# Patient Record
Sex: MALE | Race: WHITE | NOT HISPANIC OR LATINO | Employment: OTHER | URBAN - METROPOLITAN AREA
[De-identification: names, ages, dates, MRNs, and addresses within clinical notes are randomized per-mention and may not be internally consistent; named-entity substitution may affect disease eponyms.]

---

## 2017-02-27 ENCOUNTER — GENERIC CONVERSION - ENCOUNTER (OUTPATIENT)
Dept: OTHER | Facility: OTHER | Age: 67
End: 2017-02-27

## 2017-03-15 ENCOUNTER — ALLSCRIPTS OFFICE VISIT (OUTPATIENT)
Dept: OTHER | Facility: OTHER | Age: 67
End: 2017-03-15

## 2017-03-17 ENCOUNTER — ALLSCRIPTS OFFICE VISIT (OUTPATIENT)
Dept: OTHER | Facility: OTHER | Age: 67
End: 2017-03-17

## 2017-03-22 ENCOUNTER — GENERIC CONVERSION - ENCOUNTER (OUTPATIENT)
Dept: OTHER | Facility: OTHER | Age: 67
End: 2017-03-22

## 2017-04-10 ENCOUNTER — GENERIC CONVERSION - ENCOUNTER (OUTPATIENT)
Dept: OTHER | Facility: OTHER | Age: 67
End: 2017-04-10

## 2017-04-26 ENCOUNTER — GENERIC CONVERSION - ENCOUNTER (OUTPATIENT)
Dept: OTHER | Facility: OTHER | Age: 67
End: 2017-04-26

## 2017-05-12 ENCOUNTER — GENERIC CONVERSION - ENCOUNTER (OUTPATIENT)
Dept: OTHER | Facility: OTHER | Age: 67
End: 2017-05-12

## 2017-07-25 ENCOUNTER — GENERIC CONVERSION - ENCOUNTER (OUTPATIENT)
Dept: OTHER | Facility: OTHER | Age: 67
End: 2017-07-25

## 2017-09-04 ENCOUNTER — GENERIC CONVERSION - ENCOUNTER (OUTPATIENT)
Dept: OTHER | Facility: OTHER | Age: 67
End: 2017-09-04

## 2017-09-21 ENCOUNTER — ALLSCRIPTS OFFICE VISIT (OUTPATIENT)
Dept: OTHER | Facility: OTHER | Age: 67
End: 2017-09-21

## 2017-09-21 DIAGNOSIS — I10 ESSENTIAL (PRIMARY) HYPERTENSION: ICD-10-CM

## 2017-10-31 ENCOUNTER — ALLSCRIPTS OFFICE VISIT (OUTPATIENT)
Dept: OTHER | Facility: OTHER | Age: 67
End: 2017-10-31

## 2017-10-31 DIAGNOSIS — M25.551 PAIN IN RIGHT HIP: ICD-10-CM

## 2017-10-31 DIAGNOSIS — M25.552 PAIN IN LEFT HIP: ICD-10-CM

## 2017-10-31 DIAGNOSIS — Z11.59 ENCOUNTER FOR SCREENING FOR OTHER VIRAL DISEASES (CODE): ICD-10-CM

## 2017-11-07 ENCOUNTER — APPOINTMENT (OUTPATIENT)
Dept: LAB | Facility: HOSPITAL | Age: 67
End: 2017-11-07
Attending: FAMILY MEDICINE
Payer: COMMERCIAL

## 2017-11-07 ENCOUNTER — HOSPITAL ENCOUNTER (OUTPATIENT)
Dept: RADIOLOGY | Facility: HOSPITAL | Age: 67
Discharge: HOME | End: 2017-11-07
Attending: FAMILY MEDICINE
Payer: COMMERCIAL

## 2017-11-07 ENCOUNTER — GENERIC CONVERSION - ENCOUNTER (OUTPATIENT)
Dept: OTHER | Facility: OTHER | Age: 67
End: 2017-11-07

## 2017-11-07 ENCOUNTER — TRANSCRIBE ORDERS (OUTPATIENT)
Dept: ADMINISTRATIVE | Facility: HOSPITAL | Age: 67
End: 2017-11-07

## 2017-11-07 DIAGNOSIS — M25.551 PAIN IN RIGHT HIP: ICD-10-CM

## 2017-11-07 DIAGNOSIS — M25.552 PAIN IN LEFT HIP: ICD-10-CM

## 2017-11-07 DIAGNOSIS — Z11.59 ENCOUNTER FOR SCREENING FOR OTHER VIRAL DISEASES (CODE): ICD-10-CM

## 2017-11-07 LAB — HCV AB SER QL: NORMAL

## 2017-11-07 PROCEDURE — 36415 COLL VENOUS BLD VENIPUNCTURE: CPT

## 2017-11-07 PROCEDURE — 86803 HEPATITIS C AB TEST: CPT

## 2018-01-10 NOTE — PROGRESS NOTES
Assessment    1  Encounter for preventive health examination (V70 0) (Z00 00)   2  Chronic bilateral low back pain without sciatica (724 2,338 29) (M54 5,G89 29)   3  Hip pain, bilateral (719 45) (M25 551,M25 552)    Plan  Chronic bilateral low back pain without sciatica    · Meloxicam 15 MG Oral Tablet; TAKE 1 TABLET DAILY AS NEEDED   · Follow-up visit in 6 months Evaluation and Treatment  Follow-up  Status: Hold For -  Scheduling  Requested for: 31Oct2017  Hip pain, bilateral    · * XR HIP/PELV 2-3 VWS LEFT W PELVIS IF PERFORMED; Status:Active; Requested  for:31Oct2017;    · * XR HIP/PELV 2-3 VWS RIGHT W PELVIS IF PERFORMED; Status:Active; Requested  for:31Oct2017;   Hyperlipidemia    · Simvastatin 20 MG Oral Tablet; Take 1 tablet by mouth at bedtime  Need for hepatitis C screening test    · (1) HEP C ANTIBODY; Status:Active; Requested for:31Oct2017;     Discussion/Summary  health maintenance visit Currently, he eats a healthy diet  Prostate cancer screening: prostate cancer screening is current  Colorectal cancer screening: colorectal cancer screening is current  The risks and benefits of immunizations were discussed  Advice and education were given regarding aerobic exercise and cardiovascular risk reduction  Patient discussion: discussed with the patient  The patient was counseled on Hepatitis C screening  The patient agrees to Hepatitis C screening  Chief Complaint  Patient is here for a PE , discuss arthritis, urologist visits and general surgeon follow up      History of Present Illness  HM, Adult Male: The patient is being seen for a health maintenance evaluation  General Health: The patient's health since the last visit is described as good  He has regular dental visits  He denies vision problems  He denies hearing loss  Immunizations status: up to date  Lifestyle:  He consumes a diverse and healthy diet  He does not have any weight concerns  He exercises regularly   He does not use tobacco  He denies alcohol use  He denies drug use  Screening: cancer screening reviewed and current  metabolic screening reviewed and current  risk screening reviewed and current  Review of Systems    Constitutional: No fever or chills, feels well, no tiredness, no recent weight gain or weight loss  Eyes: No complaints of eye pain, no red eyes, no discharge from eyes, no itchy eyes  ENT: no complaints of earache, no hearing loss, no nosebleeds, no nasal discharge, no sore throat, no hoarseness  Cardiovascular: No complaints of slow heart rate, no fast heart rate, no chest pain, no palpitations, no leg claudication, no lower extremity  Respiratory: No complaints of shortness of breath, no wheezing, no cough, no SOB on exertion, no orthopnea or PND  Gastrointestinal: No complaints of abdominal pain, no constipation, no nausea or vomiting, no diarrhea or bloody stools  Genitourinary: No complaints of dysuria, no incontinence, no hesitancy, no nocturia, no genital lesion, no testicular pain  Musculoskeletal: hip pain  Integumentary: No complaints of skin rash or skin lesions, no itching, no skin wound, no dry skin  Neurological: No compliants of headache, no confusion, no convulsions, no numbness or tingling, no dizziness or fainting, no limb weakness, no difficulty walking  Psychiatric: Is not suicidal, no sleep disturbances, no anxiety or depression, no change in personality, no emotional problems  Endocrine: No complaints of proptosis, no hot flashes, no muscle weakness, no erectile dysfunction, no deepening of the voice, no feelings of weakness  Hematologic/Lymphatic: No complaints of swollen glands, no swollen glands in the neck, does not bleed easily, no easy bruising  Active Problems    1  Cardiomyopathy (425 4) (I42 9)   2  Colon cancer screening (V76 51) (Z12 11)   3  History of colon polyps (V12 72) (Z86 010)   4  Hyperlipidemia (272 4) (E78 5)   5  Hypertension (401 9) (I10)   6  Lipoma of torso (214 1) (D17 1)   7  Mitral valve disease (394 9) (I05 9)   8  Neck pain (723 1) (M54 2)    Past Medical History    · History of Benign hypertension (401 1) (I10)   · H/O echocardiogram (V15 89) (Z92 89)   · History of atrial fibrillation (V12 59) (Z86 79)   · History of blood coagulation disorder (V12 3) (Z86 2)   · History of Hypertriglyceridemia (272 1) (E78 1)   · 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)   · History of Neurogenic bladder (596 54) (N31 9)   · History of Other postprocedural cardiac functional disturbance following cardiac surgery  (429 4) (I97 190)    Family History  Mother    · Family history of Diabetes (250 00) (E11 9)   · Family history of colon cancer (V16 0) (Z80 0)   · Denied: Family history of mental disorder   · Denied: Family history of substance abuse  Father    · Denied: Family history of mental disorder   · Denied: Family history of substance abuse    Social History    · Alcohol use (V49 89) (Z78 9)   · Always uses seat belt   · Daily caffeine consumption, 2-3 servings a day   · Does not use illicit drugs (D88 52) (Z78 9)   · Employed   · Former smoker (V15 82) (R73 881)   · Seeing a dentist   · Denied: History of Smoking    Current Meds   1  Aspirin 81 MG Oral Tablet Delayed Release; take 1 tablet every day; Therapy: 98XPD7819 to (Last Rx:15Mar2017)  Requested for: 19STK4195 Ordered   2  Metoprolol Succinate ER 50 MG Oral Tablet Extended Release 24 Hour; TAKE 1/2   TABLETS DAILY; Therapy: 21SLJ4594 to (Evaluate:10Mar2018)  Requested for: 76RKQ2951; Last   Rx:15Mar2017 Ordered   3  Ramipril 2 5 MG Oral Capsule; take 1 capsule daily; Therapy: 39OTW5961 to (NWFEZDOY:76ZZL4092)  Requested for: 68XAH8644; Last   Rx:65Qpq4818 Ordered   4  Tamsulosin HCl - 0 4 MG Oral Capsule; Therapy: 83HRU1728 to (Evaluate:46Igg6819) Recorded    Allergies    1   No Known Drug Allergies    Vitals   Recorded: 68AGW9022 04:48PM   Heart Rate 66   Systolic 644   Diastolic 60 Height 6 ft 3 in   Weight 190 lb    BMI Calculated 23 75   BSA Calculated 2 15   O2 Saturation 98     Physical Exam    Constitutional   General appearance: No acute distress, well appearing and well nourished  Eyes   Conjunctiva and lids: No erythema, swelling or discharge  Pupils and irises: Equal, round, reactive to light  Ophthalmoscopic examination: Normal fundi and optic discs  Ears, Nose, Mouth, and Throat   External inspection of ears and nose: Normal     Otoscopic examination: Tympanic membranes translucent with normal light reflex  Canals patent without erythema  Hearing: Normal     Nasal mucosa, septum, and turbinates: Normal without edema or erythema  Lips, teeth, and gums: Normal, good dentition  Oropharynx: Normal with no erythema, edema, exudate or lesions  Neck   Neck: Supple, symmetric, trachea midline, no masses  Thyroid: Normal, no thyromegaly  Pulmonary   Respiratory effort: No increased work of breathing or signs of respiratory distress  Percussion of chest: Normal     Palpation of chest: Normal     Auscultation of lungs: Clear to auscultation  Cardiovascular   Palpation of heart: Normal PMI, no thrills  Auscultation of heart: Normal rate and rhythm, normal S1 and S2, no murmurs  Carotid pulses: 2+ bilaterally  Abdominal aorta: Normal     Femoral pulses: 2+ bilaterally  Pedal pulses: 2+ bilaterally  Examination of extremities for edema and/or varicosities: Normal     Chest   Breasts: Normal, no dimpling or skin changes appreciated  Palpation of breasts and axillae: Normal, no masses palpated  Chest: Normal     Abdomen   Abdomen: Non-tender, no masses  Liver and spleen: No hepatomegaly or splenomegaly  Lymphatic   Palpation of lymph nodes in neck: No lymphadenopathy  Palpation of lymph nodes in axillae: No lymphadenopathy  Palpation of lymph nodes in groin: No lymphadenopathy      Palpation of lymph nodes in other areas: No lymphadenopathy  Musculoskeletal   Gait and station: Normal     Inspection/palpation of digits and nails: Normal without clubbing or cyanosis  Inspection/palpation of joints, bones, and muscles: Normal     Range of motion: Normal     Stability: Normal     Muscle strength/tone: Normal     Skin   Skin and subcutaneous tissue: Abnormal   multiple lipomas on arms  Palpation of skin and subcutaneous tissue: Normal turgor  Neurologic   Cranial nerves: Cranial nerves 2-12 intact  Reflexes: 2+ and symmetric  Sensation: No sensory loss  Psychiatric   Judgment and insight: Normal     Orientation to person, place and time: Normal     Recent and remote memory: Intact  Mood and affect: Normal        Health Management  Colon cancer screening   COLONOSCOPY; every 5 years; Last 94Pbs7417; Next Due: 64Ace6945;  Active    Signatures   Electronically signed by : Kenney Boxer, DO; Oct 31 2017  5:14PM EST                       (Author)

## 2018-01-13 VITALS
HEIGHT: 75 IN | BODY MASS INDEX: 23.62 KG/M2 | OXYGEN SATURATION: 96 % | SYSTOLIC BLOOD PRESSURE: 108 MMHG | DIASTOLIC BLOOD PRESSURE: 78 MMHG | HEART RATE: 66 BPM | WEIGHT: 190 LBS

## 2018-01-14 VITALS
BODY MASS INDEX: 23.42 KG/M2 | DIASTOLIC BLOOD PRESSURE: 62 MMHG | SYSTOLIC BLOOD PRESSURE: 108 MMHG | WEIGHT: 188.38 LBS | HEIGHT: 75 IN

## 2018-01-14 VITALS
HEIGHT: 75 IN | WEIGHT: 190 LBS | BODY MASS INDEX: 23.62 KG/M2 | HEART RATE: 66 BPM | OXYGEN SATURATION: 98 % | SYSTOLIC BLOOD PRESSURE: 114 MMHG | DIASTOLIC BLOOD PRESSURE: 60 MMHG

## 2018-01-14 VITALS
DIASTOLIC BLOOD PRESSURE: 68 MMHG | WEIGHT: 190 LBS | HEIGHT: 75 IN | SYSTOLIC BLOOD PRESSURE: 112 MMHG | OXYGEN SATURATION: 97 % | HEART RATE: 75 BPM | BODY MASS INDEX: 23.62 KG/M2

## 2018-01-14 NOTE — RESULT NOTES
Verified Results  * XR HIPS BILATERAL 2 VW W PELVIS IF PERFORMED [id number] 08:03AM Siri Kraus Order Number: [id number]     Test Name Result Flag Reference   XR HIPS BILATERAL WITH AP PELVIS (Report)     BILATERAL HIPS AND PELVIS     INDICATION: M25 551: Pain in right hip   M25 552: Pain in left hip  History taken directly from the electronic ordering system  COMPARISON: None     VIEWS: AP pelvis and coned down views of each hip     IMAGES: 5      FINDINGS:     No acute pelvic fracture or pathologic bone lesions  Visualized bony pelvis appears intact  There are moderate degenerative changes of the lower lumbar spine  LEFT HIP:   There are mild arthritic changes including joint space narrowing and small acetabular marginal osteophytes  There are tiny osteophytes at the femoral head  Bony alignment is maintained  Soft tissues are unremarkable  RIGHT HIP:   There are mild arthritic changes, including joint space narrowing as well as small acetabular marginal osteophytes  There is a small osteophyte at the superior margin of the femoral head  Bony alignment is maintained  Soft tissues are unremarkable  IMPRESSION:     Mild osteoarthritic degenerative changes of both hip joints         Workstation performed: [id number]     Signed by:   Quicny Rodriguez MD   11/7/17

## 2018-01-15 NOTE — MISCELLANEOUS
This is to state that this patient has no specific contraindication from cardiac standpoint to have back injection  Patient can stop aspirin 5 days prior to the procedure  If you have any specific questions, please  do not hesitate to contact me  Electronically signed by:Larissa GARRIDO    Sep 30 2016 11:29AM EST

## 2018-01-15 NOTE — MISCELLANEOUS
This is to state that this patient has no specific contraindication from cardiac standpoint to have lipoma removal  If you have any specific questions, please do not hesitate to contact me in person  Patient can  stop aspirin for 5 days prior to surgery and restart when okay from surgical standpoint  Electronically signed by:Larissa GARRIDO    Apr 26 2017 10:50PM EST

## 2018-01-15 NOTE — PROGRESS NOTES
Assessment    1  Encounter for preventive health examination (V70 0) (Z00 00)   2  Cardiomyopathy (425 4) (I42 9)   3  Hyperlipidemia (272 4) (E78 5)   4  Hypertension (401 9) (I10)    Plan  Colon cancer screening    · COLONOSCOPY; Status:Active; Requested KOP:[id number]; Health Maintenance    · Follow-up visit in 1 year Evaluation and Treatment  Follow-up  Status: Hold For -  Scheduling  Requested for: 27Oct2016  Hyperlipidemia, Hypertension    · (1) CBC/ PLT (NO DIFF); Status:Active; Requested ROGER:[id number];    · (1) COMPREHENSIVE METABOLIC PANEL; Status:Active; Requested SUP:[id number];     Discussion/Summary  Impression: health maintenance visit  Currently, he eats a healthy diet  Prostate cancer screening: prostate cancer screening is current  Testicular cancer screening: the risks and benefits of testicular cancer screening were discussed  Colorectal cancer screening: colonoscopy has been ordered  Advice and education were given regarding nutrition and aerobic exercise  Follow-up with spine surgeon as scheduled  call if neurofibromas start to bother him and will refer to surgeon for removal      Self Referrals: Yes [place] back specialist      Chief Complaint  Wellness visit  Patient states he is finishing a steroid pack prescribed for his back  History of Present Illness  HM, Adult Male: The patient is being seen for a health maintenance evaluation  General Health: The patient's health since the last visit is described as good  He has regular dental visits  He denies vision problems  He denies hearing loss  Immunizations status: up to date  Lifestyle:  He consumes a diverse and healthy diet  He does not have any weight concerns  He exercises regularly  He does not use tobacco  He denies alcohol use  He denies drug use  Screening: cancer screening reviewed and current  metabolic screening reviewed and current  risk screening reviewed and current        Review of Systems    Constitutional: No fever or chills, feels well, no tiredness, no recent weight gain or weight loss  Eyes: No complaints of eye pain, no red eyes, no discharge from eyes, no itchy eyes  ENT: no complaints of earache, no hearing loss, no nosebleeds, no nasal discharge, no sore throat, no hoarseness  Cardiovascular: No complaints of slow heart rate, no fast heart rate, no chest pain, no palpitations, no leg claudication, no lower extremity  Respiratory: No complaints of shortness of breath, no wheezing, no cough, no SOB on exertion, no orthopnea or PND  Gastrointestinal: No complaints of abdominal pain, no constipation, no nausea or vomiting, no diarrhea or bloody stools  Genitourinary: No complaints of dysuria, no incontinence, no hesitancy, no nocturia, no genital lesion, no testicular pain  Musculoskeletal: back pain  Integumentary: No complaints of skin rash or skin lesions, no itching, no skin wound, no dry skin  Neurological: No compliants of headache, no confusion, no convulsions, no numbness or tingling, no dizziness or fainting, no limb weakness, no difficulty walking  Psychiatric: Is not suicidal, no sleep disturbances, no anxiety or depression, no change in personality, no emotional problems  Endocrine: No complaints of proptosis, no hot flashes, no muscle weakness, no erectile dysfunction, no deepening of the voice, no feelings of weakness  Hematologic/Lymphatic: No complaints of swollen glands, no swollen glands in the neck, does not bleed easily, no easy bruising  Active Problems    1  Cardiomyopathy (425 4) (I42 9)   2  Hyperlipidemia (272 4) (E78 5)   3  Hypertension (401 9) (I10)   4  Mitral valve disorder (424 0) (I05 9)   5   Neck pain (723 1) (M54 2)    Past Medical History    · History of Benign hypertension (401 1) (I10)   · H/O echocardiogram (V15 89) (Z92 89)   · History of atrial fibrillation (V12 59) (Z86 79)   · History of blood coagulation disorder (V12 3) (Z86 2)   · History of Hypertriglyceridemia (272 1) (E78 1)   · 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)   · History of Neurogenic bladder (596 54) (N31 9)   · History of Other postprocedural cardiac functional disturbance following cardiac surgery  (429 4) (I97 190)    Family History  Mother    · Family history of Diabetes (250 00) (E11 9)    Social History    · Alcohol use (V49 89) (Z78 9)   · Always uses seat belt   · Daily caffeine consumption, 2-3 servings a day   · Does not use illicit drugs (I75 26) (Z78 9)   · Employed   · Former smoker (V15 82) (D19 271)   · Seeing a dentist   · Denied: History of Smoking    Current Meds   1  Aspirin 325 MG Oral Tablet; Therapy: (Recorded:29Jan2014) to Recorded   2  Metoprolol Succinate ER 50 MG Oral Tablet Extended Release 24 Hour; take 1 and 1/2   tablets by mouth once daily; Therapy: 08BOD5198 to (Evaluate:73Lkr5180)  Requested for: 27Wmm2987; Last   Rx:64Djd9254 Ordered   3  Ramipril 2 5 MG Oral Capsule; take 1 capsule by mouth once daily; Therapy: 64SEO4015 to (Evaluate:25Jan2017)  Requested for: 29XXK6840; Last   Rx:31Jan2016 Ordered   4  Simvastatin 20 MG Oral Tablet; Take 1 tablet by mouth at bedtime; Therapy: 68WID6554 to (Yuan Nelson)  Requested for: 08XOX9519; Last   Rx:13Mar2016 Ordered   5  Tamsulosin HCl - 0 4 MG Oral Capsule; Therapy: 63GYJ6146 to (Evaluate:53Nzw9119) Recorded    Allergies    1  No Known Drug Allergies    Vitals   Recorded: 67NVX8221 21:45XX   Systolic 384   Diastolic 84   Heart Rate 64   O2 Saturation 98   Height 6 ft 3 in   Weight 185 lb    BMI Calculated 23 12   BSA Calculated 2 13     Physical Exam    Constitutional   General appearance: No acute distress, well appearing and well nourished  Eyes   Conjunctiva and lids: No erythema, swelling or discharge  Pupils and irises: Equal, round, reactive to light  Ophthalmoscopic examination: Normal fundi and optic discs      Ears, Nose, Mouth, and Throat   External inspection of ears and nose: Normal     Otoscopic examination: Tympanic membranes translucent with normal light reflex  Canals patent without erythema  Hearing: Normal     Nasal mucosa, septum, and turbinates: Normal without edema or erythema  Lips, teeth, and gums: Normal, good dentition  Oropharynx: Normal with no erythema, edema, exudate or lesions  Neck   Neck: Supple, symmetric, trachea midline, no masses  Thyroid: Normal, no thyromegaly  Pulmonary   Respiratory effort: No increased work of breathing or signs of respiratory distress  Percussion of chest: Normal     Palpation of chest: Normal     Auscultation of lungs: Clear to auscultation  Cardiovascular   Palpation of heart: Normal PMI, no thrills  Auscultation of heart: Normal rate and rhythm, normal S1 and S2, no murmurs  Carotid pulses: 2+ bilaterally  Abdominal aorta: Normal     Femoral pulses: 2+ bilaterally  Pedal pulses: 2+ bilaterally  Examination of extremities for edema and/or varicosities: Normal     Abdomen   Abdomen: Non-tender, no masses  Liver and spleen: No hepatomegaly or splenomegaly  Lymphatic   Palpation of lymph nodes in neck: No lymphadenopathy  Palpation of lymph nodes in axillae: No lymphadenopathy  Musculoskeletal   Gait and station: Normal     Inspection/palpation of digits and nails: Normal without clubbing or cyanosis  Inspection/palpation of joints, bones, and muscles: Normal     Range of motion: Normal     Stability: Normal     Muscle strength/tone: Normal     Skin   Skin and subcutaneous tissue: Normal without rashes or lesions  Palpation of skin and subcutaneous tissue: Normal turgor  Neurologic   Cranial nerves: Cranial nerves 2-12 intact  Reflexes: 2+ and symmetric  Sensation: No sensory loss  Psychiatric   Judgment and insight: Normal     Orientation to person, place and time: Normal     Recent and remote memory: Intact      Mood and affect: Normal        Future Appointments    Date/Time Provider Specialty Site   03/15/2017 09:45 AM JENNIFER Luna   Cardiology   [address]     Signatures   Electronically signed by : Carlita Golden DO; Oct 27 2016  2:15PM EST                       (Author)

## 2018-01-15 NOTE — PROGRESS NOTES
Assessment  Assessed    1  Hypertension (401 9) (I10)   2  Hyperlipidemia (272 4) (E78 5)   3  Mitral valve disorder (424 0) (I05 9)    Discussion/Summary  Cardiology Discussion Summary Free Text Note Form [facility]:   Patient overall doing well from cardiac standpoint  Continue present medications  Prescriptions reviewed and refilled  Results of blood work  Reviewed  Previous cardiac workup  Also discussed with patient  Followup in 6 months or earlier as needed  Patient will followup with primary care physician  Counseling Documentation With Imm: The patient was counseled regarding impressions  Chief Complaint  Chief Complaint Chronic Condition [facility]: Patient is here today for follow up of chronic conditions described in HPI  History of Present Illness  Cardiology HPI Free Text Note Form [facility]: Patient denies any chest pain shortness of breath  No history of leg edema orthopnea PND  No history presyncope syncope  He states he has been compliant with all his present medications  He recently had blood work  Review of Systems  Cardiology Male ROS:     Cardiac: as noted in HPI  Skin: No complaints of nonhealing sores or skin rash  Genitourinary: No complaints of recurrent urinary tract infections, frequent urination at night, difficult urination, blood in urine, kidney stones, loss of bladder control, no kidney or prostate problems, no erectile dysfunction  Psychological: No complaints of feeling depressed, anxiety, panic attacks, or difficulty concentrating  General: No complaints of trouble sleeping, lack of energy, fatigue, appetite changes, weight changes, fever, frequent infections, or night sweats  Respiratory: No complaints of shortness of breath, cough with sputum, or wheezing  HEENT: No complaints of serious problems, hearing problems, nose problems, throat problems, or snoring     Gastrointestinal: No complaints of liver problems, nausea, vomiting, heartburn, constipation, bloody stools, diarrhea, problems swallowing, adbominal pain, or rectal bleeding  Hematologic: No complaints of bleeding disorders, anemia, blood clots, or excessive brusing  Neurological: No complaints of numbness, tingling, dizziness, weakness, seizures, headaches, syncope or fainting, AM fatigue, daytime sleepiness, no witnessed apnea episodes  Musculoskeletal: No complaints of arthritis, back pain, or painfull swelling  ROS Reviewed:   ROS reviewed  Active Problems  Problems    1  Cardiomyopathy (425 4) (I42 9)   2  Hyperlipidemia (272 4) (E78 5)   3  Hypertension (401 9) (I10)   4  Mitral valve disorder (424 0) (I05 9)   5  Neck pain (723 1) (M54 2)    Past Medical History  Problems    1  History of Benign hypertension (401 1) (I10)   2  H/O echocardiogram (V15 89) (Z92 89)   3  History of atrial fibrillation (V12 59) (Z86 79)   4  History of blood coagulation disorder (V12 3) (Z86 2)   5  History of Hypertriglyceridemia (272 1) (E78 1)   6  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)   7  History of Neurogenic bladder (596 54) (N31 9)   8  History of Other postprocedural cardiac functional disturbance following cardiac surgery   (429 4) (I97 190)  Active Problems And Past Medical History Reviewed: The active problems and past medical history were reviewed and updated today  Family History  Mother    1  Family history of Diabetes (250 00) (E11 9)   2  No pertinent family history  Father    3  No pertinent family history  Family History Reviewed: The family history was reviewed and updated today  Social History  Problems    · Former smoker (J61 26) (Y40 319)   · Denied: History of Smoking  Social History Reviewed: The social history was reviewed and updated today  Current Meds   1  Aspirin 325 MG Oral Tablet; Therapy: (Recorded:91Pdu9064) to Recorded   2   Metoprolol Succinate ER 50 MG Oral Tablet Extended Release 24 Hour; take 1 and 1/2   tablets by mouth once daily; Therapy: 70FAP0497 to (Evaluate:72Vup5099)  Requested for: 18Bdl5497; Last   Rx:33Koz7527 Ordered   3  Ramipril 2 5 MG Oral Capsule; TAKE 1 CAPSULE DAILY; Therapy: 03GVW1404 to (Evaluate:22Jan2016)  Requested for: 42MQS2241; Last   Rx:27Jan2015 Ordered   4  Simvastatin 20 MG Oral Tablet; TAKE 1 TABLET DAILY AT BEDTIME  Requested for:   14LZN1443; Last Rx:10Mar2015 Ordered   5  Tamsulosin HCl - 0 4 MG Oral Capsule; Therapy: 47HLJ6083 to (Evaluate:98Bpb2878) Recorded  Medication List Reviewed: The medication list was reviewed and updated today  Allergies  Medication    1  No Known Drug Allergies    Vitals  Vital Signs [Data Includes: Current Encounter]    Recorded: 25TUL7643 10:56AM   Heart Rate 62   Systolic 429   Diastolic 70   Height 6 ft 3 in   Weight 195 lb 8 0 oz   BMI Calculated 24 44   BSA Calculated 2 18     Physical Exam    Constitutional   General appearance: No acute distress, well appearing and well nourished  No acute distress  Eyes   Conjunctiva and Sclera examination: Conjunctiva pink, sclera anicteric  Ears, Nose, Mouth, and Throat - Oropharynx: Clear, nares are clear, mucous membranes are moist    Neck   Neck and thyroid: Normal, supple, trachea midline, no thyromegaly  Pulmonary   Respiratory effort: No increased work of breathing or signs of respiratory distress  Auscultation of lungs: Clear to auscultation, no rales, no rhonchi, no wheezing, good air movement  Cardiovascular   Palpation of heart: Normal PMI, no thrills  Auscultation of heart: Normal rate and rhythm, normal S1 and S2, no murmurs  Carotid pulses: Normal, 2+ bilaterally  Peripheral vascular exam: Normal pulses throughout, no tenderness, erythema or swelling  Pedal pulses: Normal, 2+ bilaterally  Examination of extremities for edema and/or varicosities: Normal     Chest - Chest: Normal    Musculoskeletal Gait and station: Normal gait  Digits and nails: Normal without clubbing or cyanosis  Inspection/palpation of joints, bones, and muscles: Normal, ROM normal     Skin - Skin and subcutaneous tissue: Normal without rashes or lesions  Skin is warm and well perfused, normal turgor  Multiple lipomas noted  Neurologic - Speech: Normal     Psychiatric - Orientation to person, place, and time: Normal  Mood and affect: Normal       Future Appointments    Date/Time Provider Specialty Site   08/02/2016 04:00 PM JENNIFER Alves   Cardiology [facility]     Signatures   Electronically signed by : JENNIFER Jaramillo ; Jan 18 2016  7:32PM EST                       (Author)

## 2018-02-05 DIAGNOSIS — I10 HYPERTENSION, ESSENTIAL: Primary | ICD-10-CM

## 2018-02-05 RX ORDER — RAMIPRIL 2.5 MG/1
2.5 CAPSULE ORAL DAILY
Qty: 90 CAPSULE | Refills: 3 | Status: SHIPPED | OUTPATIENT
Start: 2018-02-05 | End: 2019-01-22 | Stop reason: ALTCHOICE

## 2018-02-23 RX ORDER — SIMVASTATIN 20 MG
1 TABLET ORAL
COMMUNITY
Start: 2016-03-13 | End: 2018-05-05 | Stop reason: SDUPTHER

## 2018-02-23 RX ORDER — CELECOXIB 200 MG/1
CAPSULE ORAL
Refills: 0 | COMMUNITY
Start: 2018-02-15 | End: 2018-02-24

## 2018-02-23 RX ORDER — METOPROLOL SUCCINATE 50 MG/1
50 TABLET, EXTENDED RELEASE ORAL DAILY
COMMUNITY
Start: 2015-07-21 | End: 2018-04-21 | Stop reason: SDUPTHER

## 2018-02-23 RX ORDER — MELOXICAM 15 MG/1
1 TABLET ORAL DAILY PRN
COMMUNITY
Start: 2017-10-31 | End: 2018-04-24

## 2018-02-23 RX ORDER — LIDOCAINE 50 MG/G
PATCH TOPICAL
Refills: 0 | COMMUNITY
Start: 2017-11-18 | End: 2021-10-15

## 2018-02-23 RX ORDER — PHENAZOPYRIDINE HYDROCHLORIDE 200 MG/1
200 TABLET, FILM COATED ORAL 3 TIMES DAILY PRN
Refills: 0 | Status: ON HOLD | COMMUNITY
Start: 2017-12-18 | End: 2018-11-29

## 2018-02-23 RX ORDER — TAMSULOSIN HYDROCHLORIDE 0.4 MG/1
0.4 CAPSULE ORAL
COMMUNITY
Start: 2014-10-12

## 2018-02-24 ENCOUNTER — OFFICE VISIT (OUTPATIENT)
Dept: FAMILY MEDICINE CLINIC | Facility: CLINIC | Age: 68
End: 2018-02-24
Payer: COMMERCIAL

## 2018-02-24 VITALS
TEMPERATURE: 98 F | BODY MASS INDEX: 23.13 KG/M2 | SYSTOLIC BLOOD PRESSURE: 120 MMHG | HEIGHT: 75 IN | HEART RATE: 103 BPM | WEIGHT: 186 LBS | DIASTOLIC BLOOD PRESSURE: 82 MMHG

## 2018-02-24 DIAGNOSIS — J40 BRONCHITIS: Primary | ICD-10-CM

## 2018-02-24 PROCEDURE — 1101F PT FALLS ASSESS-DOCD LE1/YR: CPT | Performed by: FAMILY MEDICINE

## 2018-02-24 PROCEDURE — 99213 OFFICE O/P EST LOW 20 MIN: CPT | Performed by: FAMILY MEDICINE

## 2018-02-24 RX ORDER — PROMETHAZINE HYDROCHLORIDE AND CODEINE PHOSPHATE 6.25; 1 MG/5ML; MG/5ML
5 SYRUP ORAL EVERY 4 HOURS PRN
Qty: 120 ML | Refills: 0 | Status: SHIPPED | OUTPATIENT
Start: 2018-02-24 | End: 2018-04-24 | Stop reason: ALTCHOICE

## 2018-02-24 RX ORDER — CIPROFLOXACIN 500 MG/1
500 TABLET, FILM COATED ORAL EVERY 12 HOURS SCHEDULED
Qty: 20 TABLET | Refills: 0 | Status: SHIPPED | OUTPATIENT
Start: 2018-02-24 | End: 2018-03-06

## 2018-02-24 NOTE — PATIENT INSTRUCTIONS

## 2018-02-24 NOTE — PROGRESS NOTES
Assessment/Plan:       Diagnoses and all orders for this visit:    Bronchitis  -     ciprofloxacin (CIPRO) 500 mg tablet; Take 1 tablet (500 mg total) by mouth every 12 (twelve) hours for 10 days  -     promethazine-codeine (PHENERGAN WITH CODEINE) 6 25-10 mg/5 mL syrup; Take 5 mL by mouth every 4 (four) hours as needed for cough              Subjective:      Patient ID: Josh Funes is a 74 y o  male  Cough   This is a new problem  The current episode started 1 to 4 weeks ago  The problem has been gradually worsening  The problem occurs constantly  The cough is non-productive  Associated symptoms include ear pain, rhinorrhea, a sore throat and shortness of breath  Pertinent negatives include no chest pain, chills or fever  He has tried OTC cough suppressant for the symptoms  The treatment provided no relief  The following portions of the patient's history were reviewed and updated as appropriate:   He has a past medical history of Atrial fibrillation ([facility] [address] ); Benign hypertension; Blood coagulation disorder ([facility] [address] ); Hypertriglyceridemia; Lipid metabolism disorder; Neurogenic bladder; and Other postprocedural cardiac functional disturbances following cardiac surgery  ,   does not have any pertinent problems on file  ,   has a past surgical history that includes Knee surgery (Bilateral) and Lipoma resection  ,  family history includes Colon cancer in his mother; Diabetes in his mother  ,   reports that he has quit smoking  He has never used smokeless tobacco  He reports that he drinks alcohol  He reports that he does not use drugs  ,  has No Known Allergies     Current Outpatient Prescriptions   Medication Sig Dispense Refill    aspirin 81 MG tablet Take 1 tablet by mouth daily      lidocaine (LIDODERM) 5 %   0    meloxicam (MOBIC) 15 mg tablet Take 1 tablet by mouth daily as needed      metoprolol succinate (TOPROL-XL) 50 mg 24 hr tablet Take by mouth      ramipril (ALTACE) 2 5 mg capsule Take 1 capsule (2 5 mg total) by mouth daily 90 capsule 3    simvastatin (ZOCOR) 20 mg tablet Take 1 tablet by mouth      tamsulosin (FLOMAX) 0 4 mg Take by mouth      ciprofloxacin (CIPRO) 500 mg tablet Take 1 tablet (500 mg total) by mouth every 12 (twelve) hours for 10 days 20 tablet 0    phenazopyridine (PYRIDIUM) 200 mg tablet Take 200 mg by mouth 3 (three) times a day as needed  0    promethazine-codeine (PHENERGAN WITH CODEINE) 6 25-10 mg/5 mL syrup Take 5 mL by mouth every 4 (four) hours as needed for cough 120 mL 0     No current facility-administered medications for this visit  Review of Systems   Constitutional: Negative for chills and fever  HENT: Positive for ear pain, rhinorrhea and sore throat  Negative for sinus pain  Eyes: Negative for pain  Respiratory: Positive for cough and shortness of breath  Cardiovascular: Negative for chest pain  Gastrointestinal: Negative for abdominal pain  Genitourinary: Negative for difficulty urinating  Objective:  Vitals:    02/24/18 0855   BP: 120/82   Pulse: 103   Temp: 98 °F (36 7 °C)   Weight: 84 4 kg (186 lb)   Height: 6' 3" (1 905 m)     Body mass index is 23 25 kg/m²  Physical Exam   Constitutional: He appears well-developed  HENT:   Head: Normocephalic  Nose: Mucosal edema, rhinorrhea and sinus tenderness present  Right sinus exhibits maxillary sinus tenderness and frontal sinus tenderness  Left sinus exhibits maxillary sinus tenderness and frontal sinus tenderness  Eyes: Conjunctivae are normal    Neck: Neck supple  Cardiovascular: Normal rate and regular rhythm  Pulmonary/Chest: No respiratory distress  He has wheezes  Abdominal: There is no tenderness  Lymphadenopathy:     He has no cervical adenopathy  Nursing note and vitals reviewed

## 2018-03-23 ENCOUNTER — OFFICE VISIT (OUTPATIENT)
Dept: CARDIOLOGY CLINIC | Facility: CLINIC | Age: 68
End: 2018-03-23
Payer: COMMERCIAL

## 2018-03-23 VITALS
SYSTOLIC BLOOD PRESSURE: 122 MMHG | BODY MASS INDEX: 23.87 KG/M2 | OXYGEN SATURATION: 97 % | HEIGHT: 75 IN | HEART RATE: 72 BPM | DIASTOLIC BLOOD PRESSURE: 72 MMHG | WEIGHT: 192 LBS

## 2018-03-23 DIAGNOSIS — E78.2 MIXED HYPERLIPIDEMIA: ICD-10-CM

## 2018-03-23 DIAGNOSIS — I10 ESSENTIAL HYPERTENSION: Primary | ICD-10-CM

## 2018-03-23 PROCEDURE — 99213 OFFICE O/P EST LOW 20 MIN: CPT | Performed by: INTERNAL MEDICINE

## 2018-03-23 NOTE — PROGRESS NOTES
[facility] [place]  [address]  [facility]  [address]  Cardiology Follow Up    Jairo Sarabia  1950  [id number]      1  Essential hypertension     2  Mixed hyperlipidemia         Chief Complaint   Patient presents with    Follow-up       Interval History:   Patient presents for follow-up visit  Patient denies any history of chest pain shortness of breath  Patient denies any history of leg edema or orthopnea PND  No history of presyncope syncope  Patient states compliance with the present list of medications      Patient Active Problem List   Diagnosis    Cardiomyopathy ([facility] )    Hyperlipidemia    Hypertension     Past Medical History:   Diagnosis Date    Atrial fibrillation ([facility] )     Benign hypertension     Blood coagulation disorder ([facility] )     Hypertriglyceridemia     Lipid metabolism disorder     Neurogenic bladder     Other postprocedural cardiac functional disturbances following cardiac surgery      Social History     Social History    Marital status: /Civil Union     Spouse name: N/A    Number of children: N/A    Years of education: N/A     Occupational History          employed     Social History Main Topics    Smoking status: Former Smoker    Smokeless tobacco: Never Used    Alcohol use Yes    Drug use: No    Sexual activity: Not on file     Other Topics Concern    Not on file     Social History Narrative    Always uses a seatbelt    Daily caffeine consumption, 2-3 servings per day    Seeing a dentist          Family History   Problem Relation Age of Onset    Diabetes Mother     Colon cancer Mother      Past Surgical History:   Procedure Laterality Date    KNEE SURGERY Bilateral     LIPOMA RESECTION         Current Outpatient Prescriptions:     aspirin 81 MG tablet, Take 1 tablet by mouth daily, Disp: , Rfl:     lidocaine (LIDODERM) 5 %, , Disp: , Rfl: 0    meloxicam (MOBIC) 15 mg tablet, Take 1 tablet by mouth daily as needed, Disp: , Rfl:   metoprolol succinate (TOPROL-XL) 50 mg 24 hr tablet, Take 50 mg by mouth daily  , Disp: , Rfl:     ramipril (ALTACE) 2 5 mg capsule, Take 1 capsule (2 5 mg total) by mouth daily, Disp: 90 capsule, Rfl: 3    simvastatin (ZOCOR) 20 mg tablet, Take 1 tablet by mouth, Disp: , Rfl:     tamsulosin (FLOMAX) 0 4 mg, Take by mouth, Disp: , Rfl:     phenazopyridine (PYRIDIUM) 200 mg tablet, Take 200 mg by mouth 3 (three) times a day as needed, Disp: , Rfl: 0    promethazine-codeine (PHENERGAN WITH CODEINE) 6 25-10 mg/5 mL syrup, Take 5 mL by mouth every 4 (four) hours as needed for cough, Disp: 120 mL, Rfl: 0  No Known Allergies    Labs:  No visits with results within 2 Month(s) from this visit  Latest known visit with results is:   Appointment on 11/07/2017   Component Date Value    Hepatitis C Ab 11/07/2017 Non-reactive      Imaging: No results found  Review of Systems:  Review of Systems   REVIEW OF SYSTEMS:  Constitutional:  Denies fever or chills   Eyes:  Denies change in visual acuity   HENT:  Denies nasal congestion or sore throat   Respiratory:  Denies cough or shortness of breath   Cardiovascular:  Denies chest pain or edema   GI:  Denies abdominal pain, nausea, vomiting, bloody stools or diarrhea   :  Denies dysuria, frequency, difficulty in micturition and nocturia  Musculoskeletal:  Denies back pain or joint pain   Neurologic:  Denies headache, focal weakness or sensory changes   Endocrine:  Denies polyuria or polydipsia   Lymphatic:  Denies swollen glands   Psychiatric:  Denies depression or anxiety     Physical Exam:    /72   Pulse 72   Ht 6' 3" (1 905 m)   Wt 87 1 kg (192 lb)   SpO2 97%   BMI 24 00 kg/m²     Physical Exam   PHYSICAL EXAM:  General:  Patient is not in acute distress   Head: Normocephalic, Atraumatic  HEENT:  Both pupils normal-size atraumatic, normocephalic, nonicteric  Neck:  JVP not raised   Trachea central  No carotid bruit  Respiratory:  normal breath sounds no crackles  no rhonchi  Cardiovascular:  Regular rate and rhythm no S3 no murmurs  GI:  Abdomen soft nontender  No organomegaly  Lymphatic:  No cervical or inguinal lymphadenopathy  Neurologic:  Patient is awake alert, oriented   Grossly nonfocal    Discussion/Summary:  Patient overall doing well from a cardiovascular standpoint  Symptoms to watch out from cardiac standpoint which would indicate the need for further cardiac evaluation also discussed with the patient  Medications reviewed  Recent blood work data were reviewed with the patient  Follow-up in 6 months  Follow-up with primary care physician

## 2018-04-20 ENCOUNTER — TELEPHONE (OUTPATIENT)
Dept: FAMILY MEDICINE CLINIC | Facility: CLINIC | Age: 68
End: 2018-04-20

## 2018-04-21 DIAGNOSIS — I10 HYPERTENSION, ESSENTIAL: Primary | ICD-10-CM

## 2018-04-21 RX ORDER — METOPROLOL SUCCINATE 50 MG/1
TABLET, EXTENDED RELEASE ORAL
Qty: 135 TABLET | Refills: 3 | Status: SHIPPED | OUTPATIENT
Start: 2018-04-21 | End: 2018-12-12 | Stop reason: SDUPTHER

## 2018-04-24 ENCOUNTER — OFFICE VISIT (OUTPATIENT)
Dept: FAMILY MEDICINE CLINIC | Facility: CLINIC | Age: 68
End: 2018-04-24
Payer: COMMERCIAL

## 2018-04-24 VITALS
BODY MASS INDEX: 23.62 KG/M2 | HEIGHT: 75 IN | TEMPERATURE: 96.8 F | RESPIRATION RATE: 18 BRPM | SYSTOLIC BLOOD PRESSURE: 124 MMHG | WEIGHT: 190 LBS | HEART RATE: 69 BPM | DIASTOLIC BLOOD PRESSURE: 80 MMHG | OXYGEN SATURATION: 97 %

## 2018-04-24 DIAGNOSIS — M54.50 CHRONIC BILATERAL LOW BACK PAIN WITHOUT SCIATICA: Primary | ICD-10-CM

## 2018-04-24 DIAGNOSIS — E78.2 MIXED HYPERLIPIDEMIA: ICD-10-CM

## 2018-04-24 DIAGNOSIS — G89.29 CHRONIC BILATERAL LOW BACK PAIN WITHOUT SCIATICA: Primary | ICD-10-CM

## 2018-04-24 DIAGNOSIS — I10 ESSENTIAL HYPERTENSION: ICD-10-CM

## 2018-04-24 PROCEDURE — 99214 OFFICE O/P EST MOD 30 MIN: CPT | Performed by: FAMILY MEDICINE

## 2018-04-24 NOTE — PROGRESS NOTES
Assessment/Plan:       Diagnoses and all orders for this visit:    Chronic bilateral low back pain without sciatica  -     diclofenac sodium (VOLTAREN) 1 %; Apply 2 g topically 4 (four) times a day To affected area    Mixed hyperlipidemia    Essential hypertension    Other orders  -     oxaprozin (DAYPRO) 600 MG tablet; He is to stop taking the meloxicam and only use Daypro  Continue   Remainder of his medications  Follow up with cardiologist as scheduled, follow up with his pain management as scheduled  Will see him back in 1 year's time sooner if he has any problems  Subjective:      Patient ID: Arthur Mcwilliams is a 74 y o  male  Patient comes in today for checkup  He is now taking Daypro as prescribed by his pain management, he states that he still takes meloxicam periodically  He states that the day prior does help with his back  He states that his right hip has been bothering him more he has been seeing an orthopedist, who recommends he get cortisone injections  He has not pursued this yet  He is up-to-date with his cardiologist for his blood pressure and his cardiomyopathy, he is taking his medications without any problems, no side effects  The following portions of the patient's history were reviewed and updated as appropriate:   He has a past medical history of Atrial fibrillation ([address] ); Benign hypertension; Blood coagulation disorder ([address] ); Hypertriglyceridemia; Lipid metabolism disorder; Neurogenic bladder; and Other postprocedural cardiac functional disturbances following cardiac surgery  ,   does not have any pertinent problems on file  ,   has a past surgical history that includes Knee surgery (Bilateral) and Lipoma resection  ,  family history includes Colon cancer in his mother; Diabetes in his mother  ,   reports that he has quit smoking  He has never used smokeless tobacco  He reports that he drinks alcohol  He reports that he does not use drugs  ,  has No Known Allergies     Current Outpatient Prescriptions   Medication Sig Dispense Refill    aspirin 81 MG tablet Take 1 tablet by mouth daily      lidocaine (LIDODERM) 5 %   0    metoprolol succinate (TOPROL-XL) 50 mg 24 hr tablet TAKE 1 AND 1/2 TABLETS DAILY  135 tablet 3    oxaprozin (DAYPRO) 600 MG tablet       phenazopyridine (PYRIDIUM) 200 mg tablet Take 200 mg by mouth 3 (three) times a day as needed  0    ramipril (ALTACE) 2 5 mg capsule Take 1 capsule (2 5 mg total) by mouth daily 90 capsule 3    simvastatin (ZOCOR) 20 mg tablet Take 1 tablet by mouth      tamsulosin (FLOMAX) 0 4 mg Take by mouth      diclofenac sodium (VOLTAREN) 1 % Apply 2 g topically 4 (four) times a day To affected area 100 g 3     No current facility-administered medications for this visit  Review of Systems   Constitutional: Negative for chills, fatigue and fever  HENT: Negative for congestion, ear pain, hearing loss, postnasal drip, rhinorrhea and sore throat  Eyes: Negative for pain and visual disturbance  Respiratory: Negative for chest tightness, shortness of breath and wheezing  Cardiovascular: Negative for chest pain and leg swelling  Gastrointestinal: Negative for abdominal distention, abdominal pain, constipation, diarrhea and vomiting  Endocrine: Negative for cold intolerance and heat intolerance  Genitourinary: Negative for difficulty urinating, frequency and urgency  Musculoskeletal: Positive for arthralgias (  Back, and right hip)  Negative for gait problem  Skin: Negative for color change  Neurological: Negative for dizziness, tremors, syncope, numbness and headaches  Hematological: Negative for adenopathy  Psychiatric/Behavioral: Negative for agitation, confusion and sleep disturbance  The patient is not nervous/anxious            Objective:  Vitals:    04/24/18 1710 04/24/18 1729   BP: 156/90 124/80   Pulse: 69    Resp: 18    Temp: (!) 96 8 °F (36 °C)    SpO2: 97%    Weight: 86 2 kg (190 lb)    Height: 6' 3" (1 905 m)      Body mass index is 23 75 kg/m²  Physical Exam   Constitutional: He is oriented to person, place, and time  He appears well-developed and well-nourished  HENT:   Head: Normocephalic  Mouth/Throat: Oropharynx is clear and moist    Eyes: Conjunctivae are normal  No scleral icterus  Neck: Normal range of motion  No thyromegaly present  Cardiovascular: Normal rate, regular rhythm and normal heart sounds  No murmur heard  Pulmonary/Chest: Effort normal and breath sounds normal  No respiratory distress  He has no wheezes  Abdominal: Soft  Bowel sounds are normal  He exhibits no distension  There is no tenderness  Musculoskeletal: Normal range of motion  He exhibits tenderness ( over the right lateral hip) and deformity ( scoliosis)  He exhibits no edema  Lymphadenopathy:     He has no cervical adenopathy  Neurological: He is alert and oriented to person, place, and time  No cranial nerve deficit  Skin: Skin is warm and dry  No rash noted  No pallor  Psychiatric: He has a normal mood and affect  His behavior is normal    Nursing note and vitals reviewed

## 2018-05-05 DIAGNOSIS — E78.2 COMBINED HYPERLIPIDEMIA: Primary | ICD-10-CM

## 2018-05-05 RX ORDER — SIMVASTATIN 20 MG
TABLET ORAL
Qty: 90 TABLET | Refills: 3 | Status: SHIPPED | OUTPATIENT
Start: 2018-05-05 | End: 2019-01-22 | Stop reason: ALTCHOICE

## 2018-09-07 ENCOUNTER — TELEPHONE (OUTPATIENT)
Dept: CARDIOLOGY CLINIC | Facility: CLINIC | Age: 68
End: 2018-09-07

## 2018-09-07 DIAGNOSIS — I10 HYPERTENSION, UNSPECIFIED TYPE: Primary | ICD-10-CM

## 2018-09-07 DIAGNOSIS — E78.2 MIXED HYPERLIPIDEMIA: ICD-10-CM

## 2018-09-07 NOTE — TELEPHONE ENCOUNTER
PT HAS AN APPT COMING UP W/ DR ISLAS & WOULD LIKE AN ORDER FOR BW MAILED TO HIM SO HE CAN HAVE IT DONE PRIOR TO HIS APPT

## 2018-09-19 ENCOUNTER — OFFICE VISIT (OUTPATIENT)
Dept: CARDIOLOGY CLINIC | Facility: CLINIC | Age: 68
End: 2018-09-19
Payer: COMMERCIAL

## 2018-09-19 VITALS
BODY MASS INDEX: 23.05 KG/M2 | SYSTOLIC BLOOD PRESSURE: 104 MMHG | HEART RATE: 87 BPM | OXYGEN SATURATION: 97 % | HEIGHT: 75 IN | WEIGHT: 185.4 LBS | DIASTOLIC BLOOD PRESSURE: 74 MMHG

## 2018-09-19 DIAGNOSIS — E78.2 MIXED HYPERLIPIDEMIA: ICD-10-CM

## 2018-09-19 DIAGNOSIS — I71.2 THORACIC AORTIC ANEURYSM WITHOUT RUPTURE (HCC): ICD-10-CM

## 2018-09-19 DIAGNOSIS — I10 ESSENTIAL HYPERTENSION: Primary | ICD-10-CM

## 2018-09-19 PROCEDURE — 99213 OFFICE O/P EST LOW 20 MIN: CPT | Performed by: INTERNAL MEDICINE

## 2018-09-19 NOTE — PROGRESS NOTES
[facility] [place]  [address]  [facility]  [address]  Cardiology Follow Up    Gretchen Rodriguez  1950  [id number]      1  Essential hypertension     2  Mixed hyperlipidemia         Chief Complaint   Patient presents with    Follow-up       Interval History:  Patient presents for follow-up visit  Patient denies any history of chest pain   Has shortness of breath with exertion  Patient denies any history of leg edema or orthopnea PND  No history of presyncope syncope  Patient states compliance with the present list of medications      Patient Active Problem List   Diagnosis    Cardiomyopathy ([address] )    Mixed hyperlipidemia    Essential hypertension    Chronic bilateral low back pain without sciatica     Past Medical History:   Diagnosis Date         Benign hypertension     Blood coagulation disorder (HCC)     Hypertriglyceridemia     Lipid metabolism disorder     Neurogenic bladder     Other postprocedural cardiac functional disturbances following cardiac surgery      Social History     Social History    Marital status: /Civil Union     Spouse name: N/A    Number of children: N/A    Years of education: N/A     Occupational History          employed     Social History Main Topics    Smoking status: Former Smoker    Smokeless tobacco: Never Used    Alcohol use Yes    Drug use: No    Sexual activity: Not on file     Other Topics Concern    Not on file     Social History Narrative    Always uses a seatbelt    Daily caffeine consumption, 2-3 servings per day    Seeing a dentist          Family History   Problem Relation Age of Onset    Diabetes Mother     Colon cancer Mother      Past Surgical History:   Procedure Laterality Date    KNEE SURGERY Bilateral     LIPOMA RESECTION         Current Outpatient Prescriptions:     aspirin 81 MG tablet, Take 1 tablet by mouth daily, Disp: , Rfl:     diclofenac sodium (VOLTAREN) 1 %, Apply 2 g topically 4 (four) times a day To affected area, Disp: 100 g, Rfl: 3    lidocaine (LIDODERM) 5 %, , Disp: , Rfl: 0    metoprolol succinate (TOPROL-XL) 50 mg 24 hr tablet, TAKE 1 AND 1/2 TABLETS DAILY  , Disp: 135 tablet, Rfl: 3    oxaprozin (DAYPRO) 600 MG tablet, , Disp: , Rfl:     phenazopyridine (PYRIDIUM) 200 mg tablet, Take 200 mg by mouth 3 (three) times a day as needed, Disp: , Rfl: 0    ramipril (ALTACE) 2 5 mg capsule, Take 1 capsule (2 5 mg total) by mouth daily, Disp: 90 capsule, Rfl: 3    simvastatin (ZOCOR) 20 mg tablet, take 1 tablet by mouth at bedtime, Disp: 90 tablet, Rfl: 3    tamsulosin (FLOMAX) 0 4 mg, Take by mouth, Disp: , Rfl:   No Known Allergies    Labs:  No visits with results within 2 Month(s) from this visit  Latest known visit with results is:   Appointment on 11/07/2017   Component Date Value    Hepatitis C Ab 11/07/2017 Non-reactive      Imaging: No results found  Review of Systems:  Review of Systems   REVIEW OF SYSTEMS:  Constitutional:  Denies fever or chills   Eyes:  Denies change in visual acuity   HENT:  Denies nasal congestion or sore throat   Respiratory:  Denies cough or shortness of breath   Cardiovascular:  Denies chest pain or edema   GI:  Denies abdominal pain, nausea, vomiting, bloody stools or diarrhea   :  Denies dysuria, frequency, difficulty in micturition and nocturia  Musculoskeletal:  Denies back pain or joint pain   Neurologic:  Denies headache, focal weakness or sensory changes   Endocrine:  Denies polyuria or polydipsia   Lymphatic:  Denies swollen glands   Psychiatric:  Denies depression or anxiety     Physical Exam:    /74   Pulse 87   Ht 6' 3" (1 905 m)   Wt 84 1 kg (185 lb 6 4 oz)   SpO2 97%   BMI 23 17 kg/m²     Physical Exam   PHYSICAL EXAM:  General:  Patient is not in acute distress   Head: Normocephalic, Atraumatic  HEENT:  Both pupils normal-size atraumatic, normocephalic, nonicteric  Neck:  JVP not raised   Trachea central  No carotid bruit  Respiratory:  normal breath sounds no crackles  no rhonchi  Cardiovascular:  Regular rate and rhythm no S3 no murmurs  GI:  Abdomen soft nontender  No organomegaly  Lymphatic:  No cervical or inguinal lymphadenopathy  Neurologic:  Patient is awake alert, oriented   Grossly nonfocal    Discussion/Summary:  Patient overall doing well from a cardiovascular standpoint  Results of recent blood work reviewed with the patient  Medications reviewed and refilled  Patient does have history of a dilated thoracic aorta  Last echocardiogram was 4 years ago  Patient will be scheduled for an echocardiogram to reassess ejection fraction as well as look for evidence of aortic valve abnormalities as well as to assess the size of the ascending aorta and aortic root  Symptoms to watch out from cardiac standpoint which would indicate the need for further cardiac evaluation discussed  Follow-up in 6 months  Follow-up with primary care physician    Patient is agreeable with the plan of care

## 2018-11-21 ENCOUNTER — HOSPITAL ENCOUNTER (OUTPATIENT)
Dept: NON INVASIVE DIAGNOSTICS | Facility: CLINIC | Age: 68
Discharge: HOME | End: 2018-11-21
Payer: COMMERCIAL

## 2018-11-21 ENCOUNTER — TELEPHONE (OUTPATIENT)
Dept: CARDIOLOGY CLINIC | Facility: CLINIC | Age: 68
End: 2018-11-21

## 2018-11-21 ENCOUNTER — CLINICAL SUPPORT (OUTPATIENT)
Dept: CARDIOLOGY CLINIC | Facility: CLINIC | Age: 68
End: 2018-11-21
Payer: COMMERCIAL

## 2018-11-21 ENCOUNTER — APPOINTMENT (OUTPATIENT)
Dept: LAB | Facility: CLINIC | Age: 68
End: 2018-11-21
Payer: COMMERCIAL

## 2018-11-21 ENCOUNTER — OFFICE VISIT (OUTPATIENT)
Dept: CARDIOLOGY CLINIC | Facility: CLINIC | Age: 68
End: 2018-11-21
Payer: COMMERCIAL

## 2018-11-21 VITALS
HEART RATE: 90 BPM | WEIGHT: 186.4 LBS | HEIGHT: 75 IN | BODY MASS INDEX: 23.18 KG/M2 | SYSTOLIC BLOOD PRESSURE: 99 MMHG | DIASTOLIC BLOOD PRESSURE: 68 MMHG | OXYGEN SATURATION: 97 %

## 2018-11-21 DIAGNOSIS — I48.91 NEW ONSET A-FIB (HCC): ICD-10-CM

## 2018-11-21 DIAGNOSIS — I71.2 THORACIC AORTIC ANEURYSM WITHOUT RUPTURE (HCC): ICD-10-CM

## 2018-11-21 DIAGNOSIS — E78.2 MIXED HYPERLIPIDEMIA: ICD-10-CM

## 2018-11-21 DIAGNOSIS — I48.91 ATRIAL FIBRILLATION, UNSPECIFIED TYPE (HCC): Primary | ICD-10-CM

## 2018-11-21 DIAGNOSIS — I42.9 CARDIOMYOPATHY, UNSPECIFIED TYPE (HCC): Primary | ICD-10-CM

## 2018-11-21 DIAGNOSIS — I10 ESSENTIAL HYPERTENSION: ICD-10-CM

## 2018-11-21 LAB
ALBUMIN SERPL BCP-MCNC: 4 G/DL (ref 3.5–5)
ALP SERPL-CCNC: 82 U/L (ref 46–116)
ALT SERPL W P-5'-P-CCNC: 32 U/L (ref 12–78)
ANION GAP SERPL CALCULATED.3IONS-SCNC: 2 MMOL/L (ref 4–13)
AST SERPL W P-5'-P-CCNC: 20 U/L (ref 5–45)
BASOPHILS # BLD AUTO: 0.02 THOUSANDS/ΜL (ref 0–0.1)
BASOPHILS NFR BLD AUTO: 0 % (ref 0–1)
BILIRUB SERPL-MCNC: 0.49 MG/DL (ref 0.2–1)
BUN SERPL-MCNC: 17 MG/DL (ref 5–25)
CALCIUM SERPL-MCNC: 9.1 MG/DL (ref 8.3–10.1)
CHLORIDE SERPL-SCNC: 107 MMOL/L (ref 100–108)
CO2 SERPL-SCNC: 30 MMOL/L (ref 21–32)
CREAT SERPL-MCNC: 1.11 MG/DL (ref 0.6–1.3)
EOSINOPHIL # BLD AUTO: 0.06 THOUSAND/ΜL (ref 0–0.61)
EOSINOPHIL NFR BLD AUTO: 1 % (ref 0–6)
ERYTHROCYTE [DISTWIDTH] IN BLOOD BY AUTOMATED COUNT: 11.8 % (ref 11.6–15.1)
GFR SERPL CREATININE-BSD FRML MDRD: 69 ML/MIN/1.73SQ M
GLUCOSE P FAST SERPL-MCNC: 63 MG/DL (ref 65–99)
HCT VFR BLD AUTO: 46.6 % (ref 36.5–49.3)
HGB BLD-MCNC: 15.8 G/DL (ref 12–17)
IMM GRANULOCYTES # BLD AUTO: 0.02 THOUSAND/UL (ref 0–0.2)
IMM GRANULOCYTES NFR BLD AUTO: 0 % (ref 0–2)
LYMPHOCYTES # BLD AUTO: 1.26 THOUSANDS/ΜL (ref 0.6–4.47)
LYMPHOCYTES NFR BLD AUTO: 25 % (ref 14–44)
MCH RBC QN AUTO: 33.8 PG (ref 26.8–34.3)
MCHC RBC AUTO-ENTMCNC: 33.9 G/DL (ref 31.4–37.4)
MCV RBC AUTO: 100 FL (ref 82–98)
MONOCYTES # BLD AUTO: 0.41 THOUSAND/ΜL (ref 0.17–1.22)
MONOCYTES NFR BLD AUTO: 8 % (ref 4–12)
NEUTROPHILS # BLD AUTO: 3.31 THOUSANDS/ΜL (ref 1.85–7.62)
NEUTS SEG NFR BLD AUTO: 66 % (ref 43–75)
NRBC BLD AUTO-RTO: 0 /100 WBCS
PLATELET # BLD AUTO: 188 THOUSANDS/UL (ref 149–390)
PMV BLD AUTO: 11.4 FL (ref 8.9–12.7)
POTASSIUM SERPL-SCNC: 4.5 MMOL/L (ref 3.5–5.3)
PROT SERPL-MCNC: 6.9 G/DL (ref 6.4–8.2)
RBC # BLD AUTO: 4.67 MILLION/UL (ref 3.88–5.62)
SODIUM SERPL-SCNC: 139 MMOL/L (ref 136–145)
TSH SERPL DL<=0.05 MIU/L-ACNC: 2.38 UIU/ML (ref 0.36–3.74)
WBC # BLD AUTO: 5.08 THOUSAND/UL (ref 4.31–10.16)

## 2018-11-21 PROCEDURE — 85025 COMPLETE CBC W/AUTO DIFF WBC: CPT | Performed by: INTERNAL MEDICINE

## 2018-11-21 PROCEDURE — 93226 XTRNL ECG REC<48 HR SCAN A/R: CPT

## 2018-11-21 PROCEDURE — 36415 COLL VENOUS BLD VENIPUNCTURE: CPT | Performed by: INTERNAL MEDICINE

## 2018-11-21 PROCEDURE — 99214 OFFICE O/P EST MOD 30 MIN: CPT | Performed by: INTERNAL MEDICINE

## 2018-11-21 PROCEDURE — 84443 ASSAY THYROID STIM HORMONE: CPT | Performed by: INTERNAL MEDICINE

## 2018-11-21 PROCEDURE — 93306 TTE W/DOPPLER COMPLETE: CPT

## 2018-11-21 PROCEDURE — 93225 XTRNL ECG REC<48 HRS REC: CPT

## 2018-11-21 PROCEDURE — 93306 TTE W/DOPPLER COMPLETE: CPT | Performed by: INTERNAL MEDICINE

## 2018-11-21 PROCEDURE — 93000 ELECTROCARDIOGRAM COMPLETE: CPT | Performed by: INTERNAL MEDICINE

## 2018-11-21 PROCEDURE — 80053 COMPREHEN METABOLIC PANEL: CPT | Performed by: INTERNAL MEDICINE

## 2018-11-21 RX ORDER — ASPIRIN 325 MG
325 TABLET ORAL DAILY
COMMUNITY
End: 2018-11-21 | Stop reason: ALTCHOICE

## 2018-11-21 NOTE — TELEPHONE ENCOUNTER
----- Message from Travis Inman MD sent at 11/21/2018  4:05 PM EST -----  Please call  Lab work overall good  Echocardiogram shows ejection fraction of 50% with mild mitral regurgitation  Will discuss further next visit

## 2018-11-21 NOTE — PROGRESS NOTES
[facility]  [address]  [facility]  [address]  Cardiology Follow Up    Emaline Goodness  1950  [id number]      1  Cardiomyopathy, unspecified type ([facility] )     2  Essential hypertension     3  Mixed hyperlipidemia     4  New onset a-fib [facility])         Chief Complaint   Patient presents with    Follow-up       Interval History:  Patient presents today after he was found to be in a regular heart rhythm during his echocardiogram   Patient had an EKG which showed atrial fibrillation which is new for him  Patient did have an episode of atrial fibrillation approximately 10 years ago which resolved spontaneously  Patient does have history of mild cardiomyopathy in the past   Patient also has history of hypertension and he 79years old  Patient denies any bleeding issues  Patient denies any chest pain shortness of breath  Patient did have a bad cold and took some decongestants last week  Not sure whether this triggered his atrial fibrillation  He states that he has been compliant with all his present medications      Patient Active Problem List   Diagnosis    Cardiomyopathy ([facility] )    Mixed hyperlipidemia    Essential hypertension    Chronic bilateral low back pain without sciatica    New onset a-fib ([facility] )     Past Medical History:   Diagnosis Date    [facility]     Benign hypertension     Blood coagulation disorder ([facility] )     Hypertriglyceridemia     Lipid metabolism disorder     Neurogenic bladder     Other postprocedural cardiac functional disturbances following cardiac surgery      Social History     Social History    Marital status: /Civil Union     Spouse name: N/A    Number of children: N/A    Years of education: N/A     Occupational History          employed     Social History Main Topics    Smoking status: Former Smoker    Smokeless tobacco: Never Used    Alcohol use Yes    Drug use: No    Sexual activity: Not on file     Other Topics Concern    Not on file     Social History Narrative    Always uses a seatbelt    Daily caffeine consumption, 2-3 servings per day    Seeing a dentist          Family History   Problem Relation Age of Onset    Diabetes Mother     Colon cancer Mother      Past Surgical History:   Procedure Laterality Date    KNEE SURGERY Bilateral     LIPOMA RESECTION         Current Outpatient Prescriptions:     aspirin 325 mg tablet, Take 325 mg by mouth daily, Disp: , Rfl:     lidocaine (LIDODERM) 5 %, , Disp: , Rfl: 0    metoprolol succinate (TOPROL-XL) 50 mg 24 hr tablet, TAKE 1 AND 1/2 TABLETS DAILY  , Disp: 135 tablet, Rfl: 3    oxaprozin (DAYPRO) 600 MG tablet, , Disp: , Rfl:     ramipril (ALTACE) 2 5 mg capsule, Take 1 capsule (2 5 mg total) by mouth daily, Disp: 90 capsule, Rfl: 3    simvastatin (ZOCOR) 20 mg tablet, take 1 tablet by mouth at bedtime, Disp: 90 tablet, Rfl: 3    tamsulosin (FLOMAX) 0 4 mg, Take 0 4 mg by mouth daily with dinner  , Disp: , Rfl:     aspirin 81 MG tablet, Take 1 tablet by mouth daily, Disp: , Rfl:     diclofenac sodium (VOLTAREN) 1 %, Apply 2 g topically 4 (four) times a day To affected area (Patient not taking: Reported on 11/21/2018 ), Disp: 100 g, Rfl: 3    phenazopyridine (PYRIDIUM) 200 mg tablet, Take 200 mg by mouth 3 (three) times a day as needed, Disp: , Rfl: 0  No Known Allergies    Labs:  No visits with results within 2 Month(s) from this visit  Latest known visit with results is:   Appointment on 11/07/2017   Component Date Value    Hepatitis C Ab 11/07/2017 Non-reactive      Imaging: No results found      Review of Systems:  Review of Systems   REVIEW OF SYSTEMS:  Constitutional:  Denies fever or chills   Eyes:  Denies change in visual acuity   HENT:  Denies nasal congestion or sore throat   Respiratory:  Recent bad cold  Cardiovascular:  Denies chest pain or edema   GI:  Denies abdominal pain, nausea, vomiting, bloody stools or diarrhea   :  Denies dysuria, frequency, difficulty in micturition and nocturia  Musculoskeletal:  Denies back pain or joint pain   Neurologic:  Denies headache, focal weakness or sensory changes   Endocrine:  Denies polyuria or polydipsia   Lymphatic:  Denies swollen glands   Psychiatric:  Denies depression or anxiety     Physical Exam:    BP 99/68   Pulse 90   Ht 6' 3" (1 905 m)   Wt 84 6 kg (186 lb 6 4 oz)   SpO2 97%   BMI 23 30 kg/m²     Physical Exam   PHYSICAL EXAM:  General:  Patient is not in acute distress   Head: Normocephalic, Atraumatic  HEENT:  Both pupils normal-size atraumatic, normocephalic, nonicteric  Neck:  JVP not raised  Trachea central  No carotid bruit  Respiratory:  normal breath sounds no crackles  no rhonchi  Cardiovascular:  Irregularly irregular  GI:  Abdomen soft nontender  No organomegaly  Lymphatic:  No cervical or inguinal lymphadenopathy  Neurologic:  Patient is awake alert, oriented   Grossly nonfocal    Discussion/Summary:  Patient with new onset atrial fibrillation of unclear etiology  Patient did have a recent bad cold and to be congested aunts  Patient will have blood work including thyroid function test   Lengthy discussion with patient regarding etiology and pathogenesis of atrial fibrillation  Patient's CHADS2 score is 2  Risks and benefits of anticoagulation to prevent thromboembolic risk discussed with the patient  Patient will be started on Eliquis 5 mg twice a day, samples followed by prescription  Patient report any bleeding issues  Patient has been instructed not to take any NSAIDs  Discontinue aspirin  Follow-up on the results of echocardiogram   Follow-up in a few weeks or earlier as needed  If patient continues to have persistent atrial fibrillation, considerations for cardioversion will be discussed  Information booklet regarding atrial fibrillation provided to the patient  24 hr Holter monitor to assess heart rate response with atrial fibrillation    Patient is on beta-blockers  Patient is agreeable with the plan of care  Time is 25 min  50% of the time was spent in counseling coordination of care

## 2018-11-29 ENCOUNTER — APPOINTMENT (EMERGENCY)
Dept: RADIOLOGY | Facility: HOSPITAL | Age: 68
End: 2018-11-29
Payer: COMMERCIAL

## 2018-11-29 ENCOUNTER — TELEPHONE (OUTPATIENT)
Dept: CARDIOLOGY CLINIC | Facility: CLINIC | Age: 68
End: 2018-11-29

## 2018-11-29 ENCOUNTER — HOSPITAL ENCOUNTER (OUTPATIENT)
Facility: HOSPITAL | Age: 68
Setting detail: OBSERVATION
Discharge: HOME | End: 2018-11-30
Attending: EMERGENCY MEDICINE | Admitting: STUDENT IN AN ORGANIZED HEALTH CARE EDUCATION/TRAINING PROGRAM
Payer: COMMERCIAL

## 2018-11-29 DIAGNOSIS — R00.2 PALPITATIONS: ICD-10-CM

## 2018-11-29 DIAGNOSIS — R07.9 CHEST PAIN: Primary | ICD-10-CM

## 2018-11-29 PROBLEM — I48.92 ATRIAL FLUTTER (HCC): Status: ACTIVE | Noted: 2018-11-29

## 2018-11-29 LAB
ANION GAP SERPL CALCULATED.3IONS-SCNC: 9 MMOL/L (ref 4–13)
BASOPHILS # BLD AUTO: 0.01 THOUSANDS/ΜL (ref 0–0.1)
BASOPHILS NFR BLD AUTO: 0 % (ref 0–1)
BUN SERPL-MCNC: 18 MG/DL (ref 5–25)
CALCIUM SERPL-MCNC: 9.5 MG/DL (ref 8.3–10.1)
CHLORIDE SERPL-SCNC: 103 MMOL/L (ref 100–108)
CO2 SERPL-SCNC: 28 MMOL/L (ref 21–32)
CREAT SERPL-MCNC: 1.03 MG/DL (ref 0.6–1.3)
EOSINOPHIL # BLD AUTO: 0.07 THOUSAND/ΜL (ref 0–0.61)
EOSINOPHIL NFR BLD AUTO: 1 % (ref 0–6)
ERYTHROCYTE [DISTWIDTH] IN BLOOD BY AUTOMATED COUNT: 11.8 % (ref 11.6–15.1)
GFR SERPL CREATININE-BSD FRML MDRD: 75 ML/MIN/1.73SQ M
GLUCOSE SERPL-MCNC: 99 MG/DL (ref 65–140)
HCT VFR BLD AUTO: 46.6 % (ref 36.5–49.3)
HGB BLD-MCNC: 16 G/DL (ref 12–17)
IMM GRANULOCYTES # BLD AUTO: 0.02 THOUSAND/UL (ref 0–0.2)
IMM GRANULOCYTES NFR BLD AUTO: 0 % (ref 0–2)
LYMPHOCYTES # BLD AUTO: 1.1 THOUSANDS/ΜL (ref 0.6–4.47)
LYMPHOCYTES NFR BLD AUTO: 22 % (ref 14–44)
MAGNESIUM SERPL-MCNC: 2.2 MG/DL (ref 1.6–2.6)
MCH RBC QN AUTO: 33.5 PG (ref 26.8–34.3)
MCHC RBC AUTO-ENTMCNC: 34.3 G/DL (ref 31.4–37.4)
MCV RBC AUTO: 98 FL (ref 82–98)
MONOCYTES # BLD AUTO: 0.41 THOUSAND/ΜL (ref 0.17–1.22)
MONOCYTES NFR BLD AUTO: 8 % (ref 4–12)
NEUTROPHILS # BLD AUTO: 3.4 THOUSANDS/ΜL (ref 1.85–7.62)
NEUTS SEG NFR BLD AUTO: 69 % (ref 43–75)
NRBC BLD AUTO-RTO: 0 /100 WBCS
PLATELET # BLD AUTO: 183 THOUSANDS/UL (ref 149–390)
PMV BLD AUTO: 10.9 FL (ref 8.9–12.7)
POTASSIUM SERPL-SCNC: 3.9 MMOL/L (ref 3.5–5.3)
RBC # BLD AUTO: 4.78 MILLION/UL (ref 3.88–5.62)
SODIUM SERPL-SCNC: 140 MMOL/L (ref 136–145)
TROPONIN I SERPL-MCNC: <0.02 NG/ML
WBC # BLD AUTO: 5.01 THOUSAND/UL (ref 4.31–10.16)

## 2018-11-29 PROCEDURE — 85025 COMPLETE CBC W/AUTO DIFF WBC: CPT | Performed by: EMERGENCY MEDICINE

## 2018-11-29 PROCEDURE — 83735 ASSAY OF MAGNESIUM: CPT | Performed by: EMERGENCY MEDICINE

## 2018-11-29 PROCEDURE — 99220 PR INITIAL OBSERVATION CARE/DAY 70 MINUTES: CPT | Performed by: FAMILY MEDICINE

## 2018-11-29 PROCEDURE — 99285 EMERGENCY DEPT VISIT HI MDM: CPT

## 2018-11-29 PROCEDURE — 93227 XTRNL ECG REC<48 HR R&I: CPT | Performed by: INTERNAL MEDICINE

## 2018-11-29 PROCEDURE — 84484 ASSAY OF TROPONIN QUANT: CPT | Performed by: EMERGENCY MEDICINE

## 2018-11-29 PROCEDURE — 80048 BASIC METABOLIC PNL TOTAL CA: CPT | Performed by: EMERGENCY MEDICINE

## 2018-11-29 PROCEDURE — 93005 ELECTROCARDIOGRAM TRACING: CPT

## 2018-11-29 PROCEDURE — 36415 COLL VENOUS BLD VENIPUNCTURE: CPT | Performed by: EMERGENCY MEDICINE

## 2018-11-29 PROCEDURE — 71046 X-RAY EXAM CHEST 2 VIEWS: CPT

## 2018-11-29 RX ORDER — PRAVASTATIN SODIUM 20 MG
20 TABLET ORAL
Status: DISCONTINUED | OUTPATIENT
Start: 2018-11-29 | End: 2018-11-30 | Stop reason: HOSPADM

## 2018-11-29 RX ORDER — 0.9 % SODIUM CHLORIDE 0.9 %
3 VIAL (ML) INJECTION AS NEEDED
Status: DISCONTINUED | OUTPATIENT
Start: 2018-11-29 | End: 2018-11-30 | Stop reason: HOSPADM

## 2018-11-29 RX ORDER — ONDANSETRON 2 MG/ML
4 INJECTION INTRAMUSCULAR; INTRAVENOUS EVERY 6 HOURS PRN
Status: DISCONTINUED | OUTPATIENT
Start: 2018-11-29 | End: 2018-11-30 | Stop reason: HOSPADM

## 2018-11-29 RX ORDER — LISINOPRIL 5 MG/1
5 TABLET ORAL DAILY
Status: DISCONTINUED | OUTPATIENT
Start: 2018-11-30 | End: 2018-11-30 | Stop reason: HOSPADM

## 2018-11-29 RX ORDER — TAMSULOSIN HYDROCHLORIDE 0.4 MG/1
0.4 CAPSULE ORAL
Status: DISCONTINUED | OUTPATIENT
Start: 2018-11-29 | End: 2018-11-30 | Stop reason: HOSPADM

## 2018-11-29 RX ADMIN — TAMSULOSIN HYDROCHLORIDE 0.4 MG: 0.4 CAPSULE ORAL at 19:40

## 2018-11-29 RX ADMIN — APIXABAN 5 MG: 5 TABLET, FILM COATED ORAL at 19:40

## 2018-11-29 NOTE — ASSESSMENT & PLAN NOTE
-improved  -continue beta-blocker  -continue Eliquis  -continue telemetric  -cardiology recommendation

## 2018-11-29 NOTE — H&P
H&P- Fatoumata Boles 1950, 79 y o  male MRN: [id number]    Unit/Bed#: FT 01 Encounter: [id number]    Primary Care Provider: Alonso Nassar DO   Date and time admitted to hospital: 11/29/2018  4:41 PM        Atrial flutter ([facility] )   Assessment & Plan    -improved  -continue beta-blocker  -continue Eliquis  -continue telemetric  -cardiology recommendation     Essential hypertension   Assessment & Plan    Stable  Continue beta-blocker     Mixed hyperlipidemia   Assessment & Plan    Continue statin       VTE Prophylaxis: Apixaban (Eliquis)  / sequential compression device   Code Status:  Full code  POLST: There is no POLST form on file for this patient (pre-hospital)  Discussion with family:   Anticipated Length of Stay:  Patient will be admitted on an Observation basis with an anticipated length of stay of  < 2 midnights  Justification for Hospital Stay:  Palpitation secondary to the floor    Total Time for Visit, including Counseling / Coordination of Care: 1 hour  Greater than 50% of this total time spent on direct patient counseling and coordination of care  Chief Complaint:   Palpitation    History of Present Illness:    Fatoumata Boles is a 79 y o  male significant past medical history of HTN, hyperlipidemia, atrial flutter, who came to the emergency department complaining of increase of palpitation for the last 24 hours, patient stated he has been having the sensation that his heart is getting out of his chest  Patient states he woke this morning reason why he came to ED  Last week was wearing a holter monitor  He states while he has that palpitation that last 10 min was accompanied of chest pain, shortness which at this point it improved  Review of Systems:    Review of Systems   Constitutional: Negative for activity change, chills and diaphoresis  Eyes: Negative for pain, discharge, redness and itching  Respiratory: Positive for shortness of breath   Negative for apnea, choking, chest tightness, wheezing and stridor  Cardiovascular: Positive for palpitations  Gastrointestinal: Negative for abdominal distention, abdominal pain, anal bleeding, blood in stool, constipation, diarrhea and nausea  Endocrine: Negative for cold intolerance, heat intolerance and polydipsia  Genitourinary: Negative for difficulty urinating, dysuria, enuresis and flank pain  Musculoskeletal: Negative for arthralgias, back pain, gait problem and joint swelling  Skin: Negative for color change, pallor and rash  Neurological: Negative for dizziness, facial asymmetry, light-headedness and headaches  Hematological: Negative for adenopathy  Psychiatric/Behavioral: Negative for agitation  Past Medical and Surgical History:     Past Medical History:   Diagnosis Date    Atrial fibrillation ([facility] )     Benign hypertension     Blood coagulation disorder (HCC)     Hypertriglyceridemia     Lipid metabolism disorder     Neurogenic bladder     Other postprocedural cardiac functional disturbances following cardiac surgery        Past Surgical History:   Procedure Laterality Date    KNEE SURGERY Bilateral     LIPOMA RESECTION         Meds/Allergies:    Prior to Admission medications    Medication Sig Start Date End Date Taking? Authorizing Provider   apixaban (ELIQUIS) 5 mg Take 1 tablet (5 mg total) by mouth 2 (two) times a day 11/21/18  Yes Aide Mcallister MD   metoprolol succinate (TOPROL-XL) 50 mg 24 hr tablet TAKE 1 AND 1/2 TABLETS DAILY   4/21/18  Yes Aide Mcallister MD   simvastatin (ZOCOR) 20 mg tablet take 1 tablet by mouth at bedtime 5/5/18  Yes Aide Mcallister MD   lidocaine (LIDODERM) 5 %  11/18/17   Historical Provider, MD   oxaprozin (DAYPRO) 600 MG tablet  3/28/18   Historical Provider, MD   phenazopyridine (PYRIDIUM) 200 mg tablet Take 200 mg by mouth 3 (three) times a day as needed 12/18/17   Historical Provider, MD   ramipril (ALTACE) 2 5 mg capsule Take 1 capsule (2 5 mg total) by mouth daily 2/5/18   Travis Inman MD   tamsulosin (FLOMAX) 0 4 mg Take 0 4 mg by mouth daily with dinner   10/12/14   Historical Provider, MD     I have reviewed home medications with patient personally  Allergies: No Known Allergies    Social History:     Marital Status: /Civil Union   Occupation:   Patient Pre-hospital Living Situation:   Patient Pre-hospital Level of Mobility:   Patient Pre-hospital Diet Restrictions:   Substance Use History:   History   Alcohol Use    Yes     History   Smoking Status    Former Smoker   Smokeless Tobacco    Never Used     History   Drug Use No       Family History:    non-contributory    Physical Exam:     Vitals:   Blood Pressure: 120/76 (11/29/18 1700)  Pulse: 67 (11/29/18 1700)  Temperature: 98 4 °F (36 9 °C) (11/29/18 1601)  Temp Source: Oral (11/29/18 1601)  Respirations: 15 (11/29/18 1700)  Height: 6' 3" (190 5 cm) (11/29/18 1600)  Weight - Scale: 84 6 kg (186 lb 8 2 oz) (11/29/18 1600)  SpO2: 98 % (11/29/18 1700)    Physical Exam   Constitutional: He is oriented to person, place, and time  No distress  HENT:   Head: Normocephalic and atraumatic  Right Ear: External ear normal    Left Ear: External ear normal    Cardiovascular: Normal rate, normal heart sounds and intact distal pulses  Exam reveals no gallop and no friction rub  No murmur heard  Irregular rhythm   Pulmonary/Chest: Effort normal  No respiratory distress  He has no wheezes  He has no rales  He exhibits no tenderness  Abdominal: Soft  Bowel sounds are normal  He exhibits no distension and no mass  There is no tenderness  There is no rebound and no guarding  Musculoskeletal: He exhibits no edema or tenderness  Neurological: He is alert and oriented to person, place, and time  He has normal reflexes  He displays normal reflexes  No cranial nerve deficit  He exhibits normal muscle tone  Coordination normal    Skin: Skin is warm  He is not diaphoretic     Psychiatric: He has a normal mood and affect  Additional Data:     Lab Results: I have personally reviewed pertinent reports  Results from last 7 days  Lab Units 11/29/18  1719   WBC Thousand/uL 5 01   HEMOGLOBIN g/dL 16 0   HEMATOCRIT % 46 6   PLATELETS Thousands/uL 183   NEUTROS PCT % 69   LYMPHS PCT % 22   MONOS PCT % 8   EOS PCT % 1       Results from last 7 days  Lab Units 11/29/18  1719   SODIUM mmol/L 140   POTASSIUM mmol/L 3 9   CHLORIDE mmol/L 103   CO2 mmol/L 28   BUN mg/dL 18   CREATININE mg/dL 1 03   ANION GAP mmol/L 9   CALCIUM mg/dL 9 5   GLUCOSE RANDOM mg/dL 99                       Imaging: I have personally reviewed pertinent reports  X-ray chest 2 views    (Results Pending)       EKG, Pathology, and Other Studies Reviewed on Admission:   · EKG: NSR  No st changes    Allscripts / Epic Records Reviewed: Yes     ** Please Note: This note has been constructed using a voice recognition system   **

## 2018-11-29 NOTE — TELEPHONE ENCOUNTER
PT HAD CHEST PAIN, WEAKNESS, AND RACING HEART FOR 5 TO 7 MINUTES THIS MORNING AROUND 700AM  PT SAID HE HAS FELT A LITTLE WEIRD ALL DAY  TRANSFERRED CALL TO [facility]

## 2018-11-29 NOTE — ED PROVIDER NOTES
History  Chief Complaint   Patient presents with    Palpitations     Pt diagnosed with a fib/a flutter, has an appt with Dr Moe next week  Pt states he was having palpitations this morning and heart was racing  Pt currently on blood thinners     HPI  80-year-old male with recent diagnosis of a AFib/flutter presents to the emergency department with palpitations  Patient states that he had an episode of palpitations this morning that lasted 5-7 minutes and was associated with rapid heart rate (pulse 120, seemed to be regular per patient)  Palpitations resolved without intervention, but throughout the course of the day patient has had intermittent burning chest pain and another episode of palpitations this afternoon  He reports associated lightheadedness  Of note, patient recently underwent outpatient ECHO that revealed new onset atrial fibrillation  (Patient does have remote history of a fib that resolved spontaneously)  He has since been evaluated with Holter that revealed persistent a fib/flutter and he is scheduled to see his cardiologist on Monday  Patient reports compliance with all medications including recently started Eliquis  Discussed with on-call cardiologist who recommended overnight admission for inpatient stress test        Prior to Admission Medications   Prescriptions Last Dose Informant Patient Reported? Taking? apixaban (ELIQUIS) 5 mg   No Yes   Sig: Take 1 tablet (5 mg total) by mouth 2 (two) times a day   lidocaine (LIDODERM) 5 %  Self Yes No   metoprolol succinate (TOPROL-XL) 50 mg 24 hr tablet  Self No Yes   Sig: TAKE 1 AND 1/2 TABLETS DAILY     ramipril (ALTACE) 2 5 mg capsule  Self No No   Sig: Take 1 capsule (2 5 mg total) by mouth daily   simvastatin (ZOCOR) 20 mg tablet  Self No Yes   Sig: take 1 tablet by mouth at bedtime   tamsulosin (FLOMAX) 0 4 mg  Self Yes No   Sig: Take 0 4 mg by mouth daily with dinner     traMADol (ULTRAM) 50 mg tablet   No No   Sig: Take 1 tablet (50 mg total) by mouth every 6 (six) hours as needed for moderate pain      Facility-Administered Medications: None       Past Medical History:   Diagnosis Date    Atrial fibrillation (HCC)     Benign hypertension     Blood coagulation disorder (HCC)     Hypertriglyceridemia     Lipid metabolism disorder     Neurogenic bladder     Other postprocedural cardiac functional disturbances following cardiac surgery        Past Surgical History:   Procedure Laterality Date    KNEE SURGERY Bilateral     LIPOMA RESECTION         Family History   Problem Relation Age of Onset    Diabetes Mother     Colon cancer Mother      I have reviewed and agree with the history as documented  Social History   Substance Use Topics    Smoking status: Former Smoker    Smokeless tobacco: Never Used    Alcohol use Yes        Review of Systems   Constitutional: Negative for chills and fever  Respiratory: Positive for shortness of breath  Cardiovascular: Positive for chest pain and palpitations  Gastrointestinal: Negative for abdominal pain, nausea and vomiting  Skin: Negative for rash and wound  Allergic/Immunologic: Negative for immunocompromised state  Neurological: Positive for light-headedness  Negative for headaches  Psychiatric/Behavioral: The patient is not nervous/anxious  All other systems reviewed and are negative  Physical Exam  Physical Exam   Constitutional: He is oriented to person, place, and time  He appears well-nourished  No distress  HENT:   Head: Normocephalic and atraumatic  Eyes: EOM are normal    Neck: Normal range of motion  Neck supple  Cardiovascular: Normal rate and regular rhythm  Pulmonary/Chest: Effort normal and breath sounds normal  No respiratory distress  Abdominal: Soft  He exhibits no distension  There is no tenderness  Musculoskeletal: Normal range of motion  Neurological: He is alert and oriented to person, place, and time     Skin: Skin is warm and dry  He is not diaphoretic  Psychiatric: He has a normal mood and affect  His behavior is normal    Nursing note and vitals reviewed  Vital Signs  ED Triage Vitals   Temperature Pulse Respirations Blood Pressure SpO2   11/29/18 1601 11/29/18 1600 11/29/18 1600 11/29/18 1600 11/29/18 1600   98 4 °F (36 9 °C) 75 16 130/80 99 %      Temp Source Heart Rate Source Patient Position - Orthostatic VS BP Location FiO2 (%)   11/29/18 1600 11/29/18 1600 11/29/18 1600 11/29/18 1600 --   Oral Monitor Sitting Left arm       Pain Score       11/29/18 1600       No Pain           Vitals:    11/30/18 0407 11/30/18 0700 11/30/18 0832 11/30/18 1256   BP: 115/64 127/77 110/70 107/70   Pulse: 82 74  58   Patient Position - Orthostatic VS: Lying Sitting  Sitting       Visual Acuity      ED Medications    Diagnostic Studies  Results Reviewed     Procedure Component Value Units Date/Time    Troponin I [[id number]]  (Normal) Collected:  11/29/18 1719    Lab Status:  Final result Specimen:  Blood from Arm, Left Updated:  11/29/18 1745     Troponin I <0 02 ng/mL     Basic metabolic panel [[id number]] Collected:  11/29/18 1719    Lab Status:  Final result Specimen:  Blood from Arm, Left Updated:  11/29/18 1738     Sodium 140 mmol/L      Potassium 3 9 mmol/L      Chloride 103 mmol/L      CO2 28 mmol/L      ANION GAP 9 mmol/L      BUN 18 mg/dL      Creatinine 1 03 mg/dL      Glucose 99 mg/dL      Calcium 9 5 mg/dL      eGFR 75 ml/min/1 73sq m     Narrative:         National Kidney Disease Education Program recommendations are as follows:  GFR calculation is accurate only with a steady state creatinine  Chronic Kidney disease less than 60 ml/min/1 73 sq  meters  Kidney failure less than 15 ml/min/1 73 sq  meters      Magnesium [[id number]]  (Normal) Collected:  11/29/18 1719    Lab Status:  Final result Specimen:  Blood from Arm, Left Updated:  11/29/18 1738     Magnesium 2 2 mg/dL     CBC and differential [[id number]] Collected:  11/29/18 1719    Lab Status:  Final result Specimen:  Blood from Arm, Left Updated:  11/29/18 1728     WBC 5 01 Thousand/uL      RBC 4 78 Million/uL      Hemoglobin 16 0 g/dL      Hematocrit 46 6 %      MCV 98 fL      MCH 33 5 pg      MCHC 34 3 g/dL      RDW 11 8 %      MPV 10 9 fL      Platelets 347 Thousands/uL      nRBC 0 /100 WBCs      Neutrophils Relative 69 %      Immat GRANS % 0 %      Lymphocytes Relative 22 %      Monocytes Relative 8 %      Eosinophils Relative 1 %      Basophils Relative 0 %      Neutrophils Absolute 3 40 Thousands/µL      Immature Grans Absolute 0 02 Thousand/uL      Lymphocytes Absolute 1 10 Thousands/µL      Monocytes Absolute 0 41 Thousand/µL      Eosinophils Absolute 0 07 Thousand/µL      Basophils Absolute 0 01 Thousands/µL                  X-ray chest 2 views   Final Result by Chetan Kong MD (11/30 2103)      No radiographic evidence of acute intrathoracic process  Workstation performed: [id number]                    Procedures  Procedures       Phone Contacts  ED Phone Contact    ED Course  ED Course as of Dec 03 0319   Thu Nov 29, 2018   1706 EKG: NSR @ 71 BPM with first degree AV block, normal axis, no prior?     65 Discussed with Dr Benitez Gordon, recommended obs admission, stress test          HEART Risk Score      Most Recent Value   History  1 Filed at: 12/03/2018 0318   ECG  0 Filed at: 12/03/2018 0318   Age  2 Filed at: 12/03/2018 0318   Risk Factors  1 Filed at: 12/03/2018 0318   Troponin  0 Filed at: 12/03/2018 0318   Heart Score Risk Calculator   History  1 Filed at: 12/03/2018 0318   ECG  0 Filed at: 12/03/2018 0318   Age  2 Filed at: 12/03/2018 0318   Risk Factors  1 Filed at: 12/03/2018 0318   Troponin  0 Filed at: 12/03/2018 0318   HEART Score  4 Filed at: 12/03/2018 0318   HEART Score  4 Filed at: 12/03/2018 1093        Identification of Seniors at Risk      Most Recent Value   (ISAR) Identification of Seniors at Risk   Before the illness or injury that brought you to the Emergency, did you need someone to help you on a regular basis? 0 Filed at: 11/29/2018 1601   In the last 24 hours, have you needed more help than usual?  0 Filed at: 11/29/2018 1601   Have you been hospitalized for one or more nights during the past 6 months? 0 Filed at: 11/29/2018 1601   In general, do you see well?  0 Filed at: 11/29/2018 1601   In general, do you have serious problems with your memory? 0 Filed at: 11/29/2018 1601   Do you take more than three different medications every day? 1 Filed at: 11/29/2018 1601   ISAR Score  1 Filed at: 11/29/2018 1601                          MDM  CritCare Time    Disposition  Final diagnoses:   Chest pain   Palpitations     Time reflects when diagnosis was documented in both MDM as applicable and the Disposition within this note     Time User Action Codes Description Comment    11/29/2018  5:33 PM eLobardo Desir Add [R07 9] Chest pain     11/29/2018  5:33 PM Xiomara Acharya Add [R00 2] Palpitations       ED Disposition     ED Disposition Condition Comment    Admit  Case was discussed with Dr Irlanda Dickerson and the patient's admission status was agreed to be Admission Status: observation status to the service of Dr Irlanda Dickerson           Follow-up Information    None         Discharge Medication List as of 11/30/2018  2:38 PM      CONTINUE these medications which have NOT CHANGED    Details   apixaban (ELIQUIS) 5 mg Take 1 tablet (5 mg total) by mouth 2 (two) times a day, Starting Wed 11/21/2018, No Print      metoprolol succinate (TOPROL-XL) 50 mg 24 hr tablet TAKE 1 AND 1/2 TABLETS DAILY , Normal      simvastatin (ZOCOR) 20 mg tablet take 1 tablet by mouth at bedtime, Normal      lidocaine (LIDODERM) 5 % Starting Sat 11/18/2017, Historical Med      ramipril (ALTACE) 2 5 mg capsule Take 1 capsule (2 5 mg total) by mouth daily, Starting Mon 2/5/2018, Normal      tamsulosin (FLOMAX) 0 4 mg Take 0 4 mg by mouth daily with dinner  , Starting Sun 10/12/2014, Historical Med      traMADol (ULTRAM) 50 mg tablet Take 1 tablet (50 mg total) by mouth every 6 (six) hours as needed for moderate pain, Starting Fri 11/30/2018, Normal           No discharge procedures on file      ED Provider  Electronically Signed by           Sanjana Presley MD  12/03/18 8420

## 2018-11-29 NOTE — TELEPHONE ENCOUNTER
S/w pt, stated that he had an episode of chest pain, weakness, SOB and high HR of about 120 for about 5-7 minutes  Pt states that he is not experiencing any symptoms now but will go to the hospital to get checked out  Dr Aylin Gentile made aware

## 2018-11-30 ENCOUNTER — APPOINTMENT (OUTPATIENT)
Dept: NUCLEAR MEDICINE | Facility: HOSPITAL | Age: 68
End: 2018-11-30
Payer: COMMERCIAL

## 2018-11-30 ENCOUNTER — APPOINTMENT (OUTPATIENT)
Dept: NON INVASIVE DIAGNOSTICS | Facility: HOSPITAL | Age: 68
End: 2018-11-30
Payer: COMMERCIAL

## 2018-11-30 VITALS
BODY MASS INDEX: 22.71 KG/M2 | DIASTOLIC BLOOD PRESSURE: 70 MMHG | SYSTOLIC BLOOD PRESSURE: 107 MMHG | WEIGHT: 186.51 LBS | TEMPERATURE: 97.7 F | HEIGHT: 76 IN | HEART RATE: 58 BPM | RESPIRATION RATE: 18 BRPM | OXYGEN SATURATION: 100 %

## 2018-11-30 DIAGNOSIS — G89.29 CHRONIC LOW BACK PAIN WITHOUT SCIATICA, UNSPECIFIED BACK PAIN LATERALITY: Primary | ICD-10-CM

## 2018-11-30 DIAGNOSIS — M54.50 CHRONIC LOW BACK PAIN WITHOUT SCIATICA, UNSPECIFIED BACK PAIN LATERALITY: Primary | ICD-10-CM

## 2018-11-30 LAB
ALBUMIN SERPL BCP-MCNC: 3.6 G/DL (ref 3.5–5)
ALP SERPL-CCNC: 84 U/L (ref 46–116)
ALT SERPL W P-5'-P-CCNC: 38 U/L (ref 12–78)
ANION GAP SERPL CALCULATED.3IONS-SCNC: 10 MMOL/L (ref 4–13)
AST SERPL W P-5'-P-CCNC: 23 U/L (ref 5–45)
ATRIAL RATE: 72 BPM
BASOPHILS # BLD AUTO: 0.02 THOUSANDS/ΜL (ref 0–0.1)
BASOPHILS NFR BLD AUTO: 1 % (ref 0–1)
BILIRUB SERPL-MCNC: 0.7 MG/DL (ref 0.2–1)
BUN SERPL-MCNC: 16 MG/DL (ref 5–25)
CALCIUM SERPL-MCNC: 9.2 MG/DL (ref 8.3–10.1)
CHLORIDE SERPL-SCNC: 107 MMOL/L (ref 100–108)
CO2 SERPL-SCNC: 26 MMOL/L (ref 21–32)
CREAT SERPL-MCNC: 0.95 MG/DL (ref 0.6–1.3)
EOSINOPHIL # BLD AUTO: 0.12 THOUSAND/ΜL (ref 0–0.61)
EOSINOPHIL NFR BLD AUTO: 3 % (ref 0–6)
ERYTHROCYTE [DISTWIDTH] IN BLOOD BY AUTOMATED COUNT: 11.8 % (ref 11.6–15.1)
GFR SERPL CREATININE-BSD FRML MDRD: 83 ML/MIN/1.73SQ M
GLUCOSE P FAST SERPL-MCNC: 87 MG/DL (ref 65–99)
GLUCOSE SERPL-MCNC: 87 MG/DL (ref 65–140)
HCT VFR BLD AUTO: 44.5 % (ref 36.5–49.3)
HGB BLD-MCNC: 15.5 G/DL (ref 12–17)
IMM GRANULOCYTES # BLD AUTO: 0.02 THOUSAND/UL (ref 0–0.2)
IMM GRANULOCYTES NFR BLD AUTO: 1 % (ref 0–2)
LYMPHOCYTES # BLD AUTO: 1.13 THOUSANDS/ΜL (ref 0.6–4.47)
LYMPHOCYTES NFR BLD AUTO: 26 % (ref 14–44)
MAX DIASTOLIC BP: 80 MMHG
MAX HEART RATE: 115 BPM
MAX PREDICTED HEART RATE: 154 BPM
MAX. SYSTOLIC BP: 146 MMHG
MCH RBC QN AUTO: 33.7 PG (ref 26.8–34.3)
MCHC RBC AUTO-ENTMCNC: 34.8 G/DL (ref 31.4–37.4)
MCV RBC AUTO: 97 FL (ref 82–98)
MONOCYTES # BLD AUTO: 0.39 THOUSAND/ΜL (ref 0.17–1.22)
MONOCYTES NFR BLD AUTO: 9 % (ref 4–12)
NEUTROPHILS # BLD AUTO: 2.66 THOUSANDS/ΜL (ref 1.85–7.62)
NEUTS SEG NFR BLD AUTO: 60 % (ref 43–75)
NRBC BLD AUTO-RTO: 0 /100 WBCS
P AXIS: 72 DEGREES
PLATELET # BLD AUTO: 163 THOUSANDS/UL (ref 149–390)
PMV BLD AUTO: 10.8 FL (ref 8.9–12.7)
POTASSIUM SERPL-SCNC: 4.1 MMOL/L (ref 3.5–5.3)
PR INTERVAL: 246 MS
PROT SERPL-MCNC: 6.5 G/DL (ref 6.4–8.2)
PROTOCOL NAME: NORMAL
QRS AXIS: 81 DEGREES
QRSD INTERVAL: 86 MS
QT INTERVAL: 372 MS
QTC INTERVAL: 407 MS
RBC # BLD AUTO: 4.6 MILLION/UL (ref 3.88–5.62)
REASON FOR TERMINATION: NORMAL
SODIUM SERPL-SCNC: 143 MMOL/L (ref 136–145)
T WAVE AXIS: 74 DEGREES
TARGET HR FORMULA: NORMAL
TEST INDICATION: NORMAL
TIME IN EXERCISE PHASE: NORMAL
TROPONIN I SERPL-MCNC: <0.02 NG/ML
VENTRICULAR RATE: 72 BPM
WBC # BLD AUTO: 4.34 THOUSAND/UL (ref 4.31–10.16)

## 2018-11-30 PROCEDURE — 93016 CV STRESS TEST SUPVJ ONLY: CPT | Performed by: INTERNAL MEDICINE

## 2018-11-30 PROCEDURE — 93018 CV STRESS TEST I&R ONLY: CPT | Performed by: INTERNAL MEDICINE

## 2018-11-30 PROCEDURE — 85025 COMPLETE CBC W/AUTO DIFF WBC: CPT | Performed by: FAMILY MEDICINE

## 2018-11-30 PROCEDURE — 78452 HT MUSCLE IMAGE SPECT MULT: CPT | Performed by: INTERNAL MEDICINE

## 2018-11-30 PROCEDURE — 93010 ELECTROCARDIOGRAM REPORT: CPT | Performed by: INTERNAL MEDICINE

## 2018-11-30 PROCEDURE — 84484 ASSAY OF TROPONIN QUANT: CPT | Performed by: FAMILY MEDICINE

## 2018-11-30 PROCEDURE — 80053 COMPREHEN METABOLIC PANEL: CPT | Performed by: FAMILY MEDICINE

## 2018-11-30 PROCEDURE — A9502 TC99M TETROFOSMIN: HCPCS

## 2018-11-30 PROCEDURE — 99243 OFF/OP CNSLTJ NEW/EST LOW 30: CPT | Performed by: INTERNAL MEDICINE

## 2018-11-30 PROCEDURE — 93017 CV STRESS TEST TRACING ONLY: CPT

## 2018-11-30 PROCEDURE — 78452 HT MUSCLE IMAGE SPECT MULT: CPT

## 2018-11-30 PROCEDURE — 99217 PR OBSERVATION CARE DISCHARGE MANAGEMENT: CPT | Performed by: FAMILY MEDICINE

## 2018-11-30 RX ORDER — TRAMADOL HYDROCHLORIDE 50 MG/1
50 TABLET ORAL EVERY 6 HOURS PRN
Status: DISCONTINUED | OUTPATIENT
Start: 2018-11-30 | End: 2018-11-30 | Stop reason: HOSPADM

## 2018-11-30 RX ORDER — TRAMADOL HYDROCHLORIDE 50 MG/1
50 TABLET ORAL EVERY 6 HOURS PRN
Qty: 30 TABLET | Refills: 0 | Status: SHIPPED | OUTPATIENT
Start: 2018-11-30 | End: 2020-06-15

## 2018-11-30 RX ADMIN — LISINOPRIL 5 MG: 5 TABLET ORAL at 08:43

## 2018-11-30 RX ADMIN — METOPROLOL SUCCINATE 75 MG: 50 TABLET, EXTENDED RELEASE ORAL at 12:58

## 2018-11-30 RX ADMIN — APIXABAN 5 MG: 5 TABLET, FILM COATED ORAL at 08:43

## 2018-11-30 RX ADMIN — REGADENOSON 0.4 MG: 0.08 INJECTION, SOLUTION INTRAVENOUS at 11:31

## 2018-11-30 NOTE — UTILIZATION REVIEW
[address]  Utilization Review Department  Phone: [phone]; Fax [phone]  [email or web address]  ATTENTION: Please call with any questions or concerns to [phone]  and carefully listen to the prompts so that you are directed to the right person  Send all requests for admission clinical reviews, approved or denied determinations and any other requests to fax [phone]  All voicemails are confidential     Initial Clinical Review    Admission: Date/Time/Statement: OBSERVATION 11/2918 @1749    Orders Placed This Encounter   Procedures    Place in Observation (expected length of stay for this patient is less than two midnights)     Standing Status:   Standing     Number of Occurrences:   1     Order Specific Question:   Admitting Physician     Answer:   Jed Borja [[id number]]     Order Specific Question:   Level of Care     Answer:   Med Surg [[id number]]       ED Arrival Information     Expected Arrival Acuity Means of Arrival Escorted By Service Admission Type    - 11/29/2018 15:53 Urgent Walk-In Spouse Hospitalist Urgent    Arrival Complaint    palpitations        Chief Complaint   Patient presents with    Palpitations     Pt diagnosed with a fib/a flutter, has an appt with Dr Moe next week  Pt states he was having palpitations this morning and heart was racing  Pt currently on blood thinners       History of Illness: 80 yo m to ED from home c/o incr palpitations for the last 24 hrs with sensation that heart is getting out of his chest had holter last week  Palpitations last 10 minutes accompanied by chest pain and sob       ED Vital Signs:   ED Triage Vitals   Temperature Pulse Respirations Blood Pressure SpO2   11/29/18 1601 11/29/18 1600 11/29/18 1600 11/29/18 1600 11/29/18 1600   98 4 °F (36 9 °C) 75 16 130/80 99 %      Temp Source Heart Rate Source Patient Position - Orthostatic VS BP Location FiO2 (%)   11/29/18 1600 11/29/18 1600 11/29/18 1600 11/29/18 1600 --   Oral Monitor Sitting Left arm       Pain Score       11/29/18 1600       No Pain        Wt Readings from Last 1 Encounters:   11/29/18 84 6 kg (186 lb 8 2 oz)     Abnormal Labs/Diagnostic Test Results:   Labs wnl, trop wnl  CXR: nothing acute  nuc stress: normal  EKG: nsr    ED Treatment: workup    Past Medical/Surgical History: Active Ambulatory Problems     Diagnosis Date Noted    Cardiomyopathy [facility]) 05/20/2014    Mixed hyperlipidemia 02/24/2014    Essential hypertension 01/28/2014    Chronic bilateral low back pain without sciatica 04/24/2018    New onset a-fib [facility]) 11/21/2018     Resolved Ambulatory Problems     Diagnosis Date Noted    No Resolved Ambulatory Problems     Past Medical History:   Diagnosis Date    Atrial fibrillation (HCC)     Benign hypertension     Blood coagulation disorder (HCC)     Hypertriglyceridemia     Lipid metabolism disorder     Neurogenic bladder     Other postprocedural cardiac functional disturbances following cardiac surgery        Admitting Diagnosis: Palpitations [R00 2]  Chest pain [R07 9]    Age/Sex: 79 y o  male    Assessment/Plan: 78 yo m to ED from home admitted due to atrial flutter  Consult cardiology, continue beta blocker, eliquis, tele  Admission Orders:  Scheduled Meds:   Current Facility-Administered Medications:  apixaban 5 mg Oral BID   lisinopril 5 mg Oral Daily   metoprolol succinate 75 mg Oral Daily   ondansetron 4 mg Intravenous Q6H PRN   pravastatin 20 mg Oral Daily With Dinner   sodium chloride (PF) 3 mL Intravenous PRN   tamsulosin 0 4 mg Oral Daily With Dinner   traMADol 50 mg Oral Q6H PRN     Tele  oob as [place]  scd's  Diabetic diet  Cons cardio  nuc stress    Per cardio: chest pain unknown etio, needs nuc stress, holter showed a fib/flutter, multiple pvc's, few couplets, bigeminy, trigeminy, and periods of geraldo at night with rapid a fib during the day   Continue eliquis, toprol, consider switch to sotalol if sx persist

## 2018-11-30 NOTE — PLAN OF CARE
Problem: PAIN - ADULT  Goal: Verbalizes/displays adequate comfort level or baseline comfort level  Interventions:  - Encourage patient to monitor pain and request assistance  - Assess pain using appropriate pain scale  - Administer analgesics based on type and severity of pain and evaluate response  - Implement non-pharmacological measures as appropriate and evaluate response  - Consider cultural and social influences on pain and pain management  - Notify physician/advanced practitioner if interventions unsuccessful or patient reports new pain   Outcome: Progressing      Problem: SAFETY ADULT  Goal: Patient will remain free of falls  INTERVENTIONS:  - Assess patient frequently for physical needs  -  Identify cognitive and physical deficits and behaviors that affect risk of falls    -  [place] fall precautions as indicated by assessment   - Educate patient/family on patient safety including physical limitations  - Instruct patient to call for assistance with activity based on assessment  - Modify environment to reduce risk of injury  - Consider OT/PT consult to assist with strengthening/mobility    Outcome: Progressing      Problem: DISCHARGE PLANNING  Goal: Discharge to home or other facility with appropriate resources  INTERVENTIONS:  - Identify barriers to discharge w/patient and caregiver  - Arrange for needed discharge resources and transportation as appropriate  - Identify discharge learning needs (meds, wound care, etc )  - Arrange for interpretive services to assist at discharge as needed  - Refer to Case Management Department for coordinating discharge planning if the patient needs post-hospital services based on physician/advanced practitioner order or complex needs related to functional status, cognitive ability, or social support system   Outcome: Progressing      Problem: Knowledge Deficit  Goal: Patient/family/caregiver demonstrates understanding of disease process, treatment plan, medications, and discharge instructions  Complete learning assessment and assess knowledge base  Interventions:  - Provide teaching at level of understanding  - Provide teaching via preferred learning methods   Outcome: Progressing      Problem: CARDIOVASCULAR - ADULT  Goal: Maintains optimal cardiac output and hemodynamic stability  INTERVENTIONS:  - Monitor I/O, vital signs and rhythm  - Monitor for S/S and trends of decreased cardiac output i e  bleeding, hypotension  - Administer and titrate ordered vasoactive medications to optimize hemodynamic stability  - Assess quality of pulses, skin color and temperature  - Assess for signs of decreased coronary artery perfusion - ex   Angina  - Instruct patient to report change in severity of symptoms   Outcome: Progressing    Goal: Absence of cardiac dysrhythmias or at baseline rhythm  INTERVENTIONS:  - Continuous cardiac monitoring, monitor vital signs, obtain 12 lead EKG if indicated  - Administer antiarrhythmic and heart rate control medications as ordered  - Monitor electrolytes and administer replacement therapy as ordered   Outcome: Progressing      Problem: RESPIRATORY - ADULT  Goal: Achieves optimal ventilation and oxygenation  INTERVENTIONS:  - Assess for changes in respiratory status  - Assess for changes in mentation and behavior  - Position to facilitate oxygenation and minimize respiratory effort  - Oxygen administration by appropriate delivery method based on oxygen saturation (per order) or ABGs  - Initiate smoking cessation education as indicated  - Encourage broncho-pulmonary hygiene including cough, deep breathe, Incentive Spirometry  - Assess the need for suctioning and aspirate as needed  - Assess and instruct to report SOB or any respiratory difficulty  - Respiratory Therapy support as indicated   Outcome: Progressing      Problem: HEMATOLOGIC - ADULT  Goal: Maintains hematologic stability  INTERVENTIONS  - Assess for signs and symptoms of bleeding or hemorrhage  - Monitor labs  - Administer supportive blood products/factors as ordered and appropriate   Outcome: Progressing      Problem: Potential for Falls  Goal: Patient will remain free of falls  INTERVENTIONS:  - Assess patient frequently for physical needs  -  Identify cognitive and physical deficits and behaviors that affect risk of falls    -  [place] fall precautions as indicated by assessment   - Educate patient/family on patient safety including physical limitations  - Instruct patient to call for assistance with activity based on assessment  - Modify environment to reduce risk of injury  - Consider OT/PT consult to assist with strengthening/mobility    Outcome: Progressing

## 2018-11-30 NOTE — DISCHARGE SUMMARY
Discharge Summary - [facility] Internal Medicine    Patient Information: Sisi Manriquez 79 y o  male MRN: [id number]  Unit/Bed#: -01 Encounter: [id number]    Discharging Physician / Practitioner: Justin Zavala MD  PCP: Félix Mayberry DO  Admission Date: 11/29/2018  Discharge Date: 11/30/18    Disposition:     Home    Discharge Diagnoses:     Principal Problem:    Atrial flutter ([address] )  Active Problems:    Mixed hyperlipidemia    Essential hypertension  Resolved Problems:    * No resolved hospital problems  *      Consultations During Hospital Stay:  · Cardiology    Procedures Performed:     · Nuclear stress test    Significant Findings / Test Results:     ·   Incidental Findings:     Test Results Pending at Discharge (will require follow up): Complications:    Hospital Course:     Sisi Manriquez is a 79 y o  male patient who originally presented to the hospital on 11/29/2018 due to atrial flutter with previous complaining of palpitation  Patient was placed on telemetric evaluated by Cardiology Department which recommend nuclear stress test without any positive finding  By Cardiology recommendation patient is stable to be discharged with closely follow up as an outpatient  Condition at Discharge: stable     Discharge Day Visit / Exam:     Subjective:  Patient denies any chest pain short of breath palpitation  Vitals: Blood Pressure: 107/70 (11/30/18 1256)  Pulse: 58 (11/30/18 1256)  Temperature: 97 7 °F (36 5 °C) (11/30/18 1256)  Temp Source: Oral (11/30/18 1256)  Respirations: 18 (11/30/18 1256)  Height: 6' 3 5" (191 8 cm) (11/29/18 1916)  Weight - Scale: 84 6 kg (186 lb 8 2 oz) (11/29/18 1916)  SpO2: 100 % (11/30/18 1256)  Exam:   Physical Exam   Constitutional: He is oriented to person, place, and time  No distress  HENT:   Head: Normocephalic and atraumatic  Right Ear: External ear normal    Left Ear: External ear normal    Neck: No JVD present  No tracheal deviation present   No thyromegaly present  Cardiovascular: Normal rate, regular rhythm, normal heart sounds and intact distal pulses  Exam reveals no gallop  No murmur heard  Pulmonary/Chest: Effort normal  No respiratory distress  He has no wheezes  He has no rales  He exhibits no tenderness  Abdominal: Soft  Bowel sounds are normal  He exhibits no distension and no mass  There is no tenderness  There is no rebound and no guarding  Musculoskeletal: He exhibits no edema, tenderness or deformity  Lymphadenopathy:     He has no cervical adenopathy  Neurological: He is alert and oriented to person, place, and time  He has normal reflexes  No cranial nerve deficit  Coordination normal    Skin: Skin is warm  He is not diaphoretic  Psychiatric: He has a normal mood and affect  Discussion with Family:     Discharge instructions/Information to patient and family:   See after visit summary for information provided to patient and family  Provisions for Follow-Up Care:  See after visit summary for information related to follow-up care and any pertinent home health orders  Planned Readmission:      Discharge Statement:  I spent 30 minutes discharging the patient  This time was spent on the day of discharge  I had direct contact with the patient on the day of discharge  Greater than 50% of the total time was spent examining patient, answering all patient questions, arranging and discussing plan of care with patient as well as directly providing post-discharge instructions  Additional time then spent on discharge activities  Discharge Medications:  See after visit summary for reconciled discharge medications provided to patient and family        ** Please Note: This note has been constructed using a voice recognition system **

## 2018-11-30 NOTE — CONSULTS
Consultation - Cardiology  Michael Shah 79 y o  male MRN: [id number]  Unit/Bed#: -01 Encounter: [id number]    Inpatient consult to Cardiology  Consult performed by: Monique Palacios ordered by: [facility]          Physician Requesting Consult: Ethyl Kayser,*  Reason for Consult / Principal Problem:  Palpitations, chest pain    Chief Complaint   Patient presents with    Palpitations     Pt diagnosed with a fib/a flutter, has an appt with Dr Moe next week  Pt states he was having palpitations this morning and heart was racing  Pt currently on blood thinners       HPI: Cardiologist Dr Marlys Knutson is a 79y o  year old male who has a history of atrial fibrillation, hypertension, hyperlipidemia  Presents with 5-7 minutes of palpitations and chest heaviness since yesterday  Denies SOB, HA, blurred vision, syncope, pain or swelling in the extremities  Patient recently received outpatient echocardiogram to which he was found to be in atrial fibrillation  Patient had been having episodes chest pain, shortness of breath, palpitations especially when getting in and out of the shower  Patient was re-evaluated on 11/21/2018, given toprol and eliquis (CHADS2 VASC -2) and was sent home with Holter monitor       REVIEW OF SYSTEMS:  Constitutional:  Denies fever or chills   Eyes:  Denies change in visual acuity   HENT:  Denies nasal congestion or sore throat   Respiratory:  Denies cough or shortness of breath   Cardiovascular: + palpitations, + chest pressure  GI:  Denies abdominal pain, nausea, vomiting, bloody stools or diarrhea   :  Denies dysuria, frequency, difficulty in micturition and nocturia  Musculoskeletal:  Denies back pain or joint pain   Neurologic:  Denies headache, focal weakness or sensory changes   Endocrine:  Denies polyuria or polydipsia   Lymphatic:  Denies swollen glands   Psychiatric:  Denies depression or anxiety     Historical Information   Past Medical History:   Diagnosis Date    Atrial fibrillation ([address] )     Benign hypertension     Blood coagulation disorder (HCC)     Hypertriglyceridemia     Lipid metabolism disorder     Neurogenic bladder     Other postprocedural cardiac functional disturbances following cardiac surgery      Past Surgical History:   Procedure Laterality Date    KNEE SURGERY Bilateral     LIPOMA RESECTION       History   Alcohol Use    Yes     History   Drug Use No     History   Smoking Status    Former Smoker   Smokeless Tobacco    Never Used     Family History:   Family History   Problem Relation Age of Onset    Diabetes Mother     Colon cancer Mother        MEDS & ALLERGIES:  current meds:   Current Facility-Administered Medications   Medication Dose Route Frequency    apixaban (ELIQUIS) tablet 5 mg  5 mg Oral BID    lisinopril (ZESTRIL) tablet 5 mg  5 mg Oral Daily    metoprolol succinate (TOPROL-XL) 24 hr tablet 75 mg  75 mg Oral Daily    ondansetron (ZOFRAN) injection 4 mg  4 mg Intravenous Q6H PRN    pravastatin (PRAVACHOL) tablet 20 mg  20 mg Oral Daily With Dinner    sodium chloride (PF) 0 9 % injection 3 mL  3 mL Intravenous PRN    tamsulosin (FLOMAX) capsule 0 4 mg  0 4 mg Oral Daily With Dinner        No Known Allergies    OBJECTIVE:  Vitals:   Vitals:    11/30/18 0700   BP: 127/77   Pulse: 74   Resp: 18   Temp: 97 7 °F (36 5 °C)   SpO2: 98%     Body mass index is 23 kg/m²      Systolic (68XPF), VVW:890 , Min:104 , RCU:200     Diastolic (58UTE), VLJ:77, Min:62, Max:93      Intake/Output Summary (Last 24 hours) at 11/30/18 0852  Last data filed at 11/30/18 0407   Gross per 24 hour   Intake              180 ml   Output             1150 ml   Net             -970 ml     Weight (last 2 days)     Date/Time   Weight    11/29/18 1916  84 6 (186 51)    11/29/18 1600  84 6 (186 51)            Invasive Devices     Peripheral Intravenous Line            Peripheral IV 11/29/18 Left Antecubital less than 1 day                PHYSICAL EXAMS:  General:  Patient is not in acute distress, laying in the bed comfortably, awake, alert responding to commands  Head: Normocephalic, Atraumatic  HEENT:  Both pupils normal-size atraumatic, normocephalic, nonicteric  Neck:  JVP not raised  Trachea central  Respiratory:  Bronchovascular breathing all over the chest without any accompaniment  Cardiovascular:  S1-S2 normal without any murmur rails or rub  GI:  Abdomen soft nontender   Liver and spleen normal size  Musculoskeletal:  No edema  Integument:  No skin rashes or ulceration  Lymphatic:  No cervical or inguinal lymphadenopathy  Neurologic:  Patient is awake alert, responding to command, well-oriented to time and place and person    LABORATORY RESULTS:    Results from last 7 days  Lab Units 11/29/18  1719   TROPONIN I ng/mL <0 02     CBC with diff:   Results from last 7 days  Lab Units 11/30/18  0534 11/29/18  1719   WBC Thousand/uL 4 34 5 01   HEMOGLOBIN g/dL 15 5 16 0   HEMATOCRIT % 44 5 46 6   MCV fL 97 98   PLATELETS Thousands/uL 163 183   MCH pg 33 7 33 5   MCHC g/dL 34 8 34 3   RDW % 11 8 11 8   MPV fL 10 8 10 9   NRBC AUTO /100 WBCs 0 0       CMP:  Results from last 7 days  Lab Units 11/30/18  0534 11/29/18  1719   POTASSIUM mmol/L 4 1 3 9   CHLORIDE mmol/L 107 103   CO2 mmol/L 26 28   BUN mg/dL 16 18   CREATININE mg/dL 0 95 1 03   CALCIUM mg/dL 9 2 9 5   AST U/L 23  --    ALT U/L 38  --    ALK PHOS U/L 84  --    EGFR ml/min/1 73sq m 83 75       BMP:  Results from last 7 days  Lab Units 11/30/18  0534 11/29/18  1719   POTASSIUM mmol/L 4 1 3 9   CHLORIDE mmol/L 107 103   CO2 mmol/L 26 28   BUN mg/dL 16 18   CREATININE mg/dL 0 95 1 03   CALCIUM mg/dL 9 2 9 5              Results from last 7 days  Lab Units 11/29/18  1719   MAGNESIUM mg/dL 2 2                   Lipid Profile:   No results found for: CHOL  No results found for: HDL  No results found for: LDLCALC  No results found for: TRIG    Cardiac testing: Results for orders placed during the hospital encounter of 18   Echo complete with contrast if indicated    Narrative [address], [facility]  [phone]    Transthoracic Echocardiogram  2D, M-mode, and Color Doppler    Study date:  2018    Patient: Bettie Paris  MR number: [id number]  Account number: [de-identified]  : 1950  Age: 79 years  Gender: Male  Status: Outpatient  Location: [facility]  Height: 75 in  Weight: 185 lb  BP: 104/ 74 mmHg    Indications: Thoracic aortic aneurysm  Diagnoses: I71 2 - Thoracic aortic aneurysm, without rupture    Sonographer:  Severino RCS  Interpreting Physician:  Amber Rae MD  Primary Physician:  Jose G Madera DO  Referring Physician:  Amber Rae MD  Group:  [facility]    SUMMARY    LEFT VENTRICLE:  Ejection fraction was estimated to be 50 %  Although no diagnostic regional wall motion abnormality was identified, this possibility cannot be completely excluded on the basis of this study  MITRAL VALVE:  There was mild regurgitation  TRICUSPID VALVE:  There was trace regurgitation  AORTA:  The root exhibited borderline dilatation  HISTORY: PRIOR HISTORY: Medication-treated hyperlipidemia  Cardiomyopathy  Risk factors: hypertension  PROCEDURE: The study was performed in the [address] [place] [address]  This was a routine study  The transthoracic approach was used  The study included complete 2D imaging, M-mode, and color Doppler  The heart rate was 87 bpm, at the  start of the study  Images were obtained from the parasternal, apical, subcostal, and suprasternal notch acoustic windows  Image quality was adequate  LEFT VENTRICLE: Size was normal  Ejection fraction was estimated to be 50 %   Although no diagnostic regional wall motion abnormality was identified, this possibility cannot be completely excluded on the basis of this study  DOPPLER: Left  ventricular diastolic function parameters were normal     RIGHT VENTRICLE: The size was normal  Systolic function was normal  Wall thickness was normal     LEFT ATRIUM: Size was normal     RIGHT ATRIUM: Size was normal     MITRAL VALVE: There was mild thickening  DOPPLER: There was mild regurgitation  AORTIC VALVE: The valve was not well visualized  DOPPLER: There was no evidence for stenosis  TRICUSPID VALVE: The valve structure was normal  There was normal leaflet separation  DOPPLER: The transtricuspid velocity was within the normal range  There was no evidence for stenosis  There was trace regurgitation  PULMONIC VALVE: Leaflets exhibited normal thickness, no calcification, and normal cuspal separation  DOPPLER: The transpulmonic velocity was within the normal range  There was no regurgitation  PERICARDIUM: There was no pericardial effusion  The pericardium was normal in appearance  AORTA: The root exhibited borderline dilatation  SYSTEM MEASUREMENT TABLES    Apical four chamber  4 chamber Left Atrium Volume Index; Planimetry; End Systole; Apical four chamber;: 14 07 cm2  Left Ventricular Ejection Fraction; Method of Disks, Single Plane; Apical four chamber;: 52 5 %  Left Ventricular End Diastolic Volume; Method of Disks, Single Plane; Apical four chamber;: 117 6 ml  Left Ventricular End Systolic Volume; Method of Disks, Single Plane; Apical four chamber;: 55 9 ml  Right Atrium Systolic Area; Planimetry; End Systole; Apical four chamber;: 15 92 cm2  Right Ventricular Internal Diastolic Dimension; End Diastole; Apical four chamber;: 39 1 mm  TAPSE: 17 3 mm    Unspecified Scan Mode  Aortic Root Diameter; End Systole;: 37 5 mm  Left Atrium to Aortic Root Ratio;: 0 87  Left atrial diameter; End Diastole;: 32 7 mm  Cardiac Output; Teichholz; Systole;: 3 9 L/min  Heart rate; Teichholz;: 91 /min  Interventricular Septum Diastolic Thickness;  Teichholz; End Diastole;: 9 7 mm  Left Ventricle Internal End Diastolic Dimension; Teichholz;: 40 5 mm  Left Ventricle Internal Systolic Dimension; Teichholz; End Systole;: 27 9 mm  Left Ventricle Mass; Mass AVCube with Teichholz; End Diastole;: 119 g  Left Ventricle Posterior Wall Diastolic Thickness; Teichholz; End Diastole;: 9 1 mm  Left Ventricular Ejection Fraction; Teichholz;: 59 4 %  Left Ventricular End Diastolic Volume; Teichholz;: 72 1 ml  Left Ventricular End Systolic Volume; Teichholz;: 29 3 ml  Left Ventricular Fractional Shortening;: 31 1 %  Stroke volume; Cleve Keatinger;: 42 8 ml    [address] Accredited Echocardiography Laboratory    Prepared and electronically signed by    Martin Urbina MD  Signed 21-Nov-2018 15:20:09         EKG reviewed personally:   Not available to me at this time    Telemetry reviewed personally:   Normal sinus rhythm with PVCs    Assessment/Plan:  Active Problems:    Mixed hyperlipidemia    Essential hypertension    Atrial flutter ([address] )    1- Chest Pain, unknown etiology   Troponin negative x1  No dynamic EKG changes  On telemetry  Previous echo from 11/21/2018 shows EF of 50% mild MR, Trace TR  Patient to receive chemical stress test  Further recommendations will be based upon test results    2- Paroxysmal atrial flutter, rate controlled  Now normal sinus rhythm  24 hour Holter monitor shows rhythm predominantly in atrial fibrillation/flutter, average heart rate 86bpm, multiple PVCs, few ventricular couplets as well as periods of bigeminy and trigeminy, had periods of bradycardia at night with periods of atrial fibrillation with rapid ventricular response during the day  Continue Eliquis, Toprol  Consider switch to sotalol if symptoms continue to persist, will hold off for now     Will re-evaluate need for Eliquis during next outpatient visit  Avoid aspirin, NSAIDs     3- Essential hypertension  Stable now 127/77  On lisinopril    4- Mixed hyperlipidemia  On pravastatin    Code Status: Level 1 - Full Code    Thank you for allowing us to participate in this patient's care  This pt will follow up with Dr Abbi King once discharged  Counseling / Coordination of Care  Total floor / unit time spent today 35 minutes  Greater than 50% of total time was spent with the patient and / or family counseling and / or coordination of care  A description of the counseling / coordination of care: Review of history, current assessment, development of a plan  Monserrat Camacho PA-C  11/30/2018,8:52 AM    Portions of the record may have been created with voice recognition software   Occasional wrong word or "sound a like" substitutions may have occurred due to the inherent limitations of voice recognition software   Read the chart carefully and recognize, using context, where substitutions have occurred

## 2018-11-30 NOTE — SOCIAL WORK
CM met with pt at bedside  Pt alert  CM reviewed observation status with pt  Pt signed obs notice  Obs placed in medical records

## 2018-11-30 NOTE — PLAN OF CARE
CARDIOVASCULAR - ADULT     Maintains optimal cardiac output and hemodynamic stability Progressing     Absence of cardiac dysrhythmias or at baseline rhythm Progressing        DISCHARGE PLANNING     Discharge to home or other facility with appropriate resources Progressing        HEMATOLOGIC - ADULT     Maintains hematologic stability Progressing        Knowledge Deficit     Patient/family/caregiver demonstrates understanding of disease process, treatment plan, medications, and discharge instructions Progressing        PAIN - ADULT     Verbalizes/displays adequate comfort level or baseline comfort level Progressing        RESPIRATORY - ADULT     Achieves optimal ventilation and oxygenation Progressing        SAFETY ADULT     Patient will remain free of falls Progressing

## 2018-12-12 ENCOUNTER — OFFICE VISIT (OUTPATIENT)
Dept: CARDIOLOGY CLINIC | Facility: CLINIC | Age: 68
End: 2018-12-12
Payer: COMMERCIAL

## 2018-12-12 VITALS
HEIGHT: 76 IN | DIASTOLIC BLOOD PRESSURE: 80 MMHG | SYSTOLIC BLOOD PRESSURE: 108 MMHG | BODY MASS INDEX: 22.82 KG/M2 | WEIGHT: 187.4 LBS | OXYGEN SATURATION: 98 % | HEART RATE: 68 BPM

## 2018-12-12 DIAGNOSIS — E78.2 MIXED HYPERLIPIDEMIA: Primary | ICD-10-CM

## 2018-12-12 DIAGNOSIS — I48.91 NEW ONSET A-FIB (HCC): ICD-10-CM

## 2018-12-12 DIAGNOSIS — I10 ESSENTIAL HYPERTENSION: ICD-10-CM

## 2018-12-12 DIAGNOSIS — I42.9 CARDIOMYOPATHY, UNSPECIFIED TYPE (HCC): ICD-10-CM

## 2018-12-12 DIAGNOSIS — I10 HYPERTENSION, ESSENTIAL: ICD-10-CM

## 2018-12-12 PROCEDURE — 99214 OFFICE O/P EST MOD 30 MIN: CPT | Performed by: INTERNAL MEDICINE

## 2018-12-12 PROCEDURE — 1111F DSCHRG MED/CURRENT MED MERGE: CPT | Performed by: INTERNAL MEDICINE

## 2018-12-12 RX ORDER — METOPROLOL SUCCINATE 50 MG/1
TABLET, EXTENDED RELEASE ORAL
Qty: 90 TABLET | Refills: 0
Start: 2018-12-12 | End: 2019-09-16 | Stop reason: SDUPTHER

## 2018-12-12 NOTE — PROGRESS NOTES
[facility]  [address] [place] [address] [address] [address]  Cardiology Follow Up    Garret Azevedo  1950  [id number]      1  Mixed hyperlipidemia     2  Cardiomyopathy, unspecified type ([facility] )     3  Essential hypertension     4  New onset a-fib [facility])         Chief Complaint   Patient presents with    Follow-up       Interval History:  Patient presents for follow-up visit  Patient denies any history of chest pain shortness of breath  Patient denies any history of leg edema or orthopnea PND  No history of presyncope syncope  Patient states compliance with the present list of medications  Patient is presently still maintaining sinus rhythm  Patient is on anticoagulation  Patient denies any bleeding issues  Patient is planned to have hip surgery in February of 2019       Patient Active Problem List   Diagnosis    Cardiomyopathy ([facility] )    Mixed hyperlipidemia    Essential hypertension    Chronic bilateral low back pain without sciatica    New onset a-fib ([place] [address] )    Atrial flutter ([place] [address] )     Past Medical History:   Diagnosis Date    Atrial fibrillation ([place] [address] )     Benign hypertension     Blood coagulation disorder ([place] [address] )     Hypertriglyceridemia     Lipid metabolism disorder     Neurogenic bladder     Other postprocedural cardiac functional disturbances following cardiac surgery      Social History     Social History    Marital status: /Civil Union     Spouse name: N/A    Number of children: N/A    Years of education: N/A     Occupational History          employed     Social History Main Topics    Smoking status: Former Smoker    Smokeless tobacco: Never Used    Alcohol use Yes    Drug use: No    Sexual activity: Not on file     Other Topics Concern    Not on file     Social History Narrative    Always uses a seatbelt    Daily caffeine consumption, 2-3 servings per day    Seeing a dentist          Family History   Problem Relation Age of Onset    Diabetes Mother  Colon cancer Mother      Past Surgical History:   Procedure Laterality Date    KNEE SURGERY Bilateral     LIPOMA RESECTION         Current Outpatient Prescriptions:     apixaban (ELIQUIS) 5 mg, Take 1 tablet (5 mg total) by mouth 2 (two) times a day, Disp: 60 tablet, Rfl: 0    lidocaine (LIDODERM) 5 %, , Disp: , Rfl: 0    metoprolol succinate (TOPROL-XL) 50 mg 24 hr tablet, TAKE 1 AND 1/2 TABLETS DAILY   (Patient taking differently: TAKE 1/2 TABLETS DAILY ), Disp: 135 tablet, Rfl: 3    ramipril (ALTACE) 2 5 mg capsule, Take 1 capsule (2 5 mg total) by mouth daily, Disp: 90 capsule, Rfl: 3    simvastatin (ZOCOR) 20 mg tablet, take 1 tablet by mouth at bedtime, Disp: 90 tablet, Rfl: 3    tamsulosin (FLOMAX) 0 4 mg, Take 0 4 mg by mouth daily with dinner  , Disp: , Rfl:     traMADol (ULTRAM) 50 mg tablet, Take 1 tablet (50 mg total) by mouth every 6 (six) hours as needed for moderate pain, Disp: 30 tablet, Rfl: 0  No Known Allergies    Labs:  Admission on 11/29/2018, Discharged on 11/30/2018   Component Date Value    WBC 11/29/2018 5 01     RBC 11/29/2018 4 78     Hemoglobin 11/29/2018 16 0     Hematocrit 11/29/2018 46 6     MCV 11/29/2018 98     MCH 11/29/2018 33 5     MCHC 11/29/2018 34 3     RDW 11/29/2018 11 8     MPV 11/29/2018 10 9     Platelets 91/39/9248 183     nRBC 11/29/2018 0     Neutrophils Relative 11/29/2018 69     Immat GRANS % 11/29/2018 0     Lymphocytes Relative 11/29/2018 22     Monocytes Relative 11/29/2018 8     Eosinophils Relative 11/29/2018 1     Basophils Relative 11/29/2018 0     Neutrophils Absolute 11/29/2018 3 40     Immature Grans Absolute 11/29/2018 0 02     Lymphocytes Absolute 11/29/2018 1 10     Monocytes Absolute 11/29/2018 0 41     Eosinophils Absolute 11/29/2018 0 07     Basophils Absolute 11/29/2018 0 01     Sodium 11/29/2018 140     Potassium 11/29/2018 3 9     Chloride 11/29/2018 103     CO2 11/29/2018 28     ANION GAP 11/29/2018 9     BUN 11/29/2018 18     Creatinine 11/29/2018 1 03     Glucose 11/29/2018 99     Calcium 11/29/2018 9 5     eGFR 11/29/2018 75     Troponin I 11/29/2018 <0 02     Magnesium 11/29/2018 2 2     Sodium 11/30/2018 143     Potassium 11/30/2018 4 1     Chloride 11/30/2018 107     CO2 11/30/2018 26     ANION GAP 11/30/2018 10     BUN 11/30/2018 16     Creatinine 11/30/2018 0 95     Glucose 11/30/2018 87     Glucose, Fasting 11/30/2018 87     Calcium 11/30/2018 9 2     AST 11/30/2018 23     ALT 11/30/2018 38     Alkaline Phosphatase 11/30/2018 84     Total Protein 11/30/2018 6 5     Albumin 11/30/2018 3 6     Total Bilirubin 11/30/2018 0 70     eGFR 11/30/2018 83     WBC 11/30/2018 4 34     RBC 11/30/2018 4 60     Hemoglobin 11/30/2018 15 5     Hematocrit 11/30/2018 44 5     MCV 11/30/2018 97     MCH 11/30/2018 33 7     MCHC 11/30/2018 34 8     RDW 11/30/2018 11 8     MPV 11/30/2018 10 8     Platelets 98/57/7517 163     nRBC 11/30/2018 0     Neutrophils Relative 11/30/2018 60     Immat GRANS % 11/30/2018 1     Lymphocytes Relative 11/30/2018 26     Monocytes Relative 11/30/2018 9     Eosinophils Relative 11/30/2018 3     Basophils Relative 11/30/2018 1     Neutrophils Absolute 11/30/2018 2 66     Immature Grans Absolute 11/30/2018 0 02     Lymphocytes Absolute 11/30/2018 1 13     Monocytes Absolute 11/30/2018 0 39     Eosinophils Absolute 11/30/2018 0 12     Basophils Absolute 11/30/2018 0 02     Troponin I 11/30/2018 <0 02     Protocol Name 11/30/2018 [facility] W[facility]     Time In Exercise Phase 11/30/2018 00:03:00     MAX   SYSTOLIC BP 10/92/3372 003     Max Diastolic Bp 39/24/1916 80     Max Heart Rate 11/30/2018 115     Max Predicted Heart Rate 11/30/2018 154     Reason for Termination 11/30/2018 Protocol Complete     Test Indication 11/30/2018 Palpitations     Target Hr Formular 11/30/2018 (220 - Age)*100%     Ventricular Rate 11/29/2018 72     Atrial Rate 11/29/2018 72  TN Interval 11/29/2018 246     QRSD Interval 11/29/2018 86     QT Interval 11/29/2018 372     QTC Interval 11/29/2018 407     P Axis 11/29/2018 72     QRS Axis 11/29/2018 81     T Wave Axis 11/29/2018 74    Office Visit on 11/21/2018   Component Date Value    WBC 11/21/2018 5 08     RBC 11/21/2018 4 67     Hemoglobin 11/21/2018 15 8     Hematocrit 11/21/2018 46 6     MCV 11/21/2018 100*    MCH 11/21/2018 33 8     MCHC 11/21/2018 33 9     RDW 11/21/2018 11 8     MPV 11/21/2018 11 4     Platelets 86/91/6073 188     nRBC 11/21/2018 0     Neutrophils Relative 11/21/2018 66     Immat GRANS % 11/21/2018 0     Lymphocytes Relative 11/21/2018 25     Monocytes Relative 11/21/2018 8     Eosinophils Relative 11/21/2018 1     Basophils Relative 11/21/2018 0     Neutrophils Absolute 11/21/2018 3 31     Immature Grans Absolute 11/21/2018 0 02     Lymphocytes Absolute 11/21/2018 1 26     Monocytes Absolute 11/21/2018 0 41     Eosinophils Absolute 11/21/2018 0 06     Basophils Absolute 11/21/2018 0 02     Sodium 11/21/2018 139     Potassium 11/21/2018 4 5     Chloride 11/21/2018 107     CO2 11/21/2018 30     ANION GAP 11/21/2018 2*    BUN 11/21/2018 17     Creatinine 11/21/2018 1 11     Glucose, Fasting 11/21/2018 63*    Calcium 11/21/2018 9 1     AST 11/21/2018 20     ALT 11/21/2018 32     Alkaline Phosphatase 11/21/2018 82     Total Protein 11/21/2018 6 9     Albumin 11/21/2018 4 0     Total Bilirubin 11/21/2018 0 49     eGFR 11/21/2018 69     TSH 3RD GENERATON 11/21/2018 2 380      Imaging: X-ray Chest 2 Views    Result Date: 11/30/2018  Narrative: CHEST INDICATION:   chest pain  COMPARISON:  None EXAM PERFORMED/VIEWS:  XR CHEST PA & LATERAL  The frontal view was performed utilizing dual energy radiographic technique  FINDINGS: Cardiomediastinal silhouette appears unremarkable  No airspace consolidation, pneumothorax, pulmonary edema, or pleural effusion   Mild thoracic spondylosis  Levoconvex lower thoracic scoliosis  Impression: No radiographic evidence of acute intrathoracic process  Workstation performed: [id number]       Review of Systems:  Review of Systems   REVIEW OF SYSTEMS:  Constitutional:  Denies fever or chills   Eyes:  Denies change in visual acuity   HENT:  Denies nasal congestion or sore throat   Respiratory:  Denies cough or shortness of breath   Cardiovascular:  Denies chest pain or edema   GI:  Denies abdominal pain, nausea, vomiting, bloody stools or diarrhea   :  Denies dysuria, frequency, difficulty in micturition and nocturia  Musculoskeletal:  Chronic hip issues  Neurologic:  Denies headache, focal weakness or sensory changes   Endocrine:  Denies polyuria or polydipsia   Lymphatic:  Denies swollen glands   Psychiatric:  Denies depression or anxiety     Physical Exam:    /80   Pulse 68   Ht 6' 3 5" (1 918 m)   Wt 85 kg (187 lb 6 4 oz)   SpO2 98%   BMI 23 11 kg/m²     Physical Exam   PHYSICAL EXAM:  General:  Patient is not in acute distress   Head: Normocephalic, Atraumatic  HEENT:  Both pupils normal-size atraumatic, normocephalic, nonicteric  Neck:  JVP not raised  Trachea central  No carotid bruit  Respiratory:  normal breath sounds no crackles  no rhonchi  Cardiovascular:  Regular rate and rhythm no S3 no murmurs  GI:  Abdomen soft nontender  No organomegaly  Lymphatic:  No cervical or inguinal lymphadenopathy  Neurologic:  Patient is awake alert, oriented   Grossly nonfocal    Discussion/Summary:  Patient with an episode of atrial fibrillation/flutter recently with the spontaneous conversion to sinus rhythm  Patient is on beta-blockers as well as Eliquis  Patient understands the risks and benefits of anticoagulation to prevent thromboembolic risk from atrial fibrillation  Patient will continue anticoagulation at least for the next 2 to 3 months and we will reassess depending on whether he has recurrence of atrial arrhythmia  Patient can stop Eliquis 3 days prior to hip surgery  Patient has no specific contraindication from cardiac standpoint to proceed with surgery as long as there is no interim change in his clinical status  Patient had a few questions which were answered  Patient to report any bleeding issues  Medications reviewed  Prescriptions refilled  Follow-up in a few months or earlier as needed  Follow-up with primary care physician

## 2018-12-13 ENCOUNTER — TELEPHONE (OUTPATIENT)
Dept: CARDIOLOGY CLINIC | Facility: CLINIC | Age: 68
End: 2018-12-13

## 2018-12-13 NOTE — TELEPHONE ENCOUNTER
Dr Alyssa Davis was this visit signed off on and resulted? Its showing up as it wasn't resulted  Thank you

## 2019-01-04 ENCOUNTER — TELEPHONE (OUTPATIENT)
Dept: CARDIOLOGY CLINIC | Facility: CLINIC | Age: 69
End: 2019-01-04

## 2019-01-04 NOTE — TELEPHONE ENCOUNTER
LM for with Sanchez Garcia from Dr Brandon Landers office per VP6 hold el[facility]  Called patient and let him know as well

## 2019-01-04 NOTE — TELEPHONE ENCOUNTER
PT IS HAVING A TOTAL RIGHT HIP REPLACEMENT ON 02/11/19 BY DR Edmundo Dutton  IS PT ALLOWED TO STOP ELIQUIS FOR SURGERY? PLEASE CALL JASON AND LET HER KNOW   Renetta Mariano

## 2019-01-21 ENCOUNTER — TELEPHONE (OUTPATIENT)
Dept: CARDIOLOGY CLINIC | Facility: CLINIC | Age: 69
End: 2019-01-21

## 2019-01-21 NOTE — TELEPHONE ENCOUNTER
PT HAD AN EKG AND PT WAS IN AFIB  EKG WAS DONE AT [facility]  PT IS SCHED FOR RIGHT HIP REPLACEMENT ON 02/11 BY DR Gildardo Marcos  PT SAID THE REPORT FOR THE EKG WAS GOING TO THE ANESTHESIOLOGIST  PT WANTS DR ISLAS AWARE  PT WANTS TO KNOW WHAT DR ISLAS THINKS  PT AWARE THAT DR ISLAS IS OUT OF THE OFFICE UNTIL NEXT WEEK

## 2019-01-22 ENCOUNTER — OFFICE VISIT (OUTPATIENT)
Dept: FAMILY MEDICINE CLINIC | Facility: CLINIC | Age: 69
End: 2019-01-22
Payer: COMMERCIAL

## 2019-01-22 VITALS
TEMPERATURE: 97.8 F | DIASTOLIC BLOOD PRESSURE: 64 MMHG | BODY MASS INDEX: 23.26 KG/M2 | WEIGHT: 191 LBS | SYSTOLIC BLOOD PRESSURE: 112 MMHG | HEIGHT: 76 IN | OXYGEN SATURATION: 98 % | HEART RATE: 67 BPM

## 2019-01-22 DIAGNOSIS — I48.4 ATYPICAL ATRIAL FLUTTER (HCC): ICD-10-CM

## 2019-01-22 DIAGNOSIS — Z01.818 PREOP EXAMINATION: Primary | ICD-10-CM

## 2019-01-22 DIAGNOSIS — I48.91 NEW ONSET A-FIB (HCC): ICD-10-CM

## 2019-01-22 DIAGNOSIS — I10 ESSENTIAL HYPERTENSION: ICD-10-CM

## 2019-01-22 DIAGNOSIS — M16.10 HIP ARTHRITIS: ICD-10-CM

## 2019-01-22 PROBLEM — N31.9 NEUROGENIC BLADDER: Status: ACTIVE | Noted: 2019-01-22

## 2019-01-22 PROCEDURE — 3078F DIAST BP <80 MM HG: CPT | Performed by: PHYSICIAN ASSISTANT

## 2019-01-22 PROCEDURE — 1160F RVW MEDS BY RX/DR IN RCRD: CPT | Performed by: PHYSICIAN ASSISTANT

## 2019-01-22 PROCEDURE — 99214 OFFICE O/P EST MOD 30 MIN: CPT | Performed by: PHYSICIAN ASSISTANT

## 2019-01-22 PROCEDURE — 1036F TOBACCO NON-USER: CPT | Performed by: PHYSICIAN ASSISTANT

## 2019-01-22 PROCEDURE — 3074F SYST BP LT 130 MM HG: CPT | Performed by: PHYSICIAN ASSISTANT

## 2019-01-22 PROCEDURE — 3008F BODY MASS INDEX DOCD: CPT | Performed by: PHYSICIAN ASSISTANT

## 2019-01-22 RX ORDER — EMOLLIENT COMBINATION NO. 15
CREAM (GRAM) MISCELLANEOUS
COMMUNITY

## 2019-01-22 RX ORDER — CELECOXIB 200 MG/1
CAPSULE ORAL
COMMUNITY
End: 2019-01-22 | Stop reason: ALTCHOICE

## 2019-01-22 RX ORDER — PREGABALIN 50 MG/1
CAPSULE ORAL
COMMUNITY
End: 2019-01-22 | Stop reason: ALTCHOICE

## 2019-01-22 RX ORDER — MELOXICAM 7.5 MG/1
TABLET ORAL
Refills: 0 | COMMUNITY
Start: 2018-10-19 | End: 2019-01-22 | Stop reason: ALTCHOICE

## 2019-01-22 NOTE — PROGRESS NOTES
Subjective:     Gio Williamson is a 79 y o  male who presents to the office today for a preoperative consultation at the request of surgeon Leonie Riddle MD who plans on performing unilateral primary osteoarthritis right hip on February 11  This consultation is requested for the specific conditions prompting preoperative evaluation (i e  because of potential affect on operative risk): atrial fibrillation  Planned anesthesia: general  The patient has the following known anesthesia issues: none  Patients bleeding risk: Eliquis  Patient does not have objections to receiving blood products if needed  The following portions of the patient's history were reviewed and updated as appropriate:   He  has a past medical history of Atrial fibrillation ([facility] [address] ); Benign hypertension; Blood coagulation disorder ([address] ); Hypertriglyceridemia; Lipid metabolism disorder; Neurogenic bladder; and Other postprocedural cardiac functional disturbances following cardiac surgery  He   Patient Active Problem List    Diagnosis Date Noted    Neurogenic bladder 01/22/2019    Preop examination 01/22/2019    Hip arthritis 01/22/2019    Atrial flutter ([address] ) 11/29/2018    New onset a-fib ([address] ) 11/21/2018    Chronic bilateral low back pain without sciatica 04/24/2018    Cardiomyopathy ([facility] [address] ) 05/20/2014    Mixed hyperlipidemia 02/24/2014    Essential hypertension 01/28/2014     He  has a past surgical history that includes Knee surgery (Bilateral) and Lipoma resection  His family history includes Colon cancer in his mother; Diabetes in his mother  He  reports that he has quit smoking  He has never used smokeless tobacco  He reports that he drinks alcohol  He reports that he does not use drugs    Current Outpatient Prescriptions   Medication Sig Dispense Refill    apixaban (ELIQUIS) 5 mg Take 1 tablet (5 mg total) by mouth 2 (two) times a day 60 tablet 4    Cream Base (PCCA LIPODERM BASE) CREA PCCA Lipoderm Base cream      lidocaine (LIDODERM) 5 %   0    metoprolol succinate (TOPROL-XL) 50 mg 24 hr tablet 1 tab daily 90 tablet 0    Mirabegron ER (MYRBETRIQ) 25 MG TB24 Daily      tamsulosin (FLOMAX) 0 4 mg Take 0 4 mg by mouth daily with dinner        traMADol (ULTRAM) 50 mg tablet Take 1 tablet (50 mg total) by mouth every 6 (six) hours as needed for moderate pain 30 tablet 0     No current facility-administered medications for this visit  Current Outpatient Prescriptions on File Prior to Visit   Medication Sig    apixaban (ELIQUIS) 5 mg Take 1 tablet (5 mg total) by mouth 2 (two) times a day    lidocaine (LIDODERM) 5 %     metoprolol succinate (TOPROL-XL) 50 mg 24 hr tablet 1 tab daily    tamsulosin (FLOMAX) 0 4 mg Take 0 4 mg by mouth daily with dinner      traMADol (ULTRAM) 50 mg tablet Take 1 tablet (50 mg total) by mouth every 6 (six) hours as needed for moderate pain    [DISCONTINUED] ramipril (ALTACE) 2 5 mg capsule Take 1 capsule (2 5 mg total) by mouth daily    [DISCONTINUED] simvastatin (ZOCOR) 20 mg tablet take 1 tablet by mouth at bedtime     No current facility-administered medications on file prior to visit  He has No Known Allergies       Review of Systems  Pertinent items are noted in HPI  Objective:     /64   Pulse 67   Temp 97 8 °F (36 6 °C)   Ht 6' 3 5" (1 918 m)   Wt 86 6 kg (191 lb)   SpO2 98%   BMI 23 56 kg/m²   General appearance: alert and oriented, in no acute distress  Head: Normocephalic, without obvious abnormality, atraumatic  Eyes: negative  Ears: normal TM's and external ear canals both ears  Nose: Nares normal  Septum midline  Mucosa normal  No drainage or sinus tenderness  Throat: lips, mucosa, and tongue normal; teeth and gums normal  Neck: no adenopathy, no carotid bruit, supple, symmetrical, trachea midline and thyroid not enlarged, symmetric, no tenderness/mass/nodules  Back: negative, symmetric, no curvature  ROM normal  No CVA tenderness    Lungs: clear to auscultation bilaterally  Heart: normal rate, no afib noted, ocassional dropped beat noted  Abdomen: soft, non-tender; bowel sounds normal; no masses,  no organomegaly  Extremities: extremities normal, warm and well-perfused; no cyanosis, clubbing, or edema  Pulses: 2+ and symmetric  Skin: Skin color, texture, turgor normal  No rashes or lesions  Lymph nodes: Cervical, supraclavicular, and axillary nodes normal   Neurologic: Grossly normal    Predictors of intubation difficulty:   Morbid obesity? no   Anatomically abnormal facies? no   Prominent incisors? no   Receding mandible? no   Short, thick neck? no   Neck range of motion: normal   Mallampati score: III (soft and hard palate and base of uvula visible)   Dentition: No chipped, loose, or missing teeth      Cardiographics  ECG: atrial fibrillation  Echocardiogram: not done    Imaging  Chest x-ray: normal     Lab Review   Admission on 11/29/2018, Discharged on 11/30/2018   Component Date Value    WBC 11/29/2018 5 01     RBC 11/29/2018 4 78     Hemoglobin 11/29/2018 16 0     Hematocrit 11/29/2018 46 6     MCV 11/29/2018 98     MCH 11/29/2018 33 5     MCHC 11/29/2018 34 3     RDW 11/29/2018 11 8     MPV 11/29/2018 10 9     Platelets 04/31/4908 183     nRBC 11/29/2018 0     Neutrophils Relative 11/29/2018 69     Immat GRANS % 11/29/2018 0     Lymphocytes Relative 11/29/2018 22     Monocytes Relative 11/29/2018 8     Eosinophils Relative 11/29/2018 1     Basophils Relative 11/29/2018 0     Neutrophils Absolute 11/29/2018 3 40     Immature Grans Absolute 11/29/2018 0 02     Lymphocytes Absolute 11/29/2018 1 10     Monocytes Absolute 11/29/2018 0 41     Eosinophils Absolute 11/29/2018 0 07     Basophils Absolute 11/29/2018 0 01     Sodium 11/29/2018 140     Potassium 11/29/2018 3 9     Chloride 11/29/2018 103     CO2 11/29/2018 28     ANION GAP 11/29/2018 9     BUN 11/29/2018 18     Creatinine 11/29/2018 1 03     Glucose 11/29/2018 99     Calcium 11/29/2018 9 5     eGFR 11/29/2018 75     Troponin I 11/29/2018 <0 02     Magnesium 11/29/2018 2 2     Sodium 11/30/2018 143     Potassium 11/30/2018 4 1     Chloride 11/30/2018 107     CO2 11/30/2018 26     ANION GAP 11/30/2018 10     BUN 11/30/2018 16     Creatinine 11/30/2018 0 95     Glucose 11/30/2018 87     Glucose, Fasting 11/30/2018 87     Calcium 11/30/2018 9 2     AST 11/30/2018 23     ALT 11/30/2018 38     Alkaline Phosphatase 11/30/2018 84     Total Protein 11/30/2018 6 5     Albumin 11/30/2018 3 6     Total Bilirubin 11/30/2018 0 70     eGFR 11/30/2018 83     WBC 11/30/2018 4 34     RBC 11/30/2018 4 60     Hemoglobin 11/30/2018 15 5     Hematocrit 11/30/2018 44 5     MCV 11/30/2018 97     MCH 11/30/2018 33 7     MCHC 11/30/2018 34 8     RDW 11/30/2018 11 8     MPV 11/30/2018 10 8     Platelets 15/75/1469 163     nRBC 11/30/2018 0     Neutrophils Relative 11/30/2018 60     Immat GRANS % 11/30/2018 1     Lymphocytes Relative 11/30/2018 26     Monocytes Relative 11/30/2018 9     Eosinophils Relative 11/30/2018 3     Basophils Relative 11/30/2018 1     Neutrophils Absolute 11/30/2018 2 66     Immature Grans Absolute 11/30/2018 0 02     Lymphocytes Absolute 11/30/2018 1 13     Monocytes Absolute 11/30/2018 0 39     Eosinophils Absolute 11/30/2018 0 12     Basophils Absolute 11/30/2018 0 02     Troponin I 11/30/2018 <0 02     Protocol Name 11/30/2018 [facility]     Time In Exercise Phase 11/30/2018 00:03:00     MAX   SYSTOLIC BP 27/39/2477 339     Max Diastolic Bp 31/40/0534 80     Max Heart Rate 11/30/2018 115     Max Predicted Heart Rate 11/30/2018 154     Reason for Termination 11/30/2018 Protocol Complete     Test Indication 11/30/2018 Palpitations     Target Hr Formular 11/30/2018 (220 - Age)*100%     Ventricular Rate 11/29/2018 72     Atrial Rate 11/29/2018 72     WI Interval 11/29/2018 246     QRSD Interval 11/29/2018 86     QT Interval 11/29/2018 372     QTC Interval 11/29/2018 407     P Axis 11/29/2018 72     QRS Axis 11/29/2018 81     T Wave Axis 11/29/2018 74         Assessment:     79 y o  male with planned surgery as above  Known risk factors for perioperative complications: None  atrial fibrillaton on Eliquis    Difficulty with intubation is not anticipated  Cardiac Risk Estimation: low, Cleared for surgery    Current medications which may produce withdrawal symptoms if withheld perioperatively: none      Plan:     1  Preoperative workup as follows none  2  Change in medication regimen before surgery: discontinue Eliquis 3 days prior to surgery  3  Prophylaxis for cardiac events with perioperative beta-blockers: should be considered, specific regimen per anesthesia  4  Invasive hemodynamic monitoring perioperatively: not indicated  5  Deep vein thrombosis prophylaxis postoperatively:regimen to be chosen by surgical team   6  Surveillance for postoperative MI with ECG immediately postoperatively and on postoperative days 1 and 2 AND troponin levels 24 hours postoperatively and on day 4 or hospital discharge (whichever comes first): not indicated    7  Other measures: none

## 2019-01-30 ENCOUNTER — TELEPHONE (OUTPATIENT)
Dept: CARDIOLOGY CLINIC | Facility: CLINIC | Age: 69
End: 2019-01-30

## 2019-01-30 NOTE — TELEPHONE ENCOUNTER
----- Message from Seema Horn MD sent at 1/29/2019 11:15 PM EST -----  Regarding: EKG pre op   I reviewed the EKG done at [facility] for preop  It looks like he is back in AFib  But, the heart rate is well controlled  Patient to go ahead and get his hip surgery  We will deal with paroxysmal atrial fibrillation after his surgery  Please let me know if he has any other questions I will be happy to talk to him

## 2019-02-04 DIAGNOSIS — I10 ESSENTIAL HYPERTENSION: Primary | ICD-10-CM

## 2019-02-04 RX ORDER — RAMIPRIL 2.5 MG/1
2.5 CAPSULE ORAL DAILY
Qty: 90 CAPSULE | Refills: 3 | Status: SHIPPED | OUTPATIENT
Start: 2019-02-04 | End: 2020-01-27 | Stop reason: SDUPTHER

## 2019-02-18 ENCOUNTER — TELEPHONE (OUTPATIENT)
Dept: CARDIOLOGY CLINIC | Facility: CLINIC | Age: 69
End: 2019-02-18

## 2019-02-18 NOTE — TELEPHONE ENCOUNTER
PT WAS TAKING 5MG OF ELIQUIS PRIOR TO HIP SURGERY  PT DOSAGE WAS CHANGED TO 2 5MG AFTER SURGERY  DOSAGE WAS CHANGED BY DR SARABIA  PT RIGHT LEG IS SWOLLEN ALL THE WAY DOWN TO THE ANKLE (WITH BURNING)  PT WANTS TO KNOW IF HE SHOULD GO BACK TO 5MG  PT HAD A VISITING NURSE THIS MORNING AND ALL VITALS WERE GOOD  PT IS CONCERNED ABOUT SWELLING  TRANSFERRED CALL TO Aguila Vick

## 2019-02-18 NOTE — TELEPHONE ENCOUNTER
S/w pt, he has been put on a decreased dose of Eliquis from 5mg to 2 5mg after hip surgery  Pt verbalized concern about blood clots  Pt called Dr Sulema Gowers office and has not received a response yet  Pt stated that he has been icing and elevating his leg and also has on TEDs but the swelling has not gone down  Pt states that the incision site is also oozing  Pt verbally understood to go to the ER

## 2019-04-08 DIAGNOSIS — E78.2 MIXED HYPERLIPIDEMIA: Primary | ICD-10-CM

## 2019-04-08 RX ORDER — SIMVASTATIN 20 MG
20 TABLET ORAL
Qty: 90 TABLET | Refills: 3 | Status: SHIPPED | OUTPATIENT
Start: 2019-04-08 | End: 2020-05-18 | Stop reason: SDUPTHER

## 2019-04-18 ENCOUNTER — TELEPHONE (OUTPATIENT)
Dept: CARDIOLOGY CLINIC | Facility: CLINIC | Age: 69
End: 2019-04-18

## 2019-08-19 ENCOUNTER — OFFICE VISIT (OUTPATIENT)
Dept: CARDIOLOGY CLINIC | Facility: CLINIC | Age: 69
End: 2019-08-19
Payer: COMMERCIAL

## 2019-08-19 VITALS
BODY MASS INDEX: 23.02 KG/M2 | SYSTOLIC BLOOD PRESSURE: 112 MMHG | OXYGEN SATURATION: 97 % | WEIGHT: 189 LBS | HEIGHT: 76 IN | HEART RATE: 82 BPM | DIASTOLIC BLOOD PRESSURE: 78 MMHG

## 2019-08-19 DIAGNOSIS — I48.91 NEW ONSET A-FIB (HCC): ICD-10-CM

## 2019-08-19 DIAGNOSIS — I10 HYPERTENSION, UNSPECIFIED TYPE: ICD-10-CM

## 2019-08-19 DIAGNOSIS — R06.02 SHORTNESS OF BREATH: ICD-10-CM

## 2019-08-19 DIAGNOSIS — I71.2 THORACIC AORTIC ANEURYSM WITHOUT RUPTURE (HCC): ICD-10-CM

## 2019-08-19 DIAGNOSIS — E78.2 COMBINED HYPERLIPIDEMIA: ICD-10-CM

## 2019-08-19 DIAGNOSIS — I48.0 PAF (PAROXYSMAL ATRIAL FIBRILLATION) (HCC): Primary | ICD-10-CM

## 2019-08-19 PROCEDURE — 99214 OFFICE O/P EST MOD 30 MIN: CPT | Performed by: INTERNAL MEDICINE

## 2019-08-19 PROCEDURE — 3074F SYST BP LT 130 MM HG: CPT | Performed by: INTERNAL MEDICINE

## 2019-08-19 NOTE — PROGRESS NOTES
[facility] [place]  [address] [address]  Cardiology Follow Up    Katie Nava  1950  [id number]      1  PAF (paroxysmal atrial fibrillation) ([place] [address] )     2  Hypertension, unspecified type     3  Thoracic aortic aneurysm without rupture ([place] [address] )     4  Combined hyperlipidemia         Chief Complaint   Patient presents with    Follow-up    Shortness of Breath     Breathing heavier than normal after going up flights of stairs and while hiking    Atrial Fibrillation     Noticed he's in afib all the time       Interval History:   Patient presents for follow-up visit  Patient does have history of paroxysmal atrial fibrillation on anticoagulation  Patient also has symptoms of shortness of breath with exertion which has been recent  Patient denies any chest pain  No history of leg edema orthopnea PND  No history of presyncope syncope  He also has history of dilated thoracic aorta and has not had any imaging study in the recent past   He states that he has been compliant with all his present medications  No bleeding issues      Patient Active Problem List   Diagnosis    Cardiomyopathy ([place] [address] )    Mixed hyperlipidemia    Essential hypertension    Chronic bilateral low back pain without sciatica    Atrial flutter (HCC)    Neurogenic bladder    Preop examination    Hip arthritis     Past Medical History:   Diagnosis Date    Atrial fibrillation ([place] [address] )     Benign hypertension     Blood coagulation disorder (HCC)     Hypertriglyceridemia     Lipid metabolism disorder     Neurogenic bladder     Other postprocedural cardiac functional disturbances following cardiac surgery      Social History     Socioeconomic History    Marital status: /Civil Union     Spouse name: Not on file    Number of children: Not on file    Years of education: Not on file    Highest education level: Not on file   Occupational History     Comment: employed   Social Needs    Financial resource strain: Not on file    Food insecurity:     Worry: Not on file     Inability: Not on file    Transportation needs:     Medical: Not on file     Non-medical: Not on file   Tobacco Use    Smoking status: Former Smoker    Smokeless tobacco: Never Used   Substance and Sexual Activity    Alcohol use:  Yes    Drug use: No    Sexual activity: Not on file   Lifestyle    Physical activity:     Days per week: Not on file     Minutes per session: Not on file    Stress: Not on file   Relationships    Social connections:     Talks on phone: Not on file     Gets together: Not on file     Attends Yarsani service: Not on file     Active member of club or organization: Not on file     Attends meetings of clubs or organizations: Not on file     Relationship status: Not on file    Intimate partner violence:     Fear of current or ex partner: Not on file     Emotionally abused: Not on file     Physically abused: Not on file     Forced sexual activity: Not on file   Other Topics Concern    Not on file   Social History Narrative    Always uses a seatbelt    Daily caffeine consumption, 2-3 servings per day    Seeing a dentist      Family History   Problem Relation Age of Onset    Diabetes Mother     Colon cancer Mother      Past Surgical History:   Procedure Laterality Date    KNEE SURGERY Bilateral     LIPOMA RESECTION         Current Outpatient Medications:     apixaban (ELIQUIS) 5 mg, Take 1 tablet (5 mg total) by mouth 2 (two) times a day, Disp: 60 tablet, Rfl: 4    Cream Base (PCCA LIPODERM BASE) CREA, PCCA Lipoderm Base cream, Disp: , Rfl:     lidocaine (LIDODERM) 5 %, , Disp: , Rfl: 0    metoprolol succinate (TOPROL-XL) 50 mg 24 hr tablet, 1 tab daily, Disp: 90 tablet, Rfl: 0    ramipril (ALTACE) 2 5 mg capsule, Take 1 capsule (2 5 mg total) by mouth daily, Disp: 90 capsule, Rfl: 3    simvastatin (ZOCOR) 20 mg tablet, Take 1 tablet (20 mg total) by mouth daily at bedtime, Disp: 90 tablet, Rfl: 3    tamsulosin (FLOMAX) 0 4 mg, Take 0 4 mg by mouth daily with dinner  , Disp: , Rfl:     Mirabegron ER (MYRBETRIQ) 25 MG TB24, Daily, Disp: , Rfl:     traMADol (ULTRAM) 50 mg tablet, Take 1 tablet (50 mg total) by mouth every 6 (six) hours as needed for moderate pain, Disp: 30 tablet, Rfl: 0  No Known Allergies    Labs:  No visits with results within 2 Month(s) from this visit     Latest known visit with results is:   Admission on 11/29/2018, Discharged on 11/30/2018   Component Date Value    WBC 11/29/2018 5 01     RBC 11/29/2018 4 78     Hemoglobin 11/29/2018 16 0     Hematocrit 11/29/2018 46 6     MCV 11/29/2018 98     MCH 11/29/2018 33 5     MCHC 11/29/2018 34 3     RDW 11/29/2018 11 8     MPV 11/29/2018 10 9     Platelets 04/32/4535 183     nRBC 11/29/2018 0     Neutrophils Relative 11/29/2018 69     Immat GRANS % 11/29/2018 0     Lymphocytes Relative 11/29/2018 22     Monocytes Relative 11/29/2018 8     Eosinophils Relative 11/29/2018 1     Basophils Relative 11/29/2018 0     Neutrophils Absolute 11/29/2018 3 40     Immature Grans Absolute 11/29/2018 0 02     Lymphocytes Absolute 11/29/2018 1 10     Monocytes Absolute 11/29/2018 0 41     Eosinophils Absolute 11/29/2018 0 07     Basophils Absolute 11/29/2018 0 01     Sodium 11/29/2018 140     Potassium 11/29/2018 3 9     Chloride 11/29/2018 103     CO2 11/29/2018 28     ANION GAP 11/29/2018 9     BUN 11/29/2018 18     Creatinine 11/29/2018 1 03     Glucose 11/29/2018 99     Calcium 11/29/2018 9 5     eGFR 11/29/2018 75     Troponin I 11/29/2018 <0 02     Magnesium 11/29/2018 2 2     Sodium 11/30/2018 143     Potassium 11/30/2018 4 1     Chloride 11/30/2018 107     CO2 11/30/2018 26     ANION GAP 11/30/2018 10     BUN 11/30/2018 16     Creatinine 11/30/2018 0 95     Glucose 11/30/2018 87     Glucose, Fasting 11/30/2018 87     Calcium 11/30/2018 9 2     AST 11/30/2018 23     ALT 11/30/2018 38     Alkaline Phosphatase 11/30/2018 84     Total Protein 11/30/2018 6 5     Albumin 11/30/2018 3 6     Total Bilirubin 11/30/2018 0 70     eGFR 11/30/2018 83     WBC 11/30/2018 4 34     RBC 11/30/2018 4 60     Hemoglobin 11/30/2018 15 5     Hematocrit 11/30/2018 44 5     MCV 11/30/2018 97     MCH 11/30/2018 33 7     MCHC 11/30/2018 34 8     RDW 11/30/2018 11 8     MPV 11/30/2018 10 8     Platelets 53/26/3714 163     nRBC 11/30/2018 0     Neutrophils Relative 11/30/2018 60     Immat GRANS % 11/30/2018 1     Lymphocytes Relative 11/30/2018 26     Monocytes Relative 11/30/2018 9     Eosinophils Relative 11/30/2018 3     Basophils Relative 11/30/2018 1     Neutrophils Absolute 11/30/2018 2 66     Immature Grans Absolute 11/30/2018 0 02     Lymphocytes Absolute 11/30/2018 1 13     Monocytes Absolute 11/30/2018 0 39     Eosinophils Absolute 11/30/2018 0 12     Basophils Absolute 11/30/2018 0 02     Troponin I 11/30/2018 <0 02     Protocol Name 11/30/2018 [facility]     Time In Exercise Phase 11/30/2018 00:03:00     MAX  SYSTOLIC BP 64/55/3314 427     Max Diastolic Bp 08/50/0083 80     Max Heart Rate 11/30/2018 115     Max Predicted Heart Rate 11/30/2018 154     Reason for Termination 11/30/2018 Protocol Complete     Test Indication 11/30/2018 Palpitations     Target Hr Formular 11/30/2018 (220 - Age)*100%     Ventricular Rate 11/29/2018 72     Atrial Rate 11/29/2018 72     NM Interval 11/29/2018 246     QRSD Interval 11/29/2018 86     QT Interval 11/29/2018 372     QTC Interval 11/29/2018 407     P Axis 11/29/2018 72     QRS Axis 11/29/2018 81     T Wave Axis 11/29/2018 74      Imaging: No results found      Review of Systems:  Review of Systems     REVIEW OF SYSTEMS:  Constitutional:  Denies fever or chills   Eyes:  Denies change in visual acuity   HENT:  Denies nasal congestion or sore throat   Respiratory:   shortness of breath   Cardiovascular:  Denies chest pain or edema   GI:  Denies abdominal pain, nausea, vomiting, bloody stools or diarrhea   :  Denies dysuria, frequency, difficulty in micturition and nocturia  Musculoskeletal:  Denies back pain or joint pain   Neurologic:  Denies headache, focal weakness or sensory changes   Endocrine:  Denies polyuria or polydipsia   Lymphatic:  Denies swollen glands   Psychiatric:  Denies depression or anxiety     Physical Exam:    /78   Pulse 82   Ht 6' 3 5" (1 918 m)   Wt 85 7 kg (189 lb)   SpO2 97%   BMI 23 31 kg/m²     Physical Exam   PHYSICAL EXAM:  General:  Patient is not in acute distress   Head: Normocephalic, Atraumatic  HEENT:  Both pupils normal-size atraumatic, normocephalic, nonicteric  Neck:  JVP not raised  Trachea central  No carotid bruit  Respiratory:  normal breath sounds no crackles  no rhonchi  Cardiovascular:  Regular rate and rhythm no S3 no murmurs  GI:  Abdomen soft nontender  No organomegaly  Lymphatic:  No cervical or inguinal lymphadenopathy  Neurologic:  Patient is awake alert, oriented   Grossly nonfocal    Extremities reveal no edema    Discussion/Summary:  Patient with symptoms of shortness of breath with exertion of unclear etiology  Possible etiologies were discussed at length with the patient  Patient does have risk factors for coronary artery disease  Patient will be scheduled for a stress echocardiogram to assess for exercise capacity as well as ischemia  Symptoms to watch out from cardiac standpoint which would indicate the need for further cardiac evaluation discussed  Patient will continue rate control on anticoagulation for paroxysmal atrial fibrillation  Patient has noticed that his heart rate does not go too high on his smart watch  Patient will also be scheduled for a CT of the chest to reassess thoracic aortic size with history of dilated thoracic aorta  Check blood work including CBC CMP and lipid profile  Follow-up in 6 to 8 weeks or earlier as needed    Follow-up with primary care physician  Patient is agreeable with the plan of care

## 2019-08-22 ENCOUNTER — TELEPHONE (OUTPATIENT)
Dept: CARDIOLOGY CLINIC | Facility: CLINIC | Age: 69
End: 2019-08-22

## 2019-08-22 NOTE — TELEPHONE ENCOUNTER
Code: 66297-XXXY stress with contrast  Test is not covered Trevon Roberts)  Needs peer to peer  Number: [phone] to request a review by phone    Pt's ID: [id number]  : 1950  Ref: [id number]

## 2019-08-22 NOTE — TELEPHONE ENCOUNTER
Approved   Confirmation [id number]    Please let cardiac testing know to schedule the stress echo    Thank you

## 2019-09-16 DIAGNOSIS — I10 HYPERTENSION, ESSENTIAL: ICD-10-CM

## 2019-09-16 RX ORDER — METOPROLOL SUCCINATE 50 MG/1
TABLET, EXTENDED RELEASE ORAL
Qty: 90 TABLET | Refills: 3 | Status: SHIPPED | OUTPATIENT
Start: 2019-09-16 | End: 2019-11-06 | Stop reason: SDUPTHER

## 2019-09-30 ENCOUNTER — HOSPITAL ENCOUNTER (OUTPATIENT)
Dept: CT IMAGING | Facility: CLINIC | Age: 69
Discharge: HOME | End: 2019-09-30
Payer: COMMERCIAL

## 2019-09-30 ENCOUNTER — HOSPITAL ENCOUNTER (OUTPATIENT)
Dept: NON INVASIVE DIAGNOSTICS | Facility: CLINIC | Age: 69
Discharge: HOME | End: 2019-09-30
Payer: COMMERCIAL

## 2019-09-30 DIAGNOSIS — R06.02 SHORTNESS OF BREATH: ICD-10-CM

## 2019-09-30 DIAGNOSIS — I71.2 THORACIC AORTIC ANEURYSM WITHOUT RUPTURE (HCC): ICD-10-CM

## 2019-09-30 PROCEDURE — 93350 STRESS TTE ONLY: CPT

## 2019-09-30 PROCEDURE — 71250 CT THORAX DX C-: CPT

## 2019-09-30 PROCEDURE — 93351 STRESS TTE COMPLETE: CPT | Performed by: INTERNAL MEDICINE

## 2019-10-02 ENCOUNTER — TELEPHONE (OUTPATIENT)
Dept: CARDIOLOGY CLINIC | Facility: CLINIC | Age: 69
End: 2019-10-02

## 2019-10-07 NOTE — TELEPHONE ENCOUNTER
S/w pt per Dr Alex Brannon  Verbal understanding was given  He understood his CT of the chest shows mild dilation of the thoracic aorta-which is stable and he has a lung nodule of 3 mm- he understood he is to repeat chest of the chest in 1 year

## 2019-10-07 NOTE — TELEPHONE ENCOUNTER
Please call the patient  CT of the chest showed mild dilatation of the thoracic aorta which is stable  Patient also has a lung nodule 3 mm  He will need a repeat CT of the chest in 1 year

## 2019-11-06 ENCOUNTER — OFFICE VISIT (OUTPATIENT)
Dept: CARDIOLOGY CLINIC | Facility: CLINIC | Age: 69
End: 2019-11-06
Payer: COMMERCIAL

## 2019-11-06 VITALS
DIASTOLIC BLOOD PRESSURE: 68 MMHG | SYSTOLIC BLOOD PRESSURE: 118 MMHG | HEIGHT: 76 IN | BODY MASS INDEX: 23.02 KG/M2 | WEIGHT: 189 LBS | HEART RATE: 82 BPM | OXYGEN SATURATION: 98 %

## 2019-11-06 DIAGNOSIS — I48.0 PAF (PAROXYSMAL ATRIAL FIBRILLATION) (HCC): Primary | ICD-10-CM

## 2019-11-06 DIAGNOSIS — I10 HYPERTENSION, UNSPECIFIED TYPE: ICD-10-CM

## 2019-11-06 DIAGNOSIS — I48.11 LONGSTANDING PERSISTENT ATRIAL FIBRILLATION (HCC): Primary | ICD-10-CM

## 2019-11-06 DIAGNOSIS — E78.2 COMBINED HYPERLIPIDEMIA: ICD-10-CM

## 2019-11-06 DIAGNOSIS — I71.2 THORACIC AORTIC ANEURYSM WITHOUT RUPTURE (HCC): ICD-10-CM

## 2019-11-06 DIAGNOSIS — I10 HYPERTENSION, ESSENTIAL: ICD-10-CM

## 2019-11-06 PROCEDURE — 3078F DIAST BP <80 MM HG: CPT | Performed by: INTERNAL MEDICINE

## 2019-11-06 PROCEDURE — 99214 OFFICE O/P EST MOD 30 MIN: CPT | Performed by: INTERNAL MEDICINE

## 2019-11-06 PROCEDURE — 3074F SYST BP LT 130 MM HG: CPT | Performed by: INTERNAL MEDICINE

## 2019-11-06 RX ORDER — METOPROLOL SUCCINATE 50 MG/1
TABLET, EXTENDED RELEASE ORAL
Qty: 90 TABLET | Refills: 3
Start: 2019-11-06 | End: 2020-03-12 | Stop reason: ALTCHOICE

## 2019-11-06 NOTE — PROGRESS NOTES
[facility] [address] [address]  Cardiology Follow Up    Anup Jackson  1950  [id number]      1  PAF (paroxysmal atrial fibrillation) ([facility] )     2  Hypertension, unspecified type     3  Combined hyperlipidemia     4  Thoracic aortic aneurysm without rupture [facility])         Chief Complaint   Patient presents with    Follow-up                Interval History:  Patient presents for follow-up visit  Patient does have history of paroxysmal atrial fibrillation in the past but more recently persistent atrial fibrillation  Patient does have shortness of breath and weakness and tiredness  Patient did have a stress echocardiogram which was negative for ischemia  Patient still feels short of breath and tired at times  No history of leg edema orthopnea PND  No history of presyncope syncope  No history of bleeding issues      Patient Active Problem List   Diagnosis    Cardiomyopathy ([facility] )    Mixed hyperlipidemia    Essential hypertension    Chronic bilateral low back pain without sciatica    Atrial flutter (HCC)    Neurogenic bladder    Preop examination    Hip arthritis     Past Medical History:   Diagnosis Date    Atrial fibrillation ([place] [address] )     Benign hypertension     Blood coagulation disorder ([place] [address] )     Hypertriglyceridemia     Lipid metabolism disorder     Neurogenic bladder     Other postprocedural cardiac functional disturbances following cardiac surgery      Social History     Socioeconomic History    Marital status: /Civil Union     Spouse name: Not on file    Number of children: Not on file    Years of education: Not on file    Highest education level: Not on file   Occupational History     Comment: employed   Social Needs    Financial resource strain: Not on file    Food insecurity:     Worry: Not on file     Inability: Not on file    Transportation needs:     Medical: Not on file     Non-medical: Not on file   Tobacco Use  Smoking status: Former Smoker    Smokeless tobacco: Never Used   Substance and Sexual Activity    Alcohol use:  Yes    Drug use: No    Sexual activity: Not on file   Lifestyle    Physical activity:     Days per week: Not on file     Minutes per session: Not on file    Stress: Not on file   Relationships    Social connections:     Talks on phone: Not on file     Gets together: Not on file     Attends Hindu service: Not on file     Active member of club or organization: Not on file     Attends meetings of clubs or organizations: Not on file     Relationship status: Not on file    Intimate partner violence:     Fear of current or ex partner: Not on file     Emotionally abused: Not on file     Physically abused: Not on file     Forced sexual activity: Not on file   Other Topics Concern    Not on file   Social History Narrative    Always uses a seatbelt    Daily caffeine consumption, 2-3 servings per day    Seeing a dentist      Family History   Problem Relation Age of Onset    Diabetes Mother     Colon cancer Mother      Past Surgical History:   Procedure Laterality Date    KNEE SURGERY Bilateral     LIPOMA RESECTION         Current Outpatient Medications:     apixaban (ELIQUIS) 5 mg, Take 1 tablet (5 mg total) by mouth 2 (two) times a day, Disp: 60 tablet, Rfl: 11    Cream Base (PCCA LIPODERM BASE) CREA, PCCA Lipoderm Base cream, Disp: , Rfl:     lidocaine (LIDODERM) 5 %, , Disp: , Rfl: 0    metoprolol succinate (TOPROL-XL) 50 mg 24 hr tablet, 1 tab daily, Disp: 90 tablet, Rfl: 3    Mirabegron ER (MYRBETRIQ) 25 MG TB24, Daily, Disp: , Rfl:     ramipril (ALTACE) 2 5 mg capsule, Take 1 capsule (2 5 mg total) by mouth daily, Disp: 90 capsule, Rfl: 3    simvastatin (ZOCOR) 20 mg tablet, Take 1 tablet (20 mg total) by mouth daily at bedtime, Disp: 90 tablet, Rfl: 3    tamsulosin (FLOMAX) 0 4 mg, Take 0 4 mg by mouth daily with dinner  , Disp: , Rfl:     traMADol (ULTRAM) 50 mg tablet, Take 1 tablet (50 mg total) by mouth every 6 (six) hours as needed for moderate pain, Disp: 30 tablet, Rfl: 0  No Known Allergies    Labs:  No visits with results within 2 Month(s) from this visit     Latest known visit with results is:   Admission on 11/29/2018, Discharged on 11/30/2018   Component Date Value    WBC 11/29/2018 5 01     RBC 11/29/2018 4 78     Hemoglobin 11/29/2018 16 0     Hematocrit 11/29/2018 46 6     MCV 11/29/2018 98     MCH 11/29/2018 33 5     MCHC 11/29/2018 34 3     RDW 11/29/2018 11 8     MPV 11/29/2018 10 9     Platelets 44/72/5111 183     nRBC 11/29/2018 0     Neutrophils Relative 11/29/2018 69     Immat GRANS % 11/29/2018 0     Lymphocytes Relative 11/29/2018 22     Monocytes Relative 11/29/2018 8     Eosinophils Relative 11/29/2018 1     Basophils Relative 11/29/2018 0     Neutrophils Absolute 11/29/2018 3 40     Immature Grans Absolute 11/29/2018 0 02     Lymphocytes Absolute 11/29/2018 1 10     Monocytes Absolute 11/29/2018 0 41     Eosinophils Absolute 11/29/2018 0 07     Basophils Absolute 11/29/2018 0 01     Sodium 11/29/2018 140     Potassium 11/29/2018 3 9     Chloride 11/29/2018 103     CO2 11/29/2018 28     ANION GAP 11/29/2018 9     BUN 11/29/2018 18     Creatinine 11/29/2018 1 03     Glucose 11/29/2018 99     Calcium 11/29/2018 9 5     eGFR 11/29/2018 75     Troponin I 11/29/2018 <0 02     Magnesium 11/29/2018 2 2     Sodium 11/30/2018 143     Potassium 11/30/2018 4 1     Chloride 11/30/2018 107     CO2 11/30/2018 26     ANION GAP 11/30/2018 10     BUN 11/30/2018 16     Creatinine 11/30/2018 0 95     Glucose 11/30/2018 87     Glucose, Fasting 11/30/2018 87     Calcium 11/30/2018 9 2     AST 11/30/2018 23     ALT 11/30/2018 38     Alkaline Phosphatase 11/30/2018 84     Total Protein 11/30/2018 6 5     Albumin 11/30/2018 3 6     Total Bilirubin 11/30/2018 0 70     eGFR 11/30/2018 83     WBC 11/30/2018 4 34     RBC 11/30/2018 4 60  Hemoglobin 11/30/2018 15 5     Hematocrit 11/30/2018 44 5     MCV 11/30/2018 97     MCH 11/30/2018 33 7     MCHC 11/30/2018 34 8     RDW 11/30/2018 11 8     MPV 11/30/2018 10 8     Platelets 59/42/8427 163     nRBC 11/30/2018 0     Neutrophils Relative 11/30/2018 60     Immat GRANS % 11/30/2018 1     Lymphocytes Relative 11/30/2018 26     Monocytes Relative 11/30/2018 9     Eosinophils Relative 11/30/2018 3     Basophils Relative 11/30/2018 1     Neutrophils Absolute 11/30/2018 2 66     Immature Grans Absolute 11/30/2018 0 02     Lymphocytes Absolute 11/30/2018 1 13     Monocytes Absolute 11/30/2018 0 39     Eosinophils Absolute 11/30/2018 0 12     Basophils Absolute 11/30/2018 0 02     Troponin I 11/30/2018 <0 02     Protocol Name 11/30/2018 [facility]     Time In Exercise Phase 11/30/2018 00:03:00     MAX  SYSTOLIC BP 08/91/5669 437     Max Diastolic Bp 19/76/7629 80     Max Heart Rate 11/30/2018 115     Max Predicted Heart Rate 11/30/2018 154     Reason for Termination 11/30/2018 Protocol Complete     Test Indication 11/30/2018 Palpitations     Target Hr Formular 11/30/2018 (220 - Age)*100%     Ventricular Rate 11/29/2018 72     Atrial Rate 11/29/2018 72     AZ Interval 11/29/2018 246     QRSD Interval 11/29/2018 86     QT Interval 11/29/2018 372     QTC Interval 11/29/2018 407     P Axis 11/29/2018 72     QRS Axis 11/29/2018 81     T Wave Axis 11/29/2018 74      Imaging: No results found      Review of Systems:  Review of Systems   REVIEW OF SYSTEMS:  Constitutional:  Denies fever or chills   Eyes:  Denies change in visual acuity   HENT:  Denies nasal congestion or sore throat   Respiratory:   shortness of breath   Cardiovascular:  Denies chest pain or edema   GI:  Denies abdominal pain, nausea, vomiting, bloody stools or diarrhea   :  Denies dysuria, frequency, difficulty in micturition and nocturia  Musculoskeletal:  Denies back pain or joint pain   Neurologic: Denies headache, focal weakness or sensory changes   Endocrine:  Denies polyuria or polydipsia   Lymphatic:  Denies swollen glands   Psychiatric:  Denies depression or anxiety     Physical Exam:    /68   Pulse 82   Ht 6' 3 5" (1 918 m)   Wt 85 7 kg (189 lb)   SpO2 98%   BMI 23 31 kg/m²     Physical Exam   PHYSICAL EXAM:  General:  Patient is not in acute distress   Head: Normocephalic, Atraumatic  HEENT:  Both pupils normal-size atraumatic, normocephalic, nonicteric  Neck:  JVP not raised  Trachea central  No carotid bruit  Respiratory:  normal breath sounds no crackles  no rhonchi  Cardiovascular:  Irregularly irregular  GI:  Abdomen soft nontender  No organomegaly  Lymphatic:  No cervical or inguinal lymphadenopathy  Neurologic:  Patient is awake alert, oriented   Grossly nonfocal  Extremities no edema    Discussion/Summary:  Patient has history of paroxysmal atrial fibrillation in the past but seems to have more persistent atrial fibrillation  Patient will be scheduled for a 24 Holter monitor to assess atrial fibrillation burden as well as heart rate response  Patient did have a stress echocardiogram recently  Ejection fraction low normal at 50%  There was no evidence of inducible ischemia  CT of the chest showed 3 6 cm thoracic aorta which is overall of no concern at this time  Question whether his symptoms of fatigue weakness and tiredness is related to atrial fibrillation  Will refer to electrophysiology regarding consideration for options for ablation  He is willing to proceed with evaluation  Previous cardiovascular studies reviewed with the patient  Patient understands the risks and benefits of anticoagulation to prevent thromboembolic risk  Follow-up in a few months or earlier as needed  Follow-up with primary care physician

## 2019-11-08 ENCOUNTER — HOSPITAL ENCOUNTER (OUTPATIENT)
Dept: NON INVASIVE DIAGNOSTICS | Facility: CLINIC | Age: 69
Discharge: HOME | End: 2019-11-08
Payer: COMMERCIAL

## 2019-11-08 DIAGNOSIS — I48.0 PAF (PAROXYSMAL ATRIAL FIBRILLATION) (HCC): ICD-10-CM

## 2019-11-08 PROCEDURE — 93226 XTRNL ECG REC<48 HR SCAN A/R: CPT

## 2019-11-08 PROCEDURE — 93225 XTRNL ECG REC<48 HRS REC: CPT

## 2019-11-12 PROCEDURE — 93227 XTRNL ECG REC<48 HR R&I: CPT | Performed by: INTERNAL MEDICINE

## 2019-11-20 ENCOUNTER — TELEPHONE (OUTPATIENT)
Dept: CARDIOLOGY CLINIC | Facility: CLINIC | Age: 69
End: 2019-11-20

## 2019-11-20 ENCOUNTER — CONSULT (OUTPATIENT)
Dept: CARDIOLOGY CLINIC | Facility: CLINIC | Age: 69
End: 2019-11-20
Payer: COMMERCIAL

## 2019-11-20 VITALS
DIASTOLIC BLOOD PRESSURE: 84 MMHG | SYSTOLIC BLOOD PRESSURE: 108 MMHG | HEIGHT: 76 IN | BODY MASS INDEX: 23.29 KG/M2 | HEART RATE: 74 BPM | WEIGHT: 191.3 LBS

## 2019-11-20 DIAGNOSIS — Z79.01 CURRENT USE OF LONG TERM ANTICOAGULATION: ICD-10-CM

## 2019-11-20 DIAGNOSIS — I48.11 LONGSTANDING PERSISTENT ATRIAL FIBRILLATION (HCC): ICD-10-CM

## 2019-11-20 DIAGNOSIS — E78.2 MIXED HYPERLIPIDEMIA: ICD-10-CM

## 2019-11-20 DIAGNOSIS — I10 ESSENTIAL HYPERTENSION: Primary | ICD-10-CM

## 2019-11-20 PROCEDURE — 99205 OFFICE O/P NEW HI 60 MIN: CPT | Performed by: INTERNAL MEDICINE

## 2019-11-20 PROCEDURE — 93000 ELECTROCARDIOGRAM COMPLETE: CPT | Performed by: INTERNAL MEDICINE

## 2019-11-20 NOTE — PROGRESS NOTES
Cardiology Consultation     Harlo Hodgkin  [id number]  1950  HEART & VASCULAR [facility] [place] [address]    1  Essential hypertension     2  Longstanding persistent atrial fibrillation  Ambulatory referral to Cardiac Electrophysiology    POCT ECG   3  Mixed hyperlipidemia     4   Current use of long term anticoagulation         History of present illness    The patient has been sent to me by Dr Pepe Lam for management of atrial fibrillation  He has a history of the same for at least last 4-5 years  Previously he used to be paroxysmal  Currently he is in persistent atrial fibrillation for more than 12 months    The patient is a  in a school for over 30 years  He is otherwise extremely active  He can walk for 10 miles without any difficulty    At the current time he is not complaining of anginal like chest pain or chest pressure  No worsening orthopnea or paroxysmal nocturnal dyspnea  No leg swelling    He is not complaining of any significant prolonged palpitation  He does feel it from time to time  His not complaining of presyncope or syncope  He rarely gets orthostatic lightheadedness    He really is not complaining of any exertional intolerance    He is not complaining of snoring, morning fatigue or daytime sleepiness      Patient Active Problem List   Diagnosis    [facility]omyopathy [facility]    Mixed hyperlipidemia    Essential hypertension    Chronic bilateral low back pain without sciatica    Atrial flutter ([facility]    Neurogenic bladder    Preop examination    Hip arthritis    Current use of long term anticoagulation     Past Medical History:   Diagnosis Date    Atrial fibrillation ([facility]     Benign hypertension     [facility]     Hypertriglyceridemia     Lipid metabolism disorder     Neurogenic bladder     Other postprocedural cardiac functional disturbances following cardiac surgery      Social History     Socioeconomic History    Marital status: /Civil Union     Spouse name: Not on file    Number of children: Not on file    Years of education: Not on file    Highest education level: Not on file   Occupational History     Comment: employed   Social Needs    Financial resource strain: Not on file    Food insecurity:     Worry: Not on file     Inability: Not on file   [employer] needs:     Medical: Not on file     Non-medical: Not on file   Tobacco Use    Smoking status: Former Smoker    Smokeless tobacco: Never Used    Tobacco comment: quite in 1972   Substance and Sexual Activity    Alcohol use:  Yes     Alcohol/week: 1 0 standard drinks     Types: 1 Glasses of wine per week     Comment: per night    Drug use: No    Sexual activity: Not on file   Lifestyle    Physical activity:     Days per week: Not on file     Minutes per session: Not on file    Stress: Not on file   Relationships    Social connections:     Talks on phone: Not on file     Gets together: Not on file     Attends Yazdanism service: Not on file     Active member of club or organization: Not on file     Attends meetings of clubs or organizations: Not on file     Relationship status: Not on file    Intimate partner violence:     Fear of current or ex partner: Not on file     Emotionally abused: Not on file     Physically abused: Not on file     Forced sexual activity: Not on file   Other Topics Concern    Not on file   Social History Narrative    Always uses a seatbelt    Daily caffeine consumption, 2-3 servings per day    Seeing a dentist      Family History   Problem Relation Age of Onset    Diabetes Mother     Colon cancer Mother      Past Surgical History:   Procedure Laterality Date    KNEE SURGERY Bilateral     LIPOMA RESECTION         Current Outpatient Medications:     apixaban (ELIQUIS) 5 mg, Take 1 tablet (5 mg total) by mouth 2 (two) times a day, Disp: 61 tablet, Rfl: 11    Cream Base (PCCA LIPODERM BASE) CREA, PCCA Lipoderm Base cream, Disp: , Rfl:     lidocaine (LIDODERM) 5 %, , Disp: , Rfl: 0    metoprolol succinate (TOPROL-XL) 50 mg 24 hr tablet, 1/2  tab daily, Disp: 90 tablet, Rfl: 3    ramipril (ALTACE) 2 5 mg capsule, Take 1 capsule (2 5 mg total) by mouth daily, Disp: 90 capsule, Rfl: 3    simvastatin (ZOCOR) 20 mg tablet, Take 1 tablet (20 mg total) by mouth daily at bedtime, Disp: 90 tablet, Rfl: 3    tamsulosin (FLOMAX) 0 4 mg, Take 0 4 mg by mouth daily with dinner  , Disp: , Rfl:     traMADol (ULTRAM) 50 mg tablet, Take 1 tablet (50 mg total) by mouth every 6 (six) hours as needed for moderate pain, Disp: 30 tablet, Rfl: 0  No Known Allergies  Vitals:    11/20/19 1401   BP: 108/84   BP Location: Left arm   Patient Position: Sitting   Cuff Size: Large   Pulse: 74   Weight: 86 8 kg (191 lb 4 8 oz)   Height: 6' 3 5" (1 918 m)       Labs:  Lab Results   Component Value Date    K 4 1 11/30/2018     11/30/2018    CO2 26 11/30/2018    BUN 16 11/30/2018    CREATININE 0 95 11/30/2018    CALCIUM 9 2 11/30/2018     Lab Results   Component Value Date    TROPONINI <0 02 11/30/2018     Lab Results   Component Value Date    WBC 4 34 11/30/2018    HGB 15 5 11/30/2018    HCT 44 5 11/30/2018    MCV 97 11/30/2018     11/30/2018     No results found for: CHOL, TRIG, HDL, LDLDIRECT  Imaging: No results found  Echo stress study  September 2019  STRESS 2D ECHO RESULTS:   BASELINE: The left ventricle was small  Overall left ventricular systolic function was at the lower limits of normal  Estimated left ventricular ejection fraction was 50 %       PEAK STRESS: This study was inadequate for the diagnostic evaluation of left ventricular regional wall motion  There was an appropriate reduction in left ventricular size  There was an appropriate augmentation in LV function              Nuclear stress study  November 2018  MYOCARDIAL PERFUSION IMAGING:  The image quality was good  Left ventricular size was normal  The TID ratio was 1 0      PERFUSION DEFECTS:  -  There were no perfusion defects      GATED SPECT:  Left ventricular ejection fraction was low normal  LVEF 50%  There was no left ventricular regional abnormality      SUMMARY:  -  ECG conclusions: The stress ECG was negative for ischemia and normal   -  Perfusion imaging: There were no perfusion defects   -  Gated SPECT: Left ventricular ejection fraction was low normal  LVEF 50%  There was no left ventricular regional abnormality      IMPRESSIONS: Normal study  Myocardial perfusion imaging was normal at rest and with stress  Left ventricular systolic function was low normal        Holter  November 2019          Review of Systems:  Review of Systems   All other systems reviewed and are negative  As described in my history of present illness          Physical Exam:  Physical Exam   Constitutional: He is oriented to person, place, and time  He appears well-developed and well-nourished  No distress  Not in any distress at the current time   HENT:   Head: Normocephalic and atraumatic  Right Ear: External ear normal    Left Ear: External ear normal    Nose: Nose normal    Mouth/Throat: Uvula is midline and mucous membranes are normal    Eyes: Pupils are equal, round, and reactive to light  Conjunctivae, EOM and lids are normal  No scleral icterus  No pallor  No cyanosis  No icterus   Neck: Trachea normal and normal range of motion  Neck supple  No JVD present  Carotid bruit is not present  No thyromegaly present  No jugular lymphadenopathy   Cardiovascular: Normal rate, S1 normal, S2 normal, intact distal pulses and normal pulses  An irregularly irregular rhythm present  PMI is not displaced  Exam reveals no gallop, no S3, no S4 and no friction rub  No murmur heard  Pulmonary/Chest: Effort normal and breath sounds normal  No accessory muscle usage  No respiratory distress   He has no decreased breath sounds  He has no wheezes  He has no rhonchi  He has no rales  He exhibits no tenderness  Abdominal: Soft  Normal appearance and bowel sounds are normal  He exhibits no distension and no mass  There is no splenomegaly or hepatomegaly  There is no tenderness  Musculoskeletal: Normal range of motion  He exhibits no edema, tenderness or deformity  Lymphadenopathy:     He has no cervical adenopathy  Neurological: He is alert and oriented to person, place, and time  Facial symmetry is retained  Extraocular movements are retained  Head neck tongue and palate movement are retained and symmetric   Skin: Skin is intact  No abrasion, no lesion and no rash noted  No erythema  Nails show no clubbing  Psychiatric: He has a normal mood and affect  His speech is normal and behavior is normal  Thought content normal    Vitals reviewed  Discussion/Summary:      1  Longstanding persistent atrial fibrillation  We had a very detailed discussion as far as management of atrial fibrillation   my detailed recommendation for this patient are as follows         1 - natural history of disease   atrial fibrillation is a disease of age   prevalence is 1% in the 4th decade , 2-3% by age 72 and 12-13% by age 80   since it increases with age , definitive therapy is indicated         2 - sleep apnea   untreated sleep apnea is the common cause of failure of medication as well as ablation   success rate of paroxysmal atrial fibrillation ablation falls from 80% in the 1st year, to 15% in the 1st year, for untreated severe sleep apnea   the patient has no history of snoring, morning fatigue at times and daytime sleepiness at times  physical examination for short thick neck and crowded posterior pharynx is negative  Low likelihood of IAN       3  Obesity   There is a 4-5% increased risk of AF per unit increase in BMI  Each 5 U increase in BMI raises risk of incident AF by 33%  [facility] 68, no 23,2016  BMI is 23      4  Smoking Smoking increases incidence of AF by 2 fold  LAURO study , Cirilo Ingram al, Heart Rhythm 2011  smoker, quit in 1972    5-Alcohol  53% americans consume alcohol regularly  44% drinkers consume > 5 standard drink on a single occasion in last month  1 standard unit alcohol =12 gms of alcohol  12 oz beer = 4 oz wine = 1 oz whisky = 1 unit alcohol  Moderate habitual consumption increases incidence of AF  Men and women equally affected  Light- <7 drinks/week  Moderate 7-21 drinks /week  Heavy>21 /week  Eliza et al, [facility], vol 68, no 23, 2016  Rare social drinks    6   Coffee and energy drinks  Complex and varied relation, amount of caffeine varies with drinks   Coffee - 4-180 mg/150 ml  Cola - 15-29 mg/180 ml  Tea 24-50mg/150 ml  Cocoa 2-7 mg/150 ml   Common energy drinks -  100-286 mg  Some brand of energy drinks - 550 mg/bottle  Energy drinks - guarana - has guaranine -similar to caffeine - not labelled with total caffeine content     If caffeine known precipitator avoid same   Prefer to avoid energy drinks as unsure of total content of caffeine and other stimulants   YOSVANY Pereira, Aug-Sep 2014, vol 7 , issue 2  Does not use energy drinks     7 - thyroid function   hyperthyroidism is a common precipitator of atrial fibrillation   Check TSH, 2 3 in November 2018        8-Aggressive management of hypertension   108/84  He is on ramipril and metoprolol succinate      9- aggressive management of diabetes mellitus   Fasting blood glucose is 77      10 - other cardiac conditions causing atrial stretch  heart failure -EF is 50%  MV disease -no evidence of significant disease        11- anticoagulation   patient's chads Vasc score is -2 from age and hypertension    he is currently on apixaban     12 - rate control medication   beta-blocker - he is on metoprolol succinate     13- rhythm control medication     the various options available are  Patient is too advanced to consider class 1 C agents  Will prefer not to use amiodarone in lieu of long-term toxicity  A trial of sotalol or dofetilide may be done to check for ability to maintain sinus rhythm    class 1 C agent - flecainide and propafenone   patient will need a stress study to rule out any underlying ischemia before these can be started   flecainide will necessitate use of an additional beta-blocker -   propafenone has intrinsic beta-blocker activity          class 3 agent - Sotalol and dofetilide   both will need hospital admission to monitor first 5 doses over the initial 2 and half days     sotalol has intrinsic beta-blocker properties   dofetilide may need an additional beta-blocker along with it for rate control  Keep potassium between 4 and 4 5   keep magnesium between 2 and 2 5   follow QTC   Follow creatinine          amiodarone   around this is very effective antiarrhythmic but has significantly long term toxicity   Hence certain basic studies are needed at baseline and thereafter at yearly intervals or   -hypo or hyperthyroidism , Check TSH    -can precipitate significant interstitial lung disease and hence DLCO at baseline and thereafter yearly   -long-term use causes cumulative toxicity in the liver- check  CMP   -corneal deposits advice and hence is ophthalmology evaluation         14- ablation   this is the most effective method to achieve and maintain sinus rhythm      paroxysmal atrial fibrillation - 70-80% chance in the 1st year  and falls to 50% by 5 years   persistent atrial fibrillation - 50-60% chance in the 1st year and falls to 30% or less by 5 years      we use a combination of cryo and radiofrequency ablation      there is a 2-5% chance of serious complication which include - bleeding around access site, heart attack , stroke , bleeding around the heart requiring drainage , perforation requiring open heart surgery , phrenic nerve paralysis causing diaphragmatic paralysis, atrial esophageal fistula   we do deployed multiple methods to prevent such complication :  - heparin anticoagulation with ACT between 350 and 400s to prevent stroke   - coronary angiography if we are going to ablate close to any major blood vessel   -access to the pericardial drain if pericardial effusion were to happen   - pressure sensing catheters to reduce excessive pressure and chances of perforation   - esophageal temperature monitoring to reduce chances of injury        ablation is to be considered if rhythm control improves symptoms      15   device  Many patients with atrial fibrillation have sick sinus syndrome  This can present with sinus bradycardia or tachy-geraldo syndrome  Many of them will need an underline pacemaker to allow either rate or rhythm control drugs to be used     Lower heart rate is in the 30s and hence patient may need device if antiarrhythmics a considered          Summary of my recommendation for the patient:  Consider a trial of dofetilide or sotalol to see if he can maintain sinus rhythm  If patient feels better in sinus rhythm, a rhythm control strategy will be tried  If rhythm control strategy is being tried, there is a role for comprehensive ablation-PVI and posterior wall isolation  If there is no symptom improvement with rhythm control, can continue with rate control and anticoagulation' this may necessitate a device placement          2  Hyperlipidemia  Continue with simvastatin      3  Hypertension  Patient is currently on ramipril and metoprolol succinate  Blood pressure is well controlled        4   Long-term anticoagulation  Chads Vasc score is 2 from age and hypertension  He is on apixaban              Summary of my recommendation for the patient  Admit for sotalol or dofetilide initiation  DC cardioversion post initiation  Need to know if rhythm control causes improvement in symptoms

## 2019-11-20 NOTE — TELEPHONE ENCOUNTER
Both Dofetilide and Sotalol are covered without a prior authorization at a retail pharmacy    60 for 30 $10 00

## 2019-11-20 NOTE — LETTER
November 24, 2019     Nancy Boland MD  [address] [address]    Patient: Kwaku Daly   YOB: 1950   Date of Visit: 11/20/2019       Dear Dr Bushra Dodd: Thank you for referring Kwaku Daly to me for evaluation  Below are my notes for this consultation  If you have questions, please do not hesitate to call me  I look forward to following your patient along with you  Sincerely,        Kaylen Kay MD        CC: Trell Renner,   Kwaku Daly  [facility]  Kaylen Kay MD  11/24/2019 10:09 PM  Sign at close encounter                                             Cardiology Consultation     Kwaku Daly  [id number]  1950  [place] [address]    1  Essential hypertension     2  Longstanding persistent atrial fibrillation  Ambulatory referral to Cardiac Electrophysiology    POCT ECG   3  Mixed hyperlipidemia     4   Current use of long term anticoagulation         History of present illness    The patient has been sent to me by Dr Bushra Dodd for management of atrial fibrillation  He has a history of the same for at least last 4-5 years  Previously he used to be paroxysmal  Currently he is in persistent atrial fibrillation for more than 12 months    The patient is a  in a school for over 30 years  He is otherwise extremely active  He can walk for 10 miles without any difficulty    At the current time he is not complaining of anginal like chest pain or chest pressure  No worsening orthopnea or paroxysmal nocturnal dyspnea  No leg swelling    He is not complaining of any significant prolonged palpitation  He does feel it from time to time  His not complaining of presyncope or syncope  He rarely gets orthostatic lightheadedness    He really is not complaining of any exertional intolerance    He is not complaining of snoring, morning fatigue or daytime sleepiness      Patient Active Problem List   Diagnosis    Cardiomyopathy ([place] [address] )    Mixed hyperlipidemia    Essential hypertension    Chronic bilateral low back pain without sciatica    Atrial flutter ([place] [address] )    Neurogenic bladder    Preop examination    Hip arthritis    Current use of long term anticoagulation     Past Medical History:   Diagnosis Date    Atrial fibrillation ([place] [address] )     Benign hypertension     Blood coagulation disorder ([place] [address] )     Hypertriglyceridemia     Lipid metabolism disorder     Neurogenic bladder     Other postprocedural cardiac functional disturbances following cardiac surgery      Social History     Socioeconomic History    Marital status: /Civil Union     Spouse name: Not on file    Number of children: Not on file    Years of education: Not on file    Highest education level: Not on file   Occupational History     Comment: employed   Social Needs    Financial resource strain: Not on file    Food insecurity:     Worry: Not on file     Inability: Not on file    Transportation needs:     Medical: Not on file     Non-medical: Not on file   Tobacco Use    Smoking status: Former Smoker    Smokeless tobacco: Never Used    Tobacco comment: quite in 1972   Substance and Sexual Activity    Alcohol use:  Yes     Alcohol/week: 1 0 standard drinks     Types: 1 Glasses of wine per week     Comment: per night    Drug use: No    Sexual activity: Not on file   Lifestyle    Physical activity:     Days per week: Not on file     Minutes per session: Not on file    Stress: Not on file   Relationships    Social connections:     Talks on phone: Not on file     Gets together: Not on file     Attends [employer] service: Not on file     Active member of club or organization: Not on file     Attends meetings of clubs or organizations: Not on file     Relationship status: Not on file    Intimate partner violence:     Fear of current or ex partner: Not on file Emotionally abused: Not on file     Physically abused: Not on file     Forced sexual activity: Not on file   Other Topics Concern    Not on file   Social History Narrative    Always uses a seatbelt    Daily caffeine consumption, 2-3 servings per day    Seeing a dentist      Family History   Problem Relation Age of Onset    Diabetes Mother     Colon cancer Mother      Past Surgical History:   Procedure Laterality Date    KNEE SURGERY Bilateral     LIPOMA RESECTION         Current Outpatient Medications:     apixaban (ELIQUIS) 5 mg, Take 1 tablet (5 mg total) by mouth 2 (two) times a day, Disp: 60 tablet, Rfl: 11    Cream Base (PCCA LIPODERM BASE) CREA, PCCA Lipoderm Base cream, Disp: , Rfl:     lidocaine (LIDODERM) 5 %, , Disp: , Rfl: 0    metoprolol succinate (TOPROL-XL) 50 mg 24 hr tablet, 1/2  tab daily, Disp: 90 tablet, Rfl: 3    ramipril (ALTACE) 2 5 mg capsule, Take 1 capsule (2 5 mg total) by mouth daily, Disp: 90 capsule, Rfl: 3    simvastatin (ZOCOR) 20 mg tablet, Take 1 tablet (20 mg total) by mouth daily at bedtime, Disp: 90 tablet, Rfl: 3    tamsulosin (FLOMAX) 0 4 mg, Take 0 4 mg by mouth daily with dinner  , Disp: , Rfl:     traMADol (ULTRAM) 50 mg tablet, Take 1 tablet (50 mg total) by mouth every 6 (six) hours as needed for moderate pain, Disp: 30 tablet, Rfl: 0  No Known Allergies  Vitals:    11/20/19 1401   BP: 108/84   BP Location: Left arm   Patient Position: Sitting   Cuff Size: Large   Pulse: 74   Weight: 86 8 kg (191 lb 4 8 oz)   Height: 6' 3 5" (1 918 m)       Labs:  Lab Results   Component Value Date    K 4 1 11/30/2018     11/30/2018    CO2 26 11/30/2018    BUN 16 11/30/2018    CREATININE 0 95 11/30/2018    CALCIUM 9 2 11/30/2018     Lab Results   Component Value Date    TROPONINI <0 02 11/30/2018     Lab Results   Component Value Date    WBC 4 34 11/30/2018    HGB 15 5 11/30/2018    HCT 44 5 11/30/2018    MCV 97 11/30/2018     11/30/2018     No results found for: CHOL, TRIG, HDL, LDLDIRECT  Imaging: No results found  Echo stress study  September 2019  STRESS 2D ECHO RESULTS:   BASELINE: The left ventricle was small  Overall left ventricular systolic function was at the lower limits of normal  Estimated left ventricular ejection fraction was 50 %       PEAK STRESS: This study was inadequate for the diagnostic evaluation of left ventricular regional wall motion  There was an appropriate reduction in left ventricular size  There was an appropriate augmentation in LV function  Nuclear stress study  November 2018  MYOCARDIAL PERFUSION IMAGING:  The image quality was good  Left ventricular size was normal  The TID ratio was 1 0      PERFUSION DEFECTS:  -  There were no perfusion defects      GATED SPECT:  Left ventricular ejection fraction was low normal  LVEF 50%  There was no left ventricular regional abnormality      SUMMARY:  -  ECG conclusions: The stress ECG was negative for ischemia and normal   -  Perfusion imaging: There were no perfusion defects   -  Gated SPECT: Left ventricular ejection fraction was low normal  LVEF 50%  There was no left ventricular regional abnormality      IMPRESSIONS: Normal study  Myocardial perfusion imaging was normal at rest and with stress  Left ventricular systolic function was low normal        Holter  November 2019          Review of Systems:  Review of Systems   All other systems reviewed and are negative  As described in my history of present illness          Physical Exam:  Physical Exam   Constitutional: He is oriented to person, place, and time  He appears well-developed and well-nourished  No distress  Not in any distress at the current time   HENT:   Head: Normocephalic and atraumatic  Right Ear: External ear normal    Left Ear: External ear normal    Nose: Nose normal    Mouth/Throat: Uvula is midline and mucous membranes are normal    Eyes: Pupils are equal, round, and reactive to light   Conjunctivae, EOM and lids are normal  No scleral icterus  No pallor  No cyanosis  No icterus   Neck: Trachea normal and normal range of motion  Neck supple  No JVD present  Carotid bruit is not present  No thyromegaly present  No jugular lymphadenopathy   Cardiovascular: Normal rate, S1 normal, S2 normal, intact distal pulses and normal pulses  An irregularly irregular rhythm present  PMI is not displaced  Exam reveals no gallop, no S3, no S4 and no friction rub  No murmur heard  Pulmonary/Chest: Effort normal and breath sounds normal  No accessory muscle usage  No respiratory distress  He has no decreased breath sounds  He has no wheezes  He has no rhonchi  He has no rales  He exhibits no tenderness  Abdominal: Soft  Normal appearance and bowel sounds are normal  He exhibits no distension and no mass  There is no splenomegaly or hepatomegaly  There is no tenderness  Musculoskeletal: Normal range of motion  He exhibits no edema, tenderness or deformity  Lymphadenopathy:     He has no cervical adenopathy  Neurological: He is alert and oriented to person, place, and time  Facial symmetry is retained  Extraocular movements are retained  Head neck tongue and palate movement are retained and symmetric   Skin: Skin is intact  No abrasion, no lesion and no rash noted  No erythema  Nails show no clubbing  Psychiatric: He has a normal mood and affect  His speech is normal and behavior is normal  Thought content normal    Vitals reviewed  Discussion/Summary:      1   Longstanding persistent atrial fibrillation  We had a very detailed discussion as far as management of atrial fibrillation   my detailed recommendation for this patient are as follows         1 - natural history of disease   atrial fibrillation is a disease of age   prevalence is 1% in the 4th decade , 2-3% by age 72 and 12-13% by age 80   since it increases with age , definitive therapy is indicated         2 - sleep apnea   untreated sleep apnea is the common cause of failure of medication as well as ablation   success rate of paroxysmal atrial fibrillation ablation falls from 80% in the 1st year, to 15% in the 1st year, for untreated severe sleep apnea   the patient has no history of snoring, morning fatigue at times and daytime sleepiness at times  physical examination for short thick neck and crowded posterior pharynx is negative  Low likelihood of IAN       3  Obesity   There is a 4-5% increased risk of AF per unit increase in BMI  Each 5 U increase in BMI raises risk of incident AF by 33%  [facility] 68, no 23,2016  BMI is 23      4  Smoking   Smoking increases incidence of AF by 2 fold  LAURO study , Otis Guzman al, Heart Rhythm 2011  smoker, quit in 1972    5-Alcohol  53% americans consume alcohol regularly  44% drinkers consume > 5 standard drink on a single occasion in last month  1 standard unit alcohol =12 gms of alcohol  12 oz beer = 4 oz wine = 1 oz whisky = 1 unit alcohol  Moderate habitual consumption increases incidence of AF  Men and women equally affected  Light- <7 drinks/week  Moderate 7-21 drinks /week  Heavy>21 /week  Eliza leonard al, [facility], vol 68, no 23, 2016  Rare social drinks    6   Coffee and energy drinks  Complex and varied relation, amount of caffeine varies with drinks   Coffee - 4-180 mg/150 ml  Cola - 15-29 mg/180 ml  Tea 24-50mg/150 ml  Cocoa 2-7 mg/150 ml   Common energy drinks -  100-286 mg  Some brand of energy drinks - 550 mg/bottle  Energy drinks - guarana - has guaranine -similar to caffeine - not labelled with total caffeine content     If caffeine known precipitator avoid same   Prefer to avoid energy drinks as unsure of total content of caffeine and other stimulants   YOSVANY Pereira, Aug-Sep 2014, vol 7 , issue 2  Does not use energy drinks     7 - thyroid function   hyperthyroidism is a common precipitator of atrial fibrillation   Check TSH, 2 3 in November 2018        8-Aggressive management of hypertension 108/84  He is on ramipril and metoprolol succinate      9- aggressive management of diabetes mellitus   Fasting blood glucose is 77      10 - other cardiac conditions causing atrial stretch  heart failure -EF is 50%  MV disease -no evidence of significant disease        11- anticoagulation   patient's chads Vasc score is -2 from age and hypertension    he is currently on apixaban     12 - rate control medication   beta-blocker - he is on metoprolol succinate     13- rhythm control medication      the various options available are  Patient is too advanced to consider class 1 C agents  Will prefer not to use amiodarone in lieu of long-term toxicity  A trial of sotalol or dofetilide may be done to check for ability to maintain sinus rhythm    class 1 C agent - flecainide and propafenone   patient will need a stress study to rule out any underlying ischemia before these can be started   flecainide will necessitate use of an additional beta-blocker -   propafenone has intrinsic beta-blocker activity          class 3 agent - Sotalol and dofetilide   both will need hospital admission to monitor first 5 doses over the initial 2 and half days     sotalol has intrinsic beta-blocker properties   dofetilide may need an additional beta-blocker along with it for rate control  Keep potassium between 4 and 4 5   keep magnesium between 2 and 2 5   follow QTC   Follow creatinine          amiodarone   around this is very effective antiarrhythmic but has significantly long term toxicity   Hence certain basic studies are needed at baseline and thereafter at yearly intervals or   -hypo or hyperthyroidism , Check TSH    -can precipitate significant interstitial lung disease and hence DLCO at baseline and thereafter yearly   -long-term use causes cumulative toxicity in the liver- check  CMP   -corneal deposits advice and hence is ophthalmology evaluation         14- ablation   this is the most effective method to achieve and maintain sinus rhythm      paroxysmal atrial fibrillation - 70-80% chance in the 1st year  and falls to 50% by 5 years   persistent atrial fibrillation - 50-60% chance in the 1st year and falls to 30% or less by 5 years      we use a combination of cryo and radiofrequency ablation      there is a 2-5% chance of serious complication which include - bleeding around access site, heart attack , stroke , bleeding around the heart requiring drainage , perforation requiring open heart surgery , phrenic nerve paralysis causing diaphragmatic paralysis, atrial esophageal fistula   we do deployed multiple methods to prevent such complication :  - heparin anticoagulation with ACT between 350 and 400s to prevent stroke   - coronary angiography if we are going to ablate close to any major blood vessel   -access to the pericardial drain if pericardial effusion were to happen   - pressure sensing catheters to reduce excessive pressure and chances of perforation   - esophageal temperature monitoring to reduce chances of injury         ablation is to be considered if rhythm control improves symptoms      15   device  Many patients with atrial fibrillation have sick sinus syndrome  This can present with sinus bradycardia or tachy-geraldo syndrome  Many of them will need an underline pacemaker to allow either rate or rhythm control drugs to be used     Lower heart rate is in the 30s and hence patient may need device if antiarrhythmics a considered          Summary of my recommendation for the patient:  Consider a trial of dofetilide or sotalol to see if he can maintain sinus rhythm  If patient feels better in sinus rhythm, a rhythm control strategy will be tried  If rhythm control strategy is being tried, there is a role for comprehensive ablation-PVI and posterior wall isolation  If there is no symptom improvement with rhythm control, can continue with rate control and anticoagulation' this may necessitate a device placement          2  Hyperlipidemia  Continue with simvastatin      3  Hypertension  Patient is currently on ramipril and metoprolol succinate  Blood pressure is well controlled        4   Long-term anticoagulation  Chads Vasc score is 2 from age and hypertension  He is on apixaban              Summary of my recommendation for the patient  Admit for sotalol or dofetilide initiation  DC cardioversion post initiation  Need to know if rhythm control causes improvement in symptoms

## 2019-11-24 PROBLEM — Z79.01 CURRENT USE OF LONG TERM ANTICOAGULATION: Status: ACTIVE | Noted: 2019-11-24

## 2020-01-20 ENCOUNTER — TELEPHONE (OUTPATIENT)
Dept: CARDIOLOGY CLINIC | Facility: CLINIC | Age: 70
End: 2020-01-20

## 2020-01-20 NOTE — TELEPHONE ENCOUNTER
Pt called & said that he met w/ Dr Arsen Aguirre as per Dr Guadalupe Quiroga but pt wants a 2nd opinion & someone who is more local  Pt did mention Dr Carlos Olvera but pt wants Dr Lindsey Oates recommendation

## 2020-01-27 DIAGNOSIS — I10 ESSENTIAL HYPERTENSION: ICD-10-CM

## 2020-01-27 RX ORDER — RAMIPRIL 2.5 MG/1
2.5 CAPSULE ORAL DAILY
Qty: 90 CAPSULE | Refills: 3 | Status: SHIPPED | OUTPATIENT
Start: 2020-01-27 | End: 2020-06-15 | Stop reason: ALTCHOICE

## 2020-02-18 ENCOUNTER — TELEPHONE (OUTPATIENT)
Dept: CARDIOLOGY CLINIC | Facility: CLINIC | Age: 70
End: 2020-02-18

## 2020-02-18 NOTE — TELEPHONE ENCOUNTER
Appointment canceled for Josephine Houston ([id number])   Visit Type: OFFICE VISIT SHORT [facility]   Date        Time      Length    Provider                  Department   2/24/2020    3:40 PM  20 mins  Bandar Wu MD      [facility]      Reason for Cancellation: Patient      Patient Comments: My scheduled procedure at [address] [place] [address] is held up in review  Panchito Anguiano was scheduled this past weekend with Dr Vikram Chávez   Since I cannot schedule the procedure (2 1/2 -days in hospital) this weekend, I want to schedule an appointment with Dr Marleen Ahmadi  after the procedure       SPOKE TO PT'S WIFE & PT WILL CALL BACK FOR THE APPT WHEN HE IS READY

## 2020-02-21 ENCOUNTER — TELEPHONE (OUTPATIENT)
Dept: FAMILY MEDICINE CLINIC | Facility: CLINIC | Age: 70
End: 2020-02-21

## 2020-03-05 ENCOUNTER — TELEPHONE (OUTPATIENT)
Dept: CARDIOLOGY CLINIC | Facility: CLINIC | Age: 70
End: 2020-03-05

## 2020-03-12 ENCOUNTER — OFFICE VISIT (OUTPATIENT)
Dept: CARDIOLOGY CLINIC | Facility: CLINIC | Age: 70
End: 2020-03-12
Payer: COMMERCIAL

## 2020-03-12 VITALS
SYSTOLIC BLOOD PRESSURE: 101 MMHG | OXYGEN SATURATION: 99 % | BODY MASS INDEX: 23.62 KG/M2 | DIASTOLIC BLOOD PRESSURE: 70 MMHG | HEART RATE: 69 BPM | WEIGHT: 194 LBS | HEIGHT: 76 IN

## 2020-03-12 DIAGNOSIS — I10 ESSENTIAL HYPERTENSION: ICD-10-CM

## 2020-03-12 DIAGNOSIS — I48.0 PAF (PAROXYSMAL ATRIAL FIBRILLATION) (HCC): Primary | ICD-10-CM

## 2020-03-12 DIAGNOSIS — E78.00 PURE HYPERCHOLESTEROLEMIA: ICD-10-CM

## 2020-03-12 PROCEDURE — 93000 ELECTROCARDIOGRAM COMPLETE: CPT | Performed by: PHYSICIAN ASSISTANT

## 2020-03-12 PROCEDURE — 3008F BODY MASS INDEX DOCD: CPT | Performed by: PHYSICIAN ASSISTANT

## 2020-03-12 PROCEDURE — 3078F DIAST BP <80 MM HG: CPT | Performed by: PHYSICIAN ASSISTANT

## 2020-03-12 PROCEDURE — 99214 OFFICE O/P EST MOD 30 MIN: CPT | Performed by: PHYSICIAN ASSISTANT

## 2020-03-12 PROCEDURE — 1036F TOBACCO NON-USER: CPT | Performed by: PHYSICIAN ASSISTANT

## 2020-03-12 PROCEDURE — 3074F SYST BP LT 130 MM HG: CPT | Performed by: PHYSICIAN ASSISTANT

## 2020-03-12 PROCEDURE — 1160F RVW MEDS BY RX/DR IN RCRD: CPT | Performed by: PHYSICIAN ASSISTANT

## 2020-03-12 RX ORDER — SOTALOL HYDROCHLORIDE 80 MG/1
120 TABLET ORAL 2 TIMES DAILY
COMMUNITY
Start: 2020-03-02 | End: 2022-05-31 | Stop reason: ALTCHOICE

## 2020-03-12 NOTE — PATIENT INSTRUCTIONS
reviewed EKG with the patient  Discussed with Dr Max Martinez over the phone as he previously saw patient, initiated sotalol, had recent cardioversion done with him  Patient will need ablation given he has failed antiarrhythmics  There is no room to go up on sotalol given his blood pressure is systolic we 549  Patient will await call from Dr Max Martinez office to arrange for ablation  Continue all medications at this time  Report any bleeding while on Eliquis  All questions answered  Patient is agreeable with the plan  Continue all medications  Previous studies reviewed with patient, medications reviewed and possible side effects discussed  Continue risk factor modification  Optimize weight, regular exercise and follow up with appropriate specialists and primary care physician as discussed  All questions answered  Patient advised to report any problems prompting to medical attention   Return for follow up visit in 3 months or earlier if needed

## 2020-03-12 NOTE — PROGRESS NOTES
[facility] [address] [address]  Cardiology Follow Up    Laura Leigh  1950  [id number]    1  PAF (paroxysmal atrial fibrillation) (HCC)  POCT ECG   2  Essential hypertension     3  Pure hypercholesterolemia         Discussion/Summary:  1-PAF, s/p sotalol initiation and cardioversion (done at [facility]) back to Sinus that lasted about 1 week per the pt  Patient is interested in going back to Dr Melinda Goode to discuss ablation, spoke with Dr Melinda Goode over the phone who will call the patient to arrange for appointment  continue all medications  Report   Any bleeding while on Eliquis  EKG done in the office today atrial fibrillation, rate 63  2- hypertension, on the lower side blood pressure 101/70  On sotalol 80 bid    3- hyperlipidemia on Zocor 20mg     reviewed EKG with the patient  Reviewed records from [facility], patient was admitted, started on sotalol, remained in AFib after 2 days of sotalol, had cardioversion and converted back to sinus rhythm  Discussed with Dr Melinda Goode over the phone as he previously saw patient, initiated sotalol, had recent cardioversion done with him  Patient will need ablation given he has failed antiarrhythmics  There is no room to go up on sotalol given his blood pressure is systolic we 807  Patient will await call from Dr Melinda Goode office to arrange for ablation  Continue all medications at this time  Report any bleeding while on Eliquis  All questions answered  Patient is agreeable with the plan  Continue all medications  Previous studies reviewed with patient, medications reviewed and possible side effects discussed  Continue risk factor modification  Optimize weight, regular exercise and follow up with appropriate specialists and primary care physician as discussed  All questions answered  Patient advised to report any problems prompting to medical attention   Return for follow up visit in 3 months or earlier if needed    Chief Complaint   Patient presents with   [address]       Interval History:   Patient presents to the office today for follow-up visit after recent hospitalization at [facility]  Patient was hospitalized for initiation of sotalol  Patient remained in atrial fibrillation after 2 days of sotalol and was scheduled for cardioversion  Patient had cardioversion with 1 shock back to sinus rhythm  Patient was then discharged  Patient states he remained in  Sinus rhythm for about 1 week when he converted back to atrial fibrillation this past Sunday  Patient states he has been in atrial fibrillation since then  He denies any chest pain, chest pressure, chest heaviness  He denies any shortness of breath, palpitations  He admitted to dizziness after initiation of sotalol with position change but this has subsided  Patient denies any bleeding troubles with Eliquis  Patient wonders what the next step is in regards to treating atrial fibrillation       Patient Active Problem List   Diagnosis    Cardiomyopathy ([facility] [address] [facility]    Mixed hyperlipidemia    Essential hypertension    Chronic bilateral low back pain without sciatica    Atrial flutter (HCC)    Neurogenic bladder    Preop examination    Hip arthritis    Current use of long term anticoagulation     Past Medical History:   Diagnosis Date    Atrial fibrillation ([facility] [address] )     Benign hypertension     Blood coagulation disorder (HCC)     Hypertriglyceridemia     Lipid metabolism disorder     Neurogenic bladder     Other postprocedural cardiac functional disturbances following cardiac surgery      Social History     Socioeconomic History    Marital status: /Civil Union     Spouse name: Not on file    Number of children: Not on file    Years of education: Not on file    Highest education level: Not on file   Occupational History     Comment: employed   Social Needs    Financial resource strain: Not on file   [place] insecurity:     Worry: Not on file     Inability: Not on file    Transportation needs:     Medical: Not on file     Non-medical: Not on file   Tobacco Use    Smoking status: Former Smoker    Smokeless tobacco: Never Used    Tobacco comment: quite in 0   Substance and Sexual Activity    Alcohol use:  Yes     Alcohol/week: 1 0 standard drinks     Types: 1 Glasses of wine per week     Comment: per night    Drug use: No    Sexual activity: Not on file   Lifestyle    Physical activity:     Days per week: Not on file     Minutes per session: Not on file    Stress: Not on file   Relationships    Social connections:     Talks on phone: Not on file     Gets together: Not on file     Attends [employer] service: Not on file     Active member of club or organization: Not on file     Attends meetings of clubs or organizations: Not on file     Relationship status: Not on file    Intimate partner violence:     Fear of current or ex partner: Not on file     Emotionally abused: Not on file     Physically abused: Not on file     Forced sexual activity: Not on file   Other Topics Concern    Not on file   Social History Narrative    Always uses a seatbelt    Daily caffeine consumption, 2-3 servings per day    Seeing a dentist      Family History   Problem Relation Age of Onset    Diabetes Mother     Colon cancer Mother      Past Surgical History:   Procedure Laterality Date    KNEE SURGERY Bilateral     LIPOMA RESECTION         Current Outpatient Medications:     apixaban (ELIQUIS) 5 mg, Take 1 tablet (5 mg total) by mouth 2 (two) times a day, Disp: 60 tablet, Rfl: 11    ramipril (ALTACE) 2 5 mg capsule, Take 1 capsule (2 5 mg total) by mouth daily, Disp: 90 capsule, Rfl: 3    simvastatin (ZOCOR) 20 mg tablet, Take 1 tablet (20 mg total) by mouth daily at bedtime, Disp: 90 tablet, Rfl: 3    sotalol (BETAPACE) 80 mg tablet, sotalol 80 mg tablet, Disp: , Rfl:     tamsulosin (FLOMAX) 0 4 mg, Take 0 4 mg by mouth daily with dinner  , Disp: , Rfl:     traMADol (ULTRAM) 50 mg tablet, Take 1 tablet (50 mg total) by mouth every 6 (six) hours as needed for moderate pain, Disp: 30 tablet, Rfl: 0    Cream Base (PCCA LIPODERM BASE) CREA, PCCA Lipoderm Base cream, Disp: , Rfl:     lidocaine (LIDODERM) 5 %, , Disp: , Rfl: 0  No Known Allergies      Imaging: No results found  Review of Systems:  Review of Systems   Constitutional: Negative  Respiratory: Negative  Negative for shortness of breath  Cardiovascular: Positive for palpitations  Negative for chest pain and leg swelling  Gastrointestinal: Negative  Musculoskeletal: Negative  Neurological: Negative  Negative for dizziness, syncope, weakness and light-headedness  Hematological: Negative  Does not bruise/bleed easily  Psychiatric/Behavioral: Negative  All other systems reviewed and are negative  EKG: Atrial fibrillation, rate 63, , RAD, cannot rule lateral infarct  /70 (BP Location: Left arm, Patient Position: Sitting, Cuff Size: Adult)   Pulse 69   Ht 6' 3 5" (1 918 m)   Wt 88 kg (194 lb)   SpO2 99%   BMI 23 93 kg/m²     Physical Exam:  Physical Exam   Constitutional: He is oriented to person, place, and time  He appears well-developed and well-nourished  HENT:   Head: Normocephalic and atraumatic  Eyes: Pupils are equal, round, and reactive to light  EOM are normal    Neck: Normal range of motion  Neck supple  Cardiovascular: Normal rate and normal heart sounds  +irregularly irregular   Pulmonary/Chest: Effort normal and breath sounds normal    Musculoskeletal: Normal range of motion  Neurological: He is alert and oriented to person, place, and time  Skin: Skin is warm and dry  Psychiatric: He has a normal mood and affect  His behavior is normal  Judgment and thought content normal    Nursing note and vitals reviewed

## 2020-03-16 ENCOUNTER — TELEMEDICINE (OUTPATIENT)
Dept: PULMONOLOGY | Facility: CLINIC | Age: 70
End: 2020-03-16

## 2020-03-16 DIAGNOSIS — R09.82 POSTNASAL DRIP: ICD-10-CM

## 2020-03-16 DIAGNOSIS — R05.3 CHRONIC COUGH: Primary | ICD-10-CM

## 2020-03-16 DIAGNOSIS — J45.991 COUGH VARIANT ASTHMA: ICD-10-CM

## 2020-03-16 PROCEDURE — 99213 OFFICE O/P EST LOW 20 MIN: CPT | Performed by: INTERNAL MEDICINE

## 2020-03-16 NOTE — PROGRESS NOTES
Virtual Check-in Visit    Reason for visit is chronic cough, new patient consult    This virtual check-in was done via telephone  Encounter provider Ubaldo Baird MD    Provider located at [address] [address]      Recent Visits  No visits were found meeting these conditions  Showing recent visits within past 7 days and meeting all other requirements     Future Appointments  No visits were found meeting these conditions  Showing future appointments within next 150 days and meeting all other requirements        Patient agrees to participate in a virtual check in via telephone or video visit instead of presenting to the office to address urgent/immediate medical needs  Patient is aware this is a billable service but at a lesser fee than in person care  After connecting through telephone, the patient was identified by name and date of birth  Bobbi Mistry was informed that this was a telemedicine visit and that the exam was being conducted confidentially over secure lines  My office door was closed  No one else was in the room  He acknowledged consent and understanding of privacy and security of the virtual check-in visit  I informed the patient that I have reviewed his record in Epic and presented the opportunity for him to ask any questions regarding the visit today  The patient initiated communication and agreed to participate  Subjective  Bobbi Mistry is a 81 y o  male who has never smoked with history of hypertension atrial fibrillation and history of obstructive sleep apnea on CPAP with history of cough chronic for more than 2 years which has recently gotten worse he states that is mainly dry nonproductive most of the time    Also has significant postnasal drip      Past Medical History:   Diagnosis Date    Atrial fibrillation ([facility] [facility] )     Benign hypertension     Blood coagulation disorder ([facility] [address] [facility] )  Hypertriglyceridemia     Lipid metabolism disorder     Neurogenic bladder     Other postprocedural cardiac functional disturbances following cardiac surgery        Past Surgical History:   Procedure Laterality Date    KNEE SURGERY Bilateral     LIPOMA RESECTION         Current Outpatient Medications   Medication Sig Dispense Refill    apixaban (ELIQUIS) 5 mg Take 1 tablet (5 mg total) by mouth 2 (two) times a day 60 tablet 11    Cream Base (PCCA LIPODERM BASE) CREA PCCA Lipoderm Base cream      lidocaine (LIDODERM) 5 %   0    ramipril (ALTACE) 2 5 mg capsule Take 1 capsule (2 5 mg total) by mouth daily 90 capsule 3    simvastatin (ZOCOR) 20 mg tablet Take 1 tablet (20 mg total) by mouth daily at bedtime 90 tablet 3    sotalol (BETAPACE) 80 mg tablet sotalol 80 mg tablet      tamsulosin (FLOMAX) 0 4 mg Take 0 4 mg by mouth daily with dinner        traMADol (ULTRAM) 50 mg tablet Take 1 tablet (50 mg total) by mouth every 6 (six) hours as needed for moderate pain 30 tablet 0     No current facility-administered medications for this visit  No Known Allergies    Assessment    Benjy telephone assessment is Stable pulmonary stand   Chronic cough likely secondary to cough variant asthma and environmental allergens and postnasal drip  No history of any acid reflux or GERD  Also discussed his cough can be secondary to ramipril he is on and the ACE inhibitor  Will get respiratory allergy panel with IgE and hypersensitivity pneumonitis profile  PFT with bronchodilator lung volumes and DLCO  CT of the chest done in 2019 with a small subcentimeter 3 millimeter lung nodule, discussed various etiologies for the subcentimeter lung nodule given no prior history of smoking, the subcentimeter lung nodule does not require any further follow-up    IAN on CPAP discussed given underlying atrial fibrillation the need for compliance understands and verbalizes  Cleaning of the supplies change of CPAP supplies discussed      Disposition:    Follow-up in 6-8 weeks or p r n  earlier as needed  I spent  20  minutes with the patient during this virtual check-in visit

## 2020-03-20 ENCOUNTER — APPOINTMENT (OUTPATIENT)
Dept: LAB | Facility: HOSPITAL | Age: 70
End: 2020-03-20
Attending: INTERNAL MEDICINE
Payer: COMMERCIAL

## 2020-03-20 ENCOUNTER — HOSPITAL ENCOUNTER (OUTPATIENT)
Dept: PULMONOLOGY | Facility: HOSPITAL | Age: 70
Discharge: HOME | End: 2020-03-20
Attending: INTERNAL MEDICINE
Payer: COMMERCIAL

## 2020-03-20 DIAGNOSIS — R05.9 COUGH: ICD-10-CM

## 2020-03-20 LAB
ALBUMIN SERPL BCP-MCNC: 3.9 G/DL (ref 3.5–5)
ALP SERPL-CCNC: 100 U/L (ref 46–116)
ALT SERPL W P-5'-P-CCNC: 49 U/L (ref 12–78)
ANION GAP SERPL CALCULATED.3IONS-SCNC: 7 MMOL/L (ref 4–13)
AST SERPL W P-5'-P-CCNC: 30 U/L (ref 5–45)
BASOPHILS # BLD AUTO: 0.02 THOUSANDS/ΜL (ref 0–0.1)
BASOPHILS NFR BLD AUTO: 0 % (ref 0–1)
BILIRUB SERPL-MCNC: 0.8 MG/DL (ref 0.2–1)
BUN SERPL-MCNC: 17 MG/DL (ref 5–25)
CALCIUM SERPL-MCNC: 9.5 MG/DL (ref 8.3–10.1)
CHLORIDE SERPL-SCNC: 104 MMOL/L (ref 100–108)
CHOLEST SERPL-MCNC: 148 MG/DL (ref 50–200)
CO2 SERPL-SCNC: 29 MMOL/L (ref 21–32)
CREAT SERPL-MCNC: 1.15 MG/DL (ref 0.6–1.3)
EOSINOPHIL # BLD AUTO: 0.1 THOUSAND/ΜL (ref 0–0.61)
EOSINOPHIL NFR BLD AUTO: 2 % (ref 0–6)
ERYTHROCYTE [DISTWIDTH] IN BLOOD BY AUTOMATED COUNT: 12.2 % (ref 11.6–15.1)
GFR SERPL CREATININE-BSD FRML MDRD: 65 ML/MIN/1.73SQ M
GLUCOSE P FAST SERPL-MCNC: 95 MG/DL (ref 65–99)
HCT VFR BLD AUTO: 48.3 % (ref 36.5–49.3)
HDLC SERPL-MCNC: 53 MG/DL
HGB BLD-MCNC: 16.4 G/DL (ref 12–17)
IMM GRANULOCYTES # BLD AUTO: 0.02 THOUSAND/UL (ref 0–0.2)
IMM GRANULOCYTES NFR BLD AUTO: 0 % (ref 0–2)
LDLC SERPL CALC-MCNC: 69 MG/DL (ref 0–100)
LYMPHOCYTES # BLD AUTO: 1.5 THOUSANDS/ΜL (ref 0.6–4.47)
LYMPHOCYTES NFR BLD AUTO: 23 % (ref 14–44)
MCH RBC QN AUTO: 33.4 PG (ref 26.8–34.3)
MCHC RBC AUTO-ENTMCNC: 34 G/DL (ref 31.4–37.4)
MCV RBC AUTO: 98 FL (ref 82–98)
MONOCYTES # BLD AUTO: 0.5 THOUSAND/ΜL (ref 0.17–1.22)
MONOCYTES NFR BLD AUTO: 8 % (ref 4–12)
NEUTROPHILS # BLD AUTO: 4.31 THOUSANDS/ΜL (ref 1.85–7.62)
NEUTS SEG NFR BLD AUTO: 67 % (ref 43–75)
NONHDLC SERPL-MCNC: 95 MG/DL
NRBC BLD AUTO-RTO: 0 /100 WBCS
PLATELET # BLD AUTO: 150 THOUSANDS/UL (ref 149–390)
PMV BLD AUTO: 11.1 FL (ref 8.9–12.7)
POTASSIUM SERPL-SCNC: 4.6 MMOL/L (ref 3.5–5.3)
PROT SERPL-MCNC: 6.9 G/DL (ref 6.4–8.2)
RBC # BLD AUTO: 4.91 MILLION/UL (ref 3.88–5.62)
SODIUM SERPL-SCNC: 140 MMOL/L (ref 136–145)
TRIGL SERPL-MCNC: 132 MG/DL
WBC # BLD AUTO: 6.45 THOUSAND/UL (ref 4.31–10.16)

## 2020-03-20 PROCEDURE — 86671 FUNGUS NES ANTIBODY: CPT

## 2020-03-20 PROCEDURE — 85025 COMPLETE CBC W/AUTO DIFF WBC: CPT | Performed by: INTERNAL MEDICINE

## 2020-03-20 PROCEDURE — 94060 EVALUATION OF WHEEZING: CPT

## 2020-03-20 PROCEDURE — 86003 ALLG SPEC IGE CRUDE XTRC EA: CPT

## 2020-03-20 PROCEDURE — 80053 COMPREHEN METABOLIC PANEL: CPT | Performed by: INTERNAL MEDICINE

## 2020-03-20 PROCEDURE — 86331 IMMUNODIFFUSION OUCHTERLONY: CPT

## 2020-03-20 PROCEDURE — 36415 COLL VENOUS BLD VENIPUNCTURE: CPT | Performed by: INTERNAL MEDICINE

## 2020-03-20 PROCEDURE — 82785 ASSAY OF IGE: CPT

## 2020-03-20 PROCEDURE — 94729 DIFFUSING CAPACITY: CPT | Performed by: INTERNAL MEDICINE

## 2020-03-20 PROCEDURE — 94760 N-INVAS EAR/PLS OXIMETRY 1: CPT

## 2020-03-20 PROCEDURE — 80061 LIPID PANEL: CPT | Performed by: INTERNAL MEDICINE

## 2020-03-20 PROCEDURE — 86606 ASPERGILLUS ANTIBODY: CPT

## 2020-03-20 PROCEDURE — 94727 GAS DIL/WSHOT DETER LNG VOL: CPT | Performed by: INTERNAL MEDICINE

## 2020-03-20 PROCEDURE — 94727 GAS DIL/WSHOT DETER LNG VOL: CPT

## 2020-03-20 PROCEDURE — 86602 ANTINOMYCES ANTIBODY: CPT

## 2020-03-20 PROCEDURE — 94060 EVALUATION OF WHEEZING: CPT | Performed by: INTERNAL MEDICINE

## 2020-03-20 PROCEDURE — 94729 DIFFUSING CAPACITY: CPT

## 2020-03-20 RX ORDER — ALBUTEROL SULFATE 2.5 MG/3ML
2.5 SOLUTION RESPIRATORY (INHALATION) ONCE
Status: COMPLETED | OUTPATIENT
Start: 2020-03-20 | End: 2020-03-20

## 2020-03-20 RX ADMIN — ALBUTEROL SULFATE 2.5 MG: 2.5 SOLUTION RESPIRATORY (INHALATION) at 07:34

## 2020-03-24 LAB
A ALTERNATA IGE QN: <0.1 KUA/I
A FUMIGATUS IGE QN: <0.1 KUA/I
ALLERGEN COMMENT: NORMAL
BERMUDA GRASS IGE QN: <0.1 KUA/I
BOXELDER IGE QN: <0.1 KUA/I
C HERBARUM IGE QN: <0.1 KUA/I
CAT DANDER IGE QN: <0.1 KUA/I
CMN PIGWEED IGE QN: <0.1 KUA/I
COMMON RAGWEED IGE QN: <0.1 KUA/I
COTTONWOOD IGE QN: <0.1 KUA/I
D FARINAE IGE QN: <0.1 KUA/I
D PTERONYSS IGE QN: <0.1 KUA/I
DOG DANDER IGE QN: <0.1 KUA/I
LONDON PLANE IGE QN: <0.1 KUA/I
MOUSE URINE PROT IGE QN: <0.1 KUA/I
MT JUNIPER IGE QN: <0.1 KUA/I
MUGWORT IGE QN: <0.1 KUA/I
P NOTATUM IGE QN: <0.1 KUA/I
ROACH IGE QN: <0.1 KUA/I
SHEEP SORREL IGE QN: <0.1 KUA/I
SILVER BIRCH IGE QN: <0.1 KUA/I
TIMOTHY IGE QN: <0.1 KUA/I
TOTAL IGE SMQN RAST: 23.5 KU/L (ref 0–113)
WALNUT IGE QN: <0.1 KUA/I
WHITE ASH IGE QN: <0.1 KUA/I
WHITE ELM IGE QN: <0.1 KUA/I
WHITE MULBERRY IGE QN: <0.1 KUA/I
WHITE OAK IGE QN: <0.1 KUA/I

## 2020-03-25 LAB
A FUMIGATUS1 AB SER QL ID: NEGATIVE
A PULLULANS AB SER QL: NEGATIVE
LACEYELLA SACCHARI AB SER QL: NEGATIVE
PIGEON SERUM AB QL ID: NEGATIVE
S RECTIVIRGULA AB SER QL ID: NEGATIVE
T VULGARIS AB SER QL ID: NEGATIVE

## 2020-04-01 ENCOUNTER — TELEPHONE (OUTPATIENT)
Dept: PULMONOLOGY | Facility: CLINIC | Age: 70
End: 2020-04-01

## 2020-04-03 ENCOUNTER — TELEPHONE (OUTPATIENT)
Dept: PULMONOLOGY | Facility: CLINIC | Age: 70
End: 2020-04-03

## 2020-04-20 ENCOUNTER — TELEPHONE (OUTPATIENT)
Dept: FAMILY MEDICINE CLINIC | Facility: CLINIC | Age: 70
End: 2020-04-20

## 2020-04-20 ENCOUNTER — TELEMEDICINE (OUTPATIENT)
Dept: FAMILY MEDICINE CLINIC | Facility: CLINIC | Age: 70
End: 2020-04-20
Payer: COMMERCIAL

## 2020-04-20 VITALS — HEIGHT: 76 IN | BODY MASS INDEX: 23.93 KG/M2

## 2020-04-20 DIAGNOSIS — R05.9 COUGH: ICD-10-CM

## 2020-04-20 DIAGNOSIS — J01.00 ACUTE MAXILLARY SINUSITIS, RECURRENCE NOT SPECIFIED: Primary | ICD-10-CM

## 2020-04-20 PROBLEM — Z96.649 HISTORY OF TOTAL HIP ARTHROPLASTY: Status: ACTIVE | Noted: 2019-02-14

## 2020-04-20 PROCEDURE — 1160F RVW MEDS BY RX/DR IN RCRD: CPT | Performed by: PHYSICIAN ASSISTANT

## 2020-04-20 PROCEDURE — 99214 OFFICE O/P EST MOD 30 MIN: CPT | Performed by: PHYSICIAN ASSISTANT

## 2020-04-20 RX ORDER — CEFUROXIME AXETIL 250 MG/1
250 TABLET ORAL EVERY 12 HOURS SCHEDULED
Qty: 14 TABLET | Refills: 0 | Status: SHIPPED | OUTPATIENT
Start: 2020-04-20 | End: 2020-04-27

## 2020-04-20 RX ORDER — PROMETHAZINE HYDROCHLORIDE AND CODEINE PHOSPHATE 6.25; 1 MG/5ML; MG/5ML
5 SYRUP ORAL EVERY 4 HOURS PRN
Qty: 120 ML | Refills: 0 | Status: SHIPPED | OUTPATIENT
Start: 2020-04-20 | End: 2020-04-28 | Stop reason: SDUPTHER

## 2020-04-22 ENCOUNTER — TELEPHONE (OUTPATIENT)
Dept: FAMILY MEDICINE CLINIC | Facility: CLINIC | Age: 70
End: 2020-04-22

## 2020-04-22 DIAGNOSIS — R05.9 COUGH: ICD-10-CM

## 2020-04-22 DIAGNOSIS — R50.9 FEVER, UNSPECIFIED FEVER CAUSE: Primary | ICD-10-CM

## 2020-04-22 DIAGNOSIS — R50.9 FEVER, UNSPECIFIED FEVER CAUSE: ICD-10-CM

## 2020-04-22 PROCEDURE — 87635 SARS-COV-2 COVID-19 AMP PRB: CPT

## 2020-04-23 LAB — SARS-COV-2 RNA SPEC QL NAA+PROBE: NOT DETECTED

## 2020-04-24 ENCOUNTER — TELEPHONE (OUTPATIENT)
Dept: FAMILY MEDICINE CLINIC | Facility: CLINIC | Age: 70
End: 2020-04-24

## 2020-04-24 DIAGNOSIS — R05.9 COUGH: Primary | ICD-10-CM

## 2020-04-24 RX ORDER — BENZONATATE 200 MG/1
200 CAPSULE ORAL 3 TIMES DAILY PRN
Qty: 20 CAPSULE | Refills: 0 | Status: SHIPPED | OUTPATIENT
Start: 2020-04-24 | End: 2020-04-28 | Stop reason: SDUPTHER

## 2020-04-27 ENCOUNTER — TELEPHONE (OUTPATIENT)
Dept: FAMILY MEDICINE CLINIC | Facility: CLINIC | Age: 70
End: 2020-04-27

## 2020-04-28 DIAGNOSIS — R05.9 COUGH: Primary | ICD-10-CM

## 2020-04-28 DIAGNOSIS — J01.00 ACUTE MAXILLARY SINUSITIS, RECURRENCE NOT SPECIFIED: ICD-10-CM

## 2020-04-28 DIAGNOSIS — R05.9 COUGH: ICD-10-CM

## 2020-04-28 RX ORDER — BENZONATATE 200 MG/1
200 CAPSULE ORAL 3 TIMES DAILY PRN
Qty: 20 CAPSULE | Refills: 0 | Status: SHIPPED | OUTPATIENT
Start: 2020-04-28 | End: 2020-05-08 | Stop reason: SDUPTHER

## 2020-04-28 RX ORDER — PROMETHAZINE HYDROCHLORIDE AND CODEINE PHOSPHATE 6.25; 1 MG/5ML; MG/5ML
5 SYRUP ORAL EVERY 4 HOURS PRN
Qty: 120 ML | Refills: 0 | Status: SHIPPED | OUTPATIENT
Start: 2020-04-28 | End: 2020-05-15 | Stop reason: SDUPTHER

## 2020-05-01 ENCOUNTER — APPOINTMENT (OUTPATIENT)
Dept: RADIOLOGY | Facility: CLINIC | Age: 70
End: 2020-05-01
Payer: COMMERCIAL

## 2020-05-01 ENCOUNTER — TELEPHONE (OUTPATIENT)
Dept: FAMILY MEDICINE CLINIC | Facility: CLINIC | Age: 70
End: 2020-05-01

## 2020-05-01 DIAGNOSIS — R05.9 COUGH: ICD-10-CM

## 2020-05-01 PROCEDURE — 71046 X-RAY EXAM CHEST 2 VIEWS: CPT

## 2020-05-08 DIAGNOSIS — R05.9 COUGH: ICD-10-CM

## 2020-05-08 RX ORDER — BENZONATATE 200 MG/1
200 CAPSULE ORAL 3 TIMES DAILY PRN
Qty: 20 CAPSULE | Refills: 0 | Status: SHIPPED | OUTPATIENT
Start: 2020-05-08 | End: 2020-05-15 | Stop reason: SDUPTHER

## 2020-05-11 ENCOUNTER — TELEPHONE (OUTPATIENT)
Dept: FAMILY MEDICINE CLINIC | Facility: CLINIC | Age: 70
End: 2020-05-11

## 2020-05-12 ENCOUNTER — OFFICE VISIT (OUTPATIENT)
Dept: FAMILY MEDICINE CLINIC | Facility: CLINIC | Age: 70
End: 2020-05-12
Payer: COMMERCIAL

## 2020-05-12 VITALS
TEMPERATURE: 98.2 F | HEART RATE: 82 BPM | BODY MASS INDEX: 24.6 KG/M2 | SYSTOLIC BLOOD PRESSURE: 120 MMHG | WEIGHT: 202 LBS | OXYGEN SATURATION: 98 % | DIASTOLIC BLOOD PRESSURE: 78 MMHG | HEIGHT: 76 IN

## 2020-05-12 DIAGNOSIS — R05.9 COUGH: Primary | ICD-10-CM

## 2020-05-12 PROBLEM — J01.00 ACUTE MAXILLARY SINUSITIS: Status: RESOLVED | Noted: 2020-04-20 | Resolved: 2020-05-12

## 2020-05-12 PROCEDURE — 3288F FALL RISK ASSESSMENT DOCD: CPT | Performed by: PHYSICIAN ASSISTANT

## 2020-05-12 PROCEDURE — 3078F DIAST BP <80 MM HG: CPT | Performed by: PHYSICIAN ASSISTANT

## 2020-05-12 PROCEDURE — 1036F TOBACCO NON-USER: CPT | Performed by: PHYSICIAN ASSISTANT

## 2020-05-12 PROCEDURE — 1160F RVW MEDS BY RX/DR IN RCRD: CPT | Performed by: PHYSICIAN ASSISTANT

## 2020-05-12 PROCEDURE — 99213 OFFICE O/P EST LOW 20 MIN: CPT | Performed by: PHYSICIAN ASSISTANT

## 2020-05-12 PROCEDURE — 1101F PT FALLS ASSESS-DOCD LE1/YR: CPT | Performed by: PHYSICIAN ASSISTANT

## 2020-05-12 PROCEDURE — 3074F SYST BP LT 130 MM HG: CPT | Performed by: PHYSICIAN ASSISTANT

## 2020-05-12 PROCEDURE — 3008F BODY MASS INDEX DOCD: CPT | Performed by: PHYSICIAN ASSISTANT

## 2020-05-12 RX ORDER — AZELASTINE 1 MG/ML
2 SPRAY, METERED NASAL 2 TIMES DAILY
Qty: 1 BOTTLE | Refills: 1 | Status: SHIPPED | OUTPATIENT
Start: 2020-05-12 | End: 2021-05-27

## 2020-05-15 DIAGNOSIS — R05.9 COUGH: ICD-10-CM

## 2020-05-15 DIAGNOSIS — J01.00 ACUTE MAXILLARY SINUSITIS, RECURRENCE NOT SPECIFIED: ICD-10-CM

## 2020-05-18 DIAGNOSIS — E78.2 MIXED HYPERLIPIDEMIA: ICD-10-CM

## 2020-05-18 RX ORDER — BENZONATATE 200 MG/1
200 CAPSULE ORAL 3 TIMES DAILY PRN
Qty: 20 CAPSULE | Refills: 0 | Status: SHIPPED | OUTPATIENT
Start: 2020-05-18 | End: 2022-05-11

## 2020-05-18 RX ORDER — SIMVASTATIN 20 MG
20 TABLET ORAL
Qty: 90 TABLET | Refills: 3 | Status: SHIPPED | OUTPATIENT
Start: 2020-05-18 | End: 2021-05-07 | Stop reason: SDUPTHER

## 2020-05-18 RX ORDER — PROMETHAZINE HYDROCHLORIDE AND CODEINE PHOSPHATE 6.25; 1 MG/5ML; MG/5ML
5 SYRUP ORAL EVERY 4 HOURS PRN
Qty: 120 ML | Refills: 0 | Status: SHIPPED | OUTPATIENT
Start: 2020-05-18 | End: 2021-10-15

## 2020-06-15 ENCOUNTER — OFFICE VISIT (OUTPATIENT)
Dept: CARDIOLOGY CLINIC | Facility: CLINIC | Age: 70
End: 2020-06-15
Payer: COMMERCIAL

## 2020-06-15 VITALS
DIASTOLIC BLOOD PRESSURE: 76 MMHG | SYSTOLIC BLOOD PRESSURE: 112 MMHG | HEART RATE: 86 BPM | BODY MASS INDEX: 23.14 KG/M2 | OXYGEN SATURATION: 97 % | HEIGHT: 76 IN | WEIGHT: 190 LBS

## 2020-06-15 DIAGNOSIS — Z79.01 CURRENT USE OF LONG TERM ANTICOAGULATION: ICD-10-CM

## 2020-06-15 DIAGNOSIS — I48.0 PAF (PAROXYSMAL ATRIAL FIBRILLATION) (HCC): Primary | ICD-10-CM

## 2020-06-15 DIAGNOSIS — I10 ESSENTIAL HYPERTENSION: ICD-10-CM

## 2020-06-15 DIAGNOSIS — I71.2 THORACIC AORTIC ANEURYSM WITHOUT RUPTURE (HCC): ICD-10-CM

## 2020-06-15 PROCEDURE — 3078F DIAST BP <80 MM HG: CPT | Performed by: INTERNAL MEDICINE

## 2020-06-15 PROCEDURE — 93000 ELECTROCARDIOGRAM COMPLETE: CPT | Performed by: INTERNAL MEDICINE

## 2020-06-15 PROCEDURE — 99214 OFFICE O/P EST MOD 30 MIN: CPT | Performed by: INTERNAL MEDICINE

## 2020-06-15 PROCEDURE — 1160F RVW MEDS BY RX/DR IN RCRD: CPT | Performed by: INTERNAL MEDICINE

## 2020-06-15 PROCEDURE — 3074F SYST BP LT 130 MM HG: CPT | Performed by: INTERNAL MEDICINE

## 2020-06-15 PROCEDURE — 1036F TOBACCO NON-USER: CPT | Performed by: INTERNAL MEDICINE

## 2020-06-15 PROCEDURE — 3008F BODY MASS INDEX DOCD: CPT | Performed by: INTERNAL MEDICINE

## 2020-08-31 DIAGNOSIS — I48.91 NEW ONSET A-FIB (HCC): ICD-10-CM

## 2021-02-01 ENCOUNTER — TELEMEDICINE (OUTPATIENT)
Dept: CARDIOLOGY CLINIC | Facility: CLINIC | Age: 71
End: 2021-02-01
Payer: COMMERCIAL

## 2021-02-01 DIAGNOSIS — Z79.01 CURRENT USE OF LONG TERM ANTICOAGULATION: ICD-10-CM

## 2021-02-01 DIAGNOSIS — I10 ESSENTIAL HYPERTENSION: ICD-10-CM

## 2021-02-01 DIAGNOSIS — I71.2 THORACIC AORTIC ANEURYSM WITHOUT RUPTURE (HCC): ICD-10-CM

## 2021-02-01 DIAGNOSIS — E78.2 MIXED HYPERLIPIDEMIA: Primary | ICD-10-CM

## 2021-02-01 PROCEDURE — 99213 OFFICE O/P EST LOW 20 MIN: CPT | Performed by: INTERNAL MEDICINE

## 2021-02-01 NOTE — PROGRESS NOTES
Virtual Regular Visit      Assessment/Plan:    Patient with multiple medical problems who seems to be doing reasonably well from cardiac standpoint  Previous studies reviewed with patient  Medications reviewed and possible side effects discussed  concepts of cardiovascular disease , signs and symptoms of heart disease  Dietary and risk factor modification reinforced  All questions answered  Safety measures reviewed  Patient advised to report any problems prompting medical attention  Patient is maintaining sinus rhythm  Patient does have an Apple watch and he checks it frequently  Patient was evaluated by electrophysiology a few months ago  Patient understands the risks and benefits of anticoagulation  Medications reviewed  Symptoms to watch out from cardiac standpoint discussed  Patient has history of mildly dilated thoracic aorta and we will reimage  The end of this year  Risks and benefits  and alternativesof anticoagulation to prevent thromboembolic risk from atrial fibrillation discussed at length  Patient to report any bleeding issues  Spent a total of 25 minutes during this tele visit  This included chart preparation and review of cardiovascular studies as well as lab/imaging studies when applicable  50% of the time was spent in counseling and coordination of care  Problem List Items Addressed This Visit     None               Reason for visit is   Chief Complaint   Patient presents with    Virtual Regular Visit        Encounter provider Rafael Loera MD    Provider located at [address] [place] [id number]      Recent Visits  No visits were found meeting these conditions  Showing recent visits within past 7 days and meeting all other requirements     Future Appointments  No visits were found meeting these conditions     Showing future appointments within next 150 days and meeting all other requirements        The patient was identified by name and date of birth  Sharon Lowery was informed that this is a telemedicine visit and that the visit is being conducted through [facility] and patient was informed that this is not a secure, HIPAA-compliant platform  He agrees to proceed     My office door was closed  No one else was in the room  He acknowledged consent and understanding of privacy and security of the video platform  The patient has agreed to participate and understands they can discontinue the visit at any time  Patient is aware this is a billable service  Subjective  Sharon Lowery is a 78 y o  male    Presents for virtual visit  Patient denies any chest pain or palpitations  No history of leg edema orthopnea PND  No history of presyncope syncope  He states that he has been compliant with all his present medications  No bleeding issues  He was seen by electrophysiologist a few months ago  Patient does have an Apple watch and has been checking his rhythm on a regular basis  He is maintaining sinus rhythm         HPI     Past Medical History:   Diagnosis Date    Atrial fibrillation (HCC)     Benign hypertension     Blood coagulation disorder (HCC)     Hypertriglyceridemia     Lipid metabolism disorder     Neurogenic bladder     Other postprocedural cardiac functional disturbances following cardiac surgery        Past Surgical History:   Procedure Laterality Date    HIP SURGERY Right 02/2019    KNEE SURGERY Bilateral     LIPOMA RESECTION         Current Outpatient Medications   Medication Sig Dispense Refill    apixaban (ELIQUIS) 5 mg Take 1 tablet (5 mg total) by mouth 2 (two) times a day 180 tablet 3    azelastine (ASTELIN) 0 1 % nasal spray 2 sprays into each nostril 2 (two) times a day Use in each nostril as directed 1 Bottle 1    benzonatate (TESSALON) 200 MG capsule Take 1 capsule (200 mg total) by mouth 3 (three) times a day as needed for cough 20 capsule 0    Cream Base (PCCA LIPODERM BASE) CREA PCCA Lipoderm Base cream      ipratropium (ATROVENT HFA) 17 mcg/act inhaler Inhale 2 puffs 4 (four) times a day 12 9 g 1    lidocaine (LIDODERM) 5 %   0    promethazine-codeine (PHENERGAN WITH CODEINE) 6 25-10 mg/5 mL syrup Take 5 mL by mouth every 4 (four) hours as needed for cough 120 mL 0    simvastatin (ZOCOR) 20 mg tablet Take 1 tablet (20 mg total) by mouth daily at bedtime 90 tablet 3    sotalol (BETAPACE) 80 mg tablet 2 (two) times a day       tamsulosin (FLOMAX) 0 4 mg Take 0 4 mg by mouth daily with dinner         No current facility-administered medications for this visit  Allergies   Allergen Reactions    Ramipril Cough       Review of Systems     REVIEW OF SYSTEMS:  Constitutional:  Denies fever or chills   Eyes:  Denies change in visual acuity   HENT:  Denies nasal congestion or sore throat   Respiratory:  Denies cough or shortness of breath   Cardiovascular:  Denies chest pain or edema   GI:  Denies abdominal pain, nausea, vomiting, bloody stools or diarrhea   :  Denies dysuria, frequency, difficulty in micturition and nocturia  Musculoskeletal:  Denies back pain or joint pain   Neurologic:  Denies headache, focal weakness or sensory changes   Endocrine:  Denies polyuria or polydipsia   Lymphatic:  Denies swollen glands   Psychiatric:  Denies depression or anxiety     Video Exam  Patient appears comfortable  Alert, awake and oriented  No apparent distress  Neck exam appears unremarkable  HEENT also otherwise unremarkable  Tyshawn Kirkland VIRTUAL VISIT DISCLAIMER    Kwaku Daly acknowledges that he has consented to an online visit or consultation  He understands that the online visit is based solely on information provided by him, and that, in the absence of a face-to-face physical evaluation by the physician, the diagnosis he receives is both limited and provisional in terms of accuracy and completeness   This is not intended to replace a full medical face-to-face evaluation by the physician  Rajinder Ruiz understands and accepts these terms

## 2021-03-10 DIAGNOSIS — Z23 ENCOUNTER FOR IMMUNIZATION: ICD-10-CM

## 2021-05-07 DIAGNOSIS — E78.2 MIXED HYPERLIPIDEMIA: ICD-10-CM

## 2021-05-07 RX ORDER — SIMVASTATIN 20 MG
20 TABLET ORAL
Qty: 90 TABLET | Refills: 3 | Status: SHIPPED | OUTPATIENT
Start: 2021-05-07 | End: 2021-09-17 | Stop reason: SDUPTHER

## 2021-05-27 DIAGNOSIS — R05.9 COUGH: ICD-10-CM

## 2021-05-27 RX ORDER — AZELASTINE HYDROCHLORIDE 137 UG/1
SPRAY, METERED NASAL
Qty: 30 ML | Refills: 1 | Status: SHIPPED | OUTPATIENT
Start: 2021-05-27

## 2021-09-09 DIAGNOSIS — I48.91 NEW ONSET A-FIB (HCC): ICD-10-CM

## 2021-09-09 NOTE — TELEPHONE ENCOUNTER
Name of Caller: Marly     Call back Number: [phone]    Medication(s): apixaban (ELIQUIS) 5 mg  Are we prescribing provider?:    30 or 90 day supply:     Pharmacy name/number: [id number]   Last or Next appt?:      Pharmacist stated that pt stated that he is out of medication

## 2021-09-16 ENCOUNTER — TELEPHONE (OUTPATIENT)
Dept: CARDIOLOGY CLINIC | Facility: CLINIC | Age: 71
End: 2021-09-16

## 2021-09-16 DIAGNOSIS — E78.2 MIXED HYPERLIPIDEMIA: ICD-10-CM

## 2021-09-16 NOTE — TELEPHONE ENCOUNTER
Hello, pt has not been seen in over a year and needs an appt  Please schedule for refills please  He has also been seeing [facility] cardiology   Thank you

## 2021-09-17 RX ORDER — SIMVASTATIN 20 MG
20 TABLET ORAL
Qty: 90 TABLET | Refills: 1 | Status: SHIPPED | OUTPATIENT
Start: 2021-09-17 | End: 2021-11-22 | Stop reason: SDUPTHER

## 2021-09-17 NOTE — TELEPHONE ENCOUNTER
Spoke to pt & he is going to [facility] on 10/28/21 to see Dr Cony Ferreira  Pt said that he does see Dr Eleanora Primrose from [address] [place] [address] only for his Afib & that Dr Cony Ferreira is aware

## 2021-09-30 ENCOUNTER — TELEPHONE (OUTPATIENT)
Dept: FAMILY MEDICINE CLINIC | Facility: CLINIC | Age: 71
End: 2021-09-30

## 2021-09-30 NOTE — TELEPHONE ENCOUNTER
T/c from patient     Patient is requesting a referral for out [facility] to see a general Surgeon on Friday Oct 8, 2021          Please advise

## 2021-09-30 NOTE — TELEPHONE ENCOUNTER
Antonina please call pt  Why does he needs a referral for a general surgeon and who is he seeing? Pt last office visit for a pre-op was in 2019, other recent visit have been for acute reason  Kamala Fitch

## 2021-10-08 ENCOUNTER — TELEPHONE (OUTPATIENT)
Dept: FAMILY MEDICINE CLINIC | Facility: CLINIC | Age: 71
End: 2021-10-08

## 2021-10-15 ENCOUNTER — OFFICE VISIT (OUTPATIENT)
Dept: FAMILY MEDICINE CLINIC | Facility: CLINIC | Age: 71
End: 2021-10-15
Payer: MEDICARE

## 2021-10-15 VITALS
OXYGEN SATURATION: 99 % | HEIGHT: 76 IN | HEART RATE: 76 BPM | DIASTOLIC BLOOD PRESSURE: 82 MMHG | SYSTOLIC BLOOD PRESSURE: 122 MMHG | WEIGHT: 185.6 LBS | BODY MASS INDEX: 22.6 KG/M2

## 2021-10-15 DIAGNOSIS — Z79.01 CURRENT USE OF LONG TERM ANTICOAGULATION: ICD-10-CM

## 2021-10-15 DIAGNOSIS — N31.9 NEUROGENIC BLADDER: ICD-10-CM

## 2021-10-15 DIAGNOSIS — I71.2 THORACIC AORTIC ANEURYSM WITHOUT RUPTURE (HCC): ICD-10-CM

## 2021-10-15 DIAGNOSIS — I10 ESSENTIAL HYPERTENSION: ICD-10-CM

## 2021-10-15 DIAGNOSIS — R91.1 LUNG NODULE < 6CM ON CT: ICD-10-CM

## 2021-10-15 DIAGNOSIS — Z80.0 FAMILY HISTORY OF COLON CANCER IN MOTHER: ICD-10-CM

## 2021-10-15 DIAGNOSIS — E78.2 MIXED HYPERLIPIDEMIA: ICD-10-CM

## 2021-10-15 DIAGNOSIS — M54.50 CHRONIC BILATERAL LOW BACK PAIN WITHOUT SCIATICA: ICD-10-CM

## 2021-10-15 DIAGNOSIS — I48.11 LONGSTANDING PERSISTENT ATRIAL FIBRILLATION (HCC): ICD-10-CM

## 2021-10-15 DIAGNOSIS — Z87.891 HISTORY OF TOBACCO USE: ICD-10-CM

## 2021-10-15 DIAGNOSIS — K40.20 NON-RECURRENT BILATERAL INGUINAL HERNIA WITHOUT OBSTRUCTION OR GANGRENE: Primary | ICD-10-CM

## 2021-10-15 DIAGNOSIS — Z76.89 ENCOUNTER TO ESTABLISH CARE WITH NEW DOCTOR: ICD-10-CM

## 2021-10-15 DIAGNOSIS — I48.4 ATYPICAL ATRIAL FLUTTER (HCC): ICD-10-CM

## 2021-10-15 DIAGNOSIS — Z85.828 HISTORY OF SKIN CANCER: ICD-10-CM

## 2021-10-15 DIAGNOSIS — G89.29 CHRONIC BILATERAL LOW BACK PAIN WITHOUT SCIATICA: ICD-10-CM

## 2021-10-15 DIAGNOSIS — I42.9 CARDIOMYOPATHY, UNSPECIFIED TYPE (HCC): ICD-10-CM

## 2021-10-15 PROBLEM — M16.10 HIP ARTHRITIS: Status: RESOLVED | Noted: 2019-01-22 | Resolved: 2021-10-15

## 2021-10-15 PROBLEM — Z01.818 PREOP EXAMINATION: Status: RESOLVED | Noted: 2019-01-22 | Resolved: 2021-10-15

## 2021-10-15 PROBLEM — IMO0001 LUNG NODULE < 6CM ON CT: Status: ACTIVE | Noted: 2021-10-15

## 2021-10-15 PROBLEM — I71.20 THORACIC AORTIC ANEURYSM WITHOUT RUPTURE: Status: ACTIVE | Noted: 2021-10-15

## 2021-10-15 PROCEDURE — G0402 INITIAL PREVENTIVE EXAM: HCPCS | Performed by: STUDENT IN AN ORGANIZED HEALTH CARE EDUCATION/TRAINING PROGRAM

## 2021-10-15 PROCEDURE — 1123F ACP DISCUSS/DSCN MKR DOCD: CPT | Performed by: STUDENT IN AN ORGANIZED HEALTH CARE EDUCATION/TRAINING PROGRAM

## 2021-11-19 ENCOUNTER — HOSPITAL ENCOUNTER (OUTPATIENT)
Dept: CT IMAGING | Facility: CLINIC | Age: 71
Discharge: HOME | End: 2021-11-19
Payer: MEDICARE

## 2021-11-19 DIAGNOSIS — R91.1 LUNG NODULE < 6CM ON CT: ICD-10-CM

## 2021-11-19 PROCEDURE — G1004 CDSM NDSC: HCPCS

## 2021-11-19 PROCEDURE — 71260 CT THORAX DX C+: CPT

## 2021-11-19 RX ADMIN — IOHEXOL 85 ML: 350 INJECTION, SOLUTION INTRAVENOUS at 10:41

## 2021-11-22 ENCOUNTER — OFFICE VISIT (OUTPATIENT)
Dept: CARDIOLOGY CLINIC | Facility: CLINIC | Age: 71
End: 2021-11-22
Payer: MEDICARE

## 2021-11-22 VITALS
OXYGEN SATURATION: 98 % | BODY MASS INDEX: 22.89 KG/M2 | WEIGHT: 188 LBS | HEART RATE: 72 BPM | HEIGHT: 76 IN | DIASTOLIC BLOOD PRESSURE: 70 MMHG | SYSTOLIC BLOOD PRESSURE: 132 MMHG | RESPIRATION RATE: 16 BRPM

## 2021-11-22 DIAGNOSIS — E78.2 MIXED HYPERLIPIDEMIA: ICD-10-CM

## 2021-11-22 DIAGNOSIS — I10 ESSENTIAL HYPERTENSION: ICD-10-CM

## 2021-11-22 DIAGNOSIS — Z51.81 ENCOUNTER FOR MONITORING SOTALOL THERAPY: ICD-10-CM

## 2021-11-22 DIAGNOSIS — Z01.810 PRE-OPERATIVE CARDIOVASCULAR EXAMINATION: Primary | ICD-10-CM

## 2021-11-22 DIAGNOSIS — Z79.899 ENCOUNTER FOR MONITORING SOTALOL THERAPY: ICD-10-CM

## 2021-11-22 DIAGNOSIS — I48.0 PAF (PAROXYSMAL ATRIAL FIBRILLATION) (HCC): ICD-10-CM

## 2021-11-22 PROCEDURE — 93000 ELECTROCARDIOGRAM COMPLETE: CPT | Performed by: INTERNAL MEDICINE

## 2021-11-22 PROCEDURE — 99214 OFFICE O/P EST MOD 30 MIN: CPT | Performed by: INTERNAL MEDICINE

## 2021-11-22 RX ORDER — SIMVASTATIN 20 MG
20 TABLET ORAL
Qty: 90 TABLET | Refills: 3 | Status: SHIPPED | OUTPATIENT
Start: 2021-11-22

## 2021-11-23 ENCOUNTER — TELEPHONE (OUTPATIENT)
Dept: CARDIOLOGY CLINIC | Facility: CLINIC | Age: 71
End: 2021-11-23

## 2021-11-23 NOTE — TELEPHONE ENCOUNTER
Name of Caller: Patient had o/v w/Dr Pau Thurman 11/22/2021  Patient did not have blood work prior to visit  Patient is asking if Dr Pau Thurman can put an order in the computer for cholesterol and triglycerides to be checked  Call back number: [phone]    Last or Next appt: 11/22/2021

## 2021-11-23 NOTE — TELEPHONE ENCOUNTER
S/w patient and made aware orders were placed  Patient stated that he will be going to a [facility]

## 2021-12-01 ENCOUNTER — TELEPHONE (OUTPATIENT)
Dept: FAMILY MEDICINE CLINIC | Facility: CLINIC | Age: 71
End: 2021-12-01

## 2021-12-01 DIAGNOSIS — Q45.3 PANCREATIC ABNORMALITY: Primary | ICD-10-CM

## 2021-12-07 ENCOUNTER — APPOINTMENT (OUTPATIENT)
Dept: LAB | Facility: CLINIC | Age: 71
End: 2021-12-07
Payer: MEDICARE

## 2021-12-07 DIAGNOSIS — E78.2 MIXED HYPERLIPIDEMIA: ICD-10-CM

## 2021-12-07 DIAGNOSIS — Z79.01 CURRENT USE OF LONG TERM ANTICOAGULATION: ICD-10-CM

## 2021-12-07 LAB
ALBUMIN SERPL BCP-MCNC: 3.8 G/DL (ref 3.5–5)
ALP SERPL-CCNC: 83 U/L (ref 46–116)
ALT SERPL W P-5'-P-CCNC: 39 U/L (ref 12–78)
ANION GAP SERPL CALCULATED.3IONS-SCNC: 2 MMOL/L (ref 4–13)
AST SERPL W P-5'-P-CCNC: 23 U/L (ref 5–45)
BILIRUB SERPL-MCNC: 1.05 MG/DL (ref 0.2–1)
BUN SERPL-MCNC: 17 MG/DL (ref 5–25)
CALCIUM SERPL-MCNC: 9.7 MG/DL (ref 8.3–10.1)
CHLORIDE SERPL-SCNC: 109 MMOL/L (ref 100–108)
CHOLEST SERPL-MCNC: 147 MG/DL
CO2 SERPL-SCNC: 28 MMOL/L (ref 21–32)
CREAT SERPL-MCNC: 0.92 MG/DL (ref 0.6–1.3)
GFR SERPL CREATININE-BSD FRML MDRD: 85 ML/MIN/1.73SQ M
GLUCOSE P FAST SERPL-MCNC: 98 MG/DL (ref 65–99)
HDLC SERPL-MCNC: 50 MG/DL
LDLC SERPL CALC-MCNC: 62 MG/DL (ref 0–100)
NONHDLC SERPL-MCNC: 97 MG/DL
POTASSIUM SERPL-SCNC: 4.4 MMOL/L (ref 3.5–5.3)
PROT SERPL-MCNC: 7.1 G/DL (ref 6.4–8.2)
SODIUM SERPL-SCNC: 139 MMOL/L (ref 136–145)
TRIGL SERPL-MCNC: 175 MG/DL

## 2021-12-07 PROCEDURE — 80061 LIPID PANEL: CPT | Performed by: INTERNAL MEDICINE

## 2021-12-07 PROCEDURE — 85025 COMPLETE CBC W/AUTO DIFF WBC: CPT | Performed by: INTERNAL MEDICINE

## 2021-12-07 PROCEDURE — 80053 COMPREHEN METABOLIC PANEL: CPT | Performed by: INTERNAL MEDICINE

## 2021-12-07 PROCEDURE — 36415 COLL VENOUS BLD VENIPUNCTURE: CPT | Performed by: INTERNAL MEDICINE

## 2021-12-08 LAB
BASOPHILS # BLD AUTO: 0.03 THOUSANDS/ΜL (ref 0–0.1)
BASOPHILS NFR BLD AUTO: 1 % (ref 0–1)
EOSINOPHIL # BLD AUTO: 0.11 THOUSAND/ΜL (ref 0–0.61)
EOSINOPHIL NFR BLD AUTO: 2 % (ref 0–6)
ERYTHROCYTE [DISTWIDTH] IN BLOOD BY AUTOMATED COUNT: 11.9 % (ref 11.6–15.1)
HCT VFR BLD AUTO: 47.1 % (ref 36.5–49.3)
HGB BLD-MCNC: 16.4 G/DL (ref 12–17)
IMM GRANULOCYTES # BLD AUTO: 0.02 THOUSAND/UL (ref 0–0.2)
IMM GRANULOCYTES NFR BLD AUTO: 0 % (ref 0–2)
LYMPHOCYTES # BLD AUTO: 1.37 THOUSANDS/ΜL (ref 0.6–4.47)
LYMPHOCYTES NFR BLD AUTO: 26 % (ref 14–44)
MCH RBC QN AUTO: 33.8 PG (ref 26.8–34.3)
MCHC RBC AUTO-ENTMCNC: 34.8 G/DL (ref 31.4–37.4)
MCV RBC AUTO: 97 FL (ref 82–98)
MONOCYTES # BLD AUTO: 0.48 THOUSAND/ΜL (ref 0.17–1.22)
MONOCYTES NFR BLD AUTO: 9 % (ref 4–12)
NEUTROPHILS # BLD AUTO: 3.34 THOUSANDS/ΜL (ref 1.85–7.62)
NEUTS SEG NFR BLD AUTO: 62 % (ref 43–75)
NRBC BLD AUTO-RTO: 0 /100 WBCS
PLATELET # BLD AUTO: 169 THOUSANDS/UL (ref 149–390)
PMV BLD AUTO: 10.9 FL (ref 8.9–12.7)
RBC # BLD AUTO: 4.85 MILLION/UL (ref 3.88–5.62)
WBC # BLD AUTO: 5.35 THOUSAND/UL (ref 4.31–10.16)

## 2022-04-13 ENCOUNTER — TELEPHONE (OUTPATIENT)
Dept: GASTROENTEROLOGY | Facility: CLINIC | Age: 72
End: 2022-04-13

## 2022-05-10 ENCOUNTER — TELEPHONE (OUTPATIENT)
Dept: GASTROENTEROLOGY | Facility: CLINIC | Age: 72
End: 2022-05-10

## 2022-05-10 ENCOUNTER — OFFICE VISIT (OUTPATIENT)
Dept: GASTROENTEROLOGY | Facility: CLINIC | Age: 72
End: 2022-05-10
Payer: MEDICARE

## 2022-05-10 VITALS
WEIGHT: 186 LBS | BODY MASS INDEX: 23.13 KG/M2 | HEART RATE: 92 BPM | HEIGHT: 75 IN | DIASTOLIC BLOOD PRESSURE: 80 MMHG | OXYGEN SATURATION: 98 % | SYSTOLIC BLOOD PRESSURE: 128 MMHG

## 2022-05-10 DIAGNOSIS — Z80.0 FAMILY HISTORY OF COLON CANCER IN MOTHER: Primary | ICD-10-CM

## 2022-05-10 PROCEDURE — 99203 OFFICE O/P NEW LOW 30 MIN: CPT | Performed by: INTERNAL MEDICINE

## 2022-05-10 RX ORDER — SENNOSIDES 8.6 MG
1300 CAPSULE ORAL EVERY 8 HOURS PRN
COMMUNITY

## 2022-05-10 RX ORDER — FLUTICASONE PROPIONATE 50 MCG
SPRAY, SUSPENSION (ML) NASAL
COMMUNITY

## 2022-05-10 NOTE — PROGRESS NOTES
[facility]      Chief Complaint:  Family history of colon cancer    HPI:  Sharon Lowery is a 73 y o   male who presents with family history of colon cancer in the mother  His last colonoscopy was 5 years ago and negative at that time  Patient denies any abdominal pain, change in bowel habits, change in stool caliber, melena, hematochezia, rectal bleeding, tenesmus, or weight loss  He has chronic atrial fibrillation and is on Eliquis  He denies any chest pain, shortness of breath, dizziness, or any other issues  No other bleeding  He actually feels very well and has no significant complaints         Review of Systems:   Constitutional: No fever or chills, feels well, no tiredness, no recent weight gain or weight loss  HENT: No complaints of earache, no hearing loss, no nosebleeds, no nasal discharge, no sore throat, no hoarseness  Eyes: No complaints of eye pain, no red eyes, no discharge from eyes, no itchy eyes  Cardiovascular: No complaints of slow heart rate, no fast heart rate, no chest pain, no palpitations, no leg claudication, no lower extremity edema  Respiratory: No complaints of shortness of breath, no wheezing, no cough, no SOB on exertion, no orthopnea  Gastrointestinal: As noted in HPI  Genitourinary: No complaints of dysuria, no incontinence, no hesitancy, no nocturia  Musculoskeletal: No complaints of arthralgia, no myalgias, no joint swelling or stiffness, no limb pain or swelling  Neurological: No complaints of headache, no confusion, no convulsions, no numbness or tingling, no dizziness or fainting, no limb weakness, no difficulty walking  Skin: No complaints of skin rash or skin lesions, no itching, no skin wound, no dry skin  Hematological/Lymphatic: No complaints of swollen glands, does not bleed easy  Allergic/Immunologic: No immunocompromised state  Endocrine:  No complaints of polyuria, no polydipsia     Psychiatric/Behavioral: is not suicidal, no sleep disturbances, no anxiety or depression, no change in personality, no emotional problems  Historical Information   Past Medical History:   Diagnosis Date    Atrial fibrillation ([address] )     Benign hypertension     Blood coagulation disorder (HCC)     Hip arthritis 1/22/2019    Hypertriglyceridemia     Lipid metabolism disorder     Neurogenic bladder     Other postprocedural cardiac functional disturbances following cardiac surgery     Preop examination 1/22/2019     Past Surgical History:   Procedure Laterality Date    HERNIA REPAIR      HIP SURGERY Right 02/2019    KNEE SURGERY Bilateral     LIPOMA RESECTION       Social History   Social History     Substance and Sexual Activity   Alcohol Use Yes    Alcohol/week: 1 0 standard drink    Types: 1 Glasses of wine per week    Comment: per night     Social History     Substance and Sexual Activity   Drug Use No     Social History     Tobacco Use   Smoking Status Former Smoker   Smokeless Tobacco Never Used   Tobacco Comment    quite in 1972     Family History   Problem Relation Age of Onset    Diabetes Mother     Colon cancer Mother     Lung cancer Father          Current Medications: has a current medication list which includes the following prescription(s): acetaminophen, azelastine hcl, pcca lipoderm base, fluticasone, simvastatin, sotalol, tamsulosin, apixaban, benzonatate, and ipratropium  Vital Signs: /80   Pulse 92   Ht 6' 3" (1 905 m)   Wt 84 4 kg (186 lb)   SpO2 98%   BMI 23 25 kg/m²       Physical Exam:   Constitutional  General Appearance: No acute distress, well appearing and well nourished  Head  Normocephalic  Eyes  Conjunctivae and lids: No swelling, erythema, or discharge  Pupils and irises: Equal, round and reactive to light     Ears, Nose, Mouth, and Throat  External inspection of ears and nose: Normal  Nasal mucosa, septum and turbinates: Normal without edema or erythema/   Oropharynx: Normal with no erythema, edema, exudate or lesions  Neck  Normal range of motion  Neck supple  Cardiovascular  Auscultation of the heart: Normal rate and rhythm, irregularly irregular S1 and S2 without murmurs  Examination of the extremities for edema and/or varicosities: Normal  Pulmonary/Chest  Respiratory effort: No increased work of breathing or signs of respiratory distress  Auscultation of lungs: Clear to auscultation, equal breath sounds bilaterally, no wheezes, rales, no rhonchi  Abdomen  Abdomen: Non-tender, no masses  Liver and spleen: No hepatomegaly or splenomegaly  Musculoskeletal  Gait and station: normal   Digits and Nails: normal without clubbing or cyanosis  Inspection/palpation of joints, bones, and muscles: Normal  Neurological  No nystagmus or asterixis  Skin  Skin and subcutaneous tissue: Normal without rashes or lesions  Lymphatic  Palpation of the lymph nodes in neck: No lymphadenopathy  Psychiatric  Orientation to person, place and time: Normal   Mood and affect: Normal          Labs:  Lab Results   Component Value Date    ALT 39 12/07/2021    AST 23 12/07/2021    BUN 17 12/07/2021    CALCIUM 9 7 12/07/2021     (H) 12/07/2021    CO2 28 12/07/2021    CREATININE 0 92 12/07/2021    HDL 50 12/07/2021    HCT 47 1 12/07/2021    HGB 16 4 12/07/2021    MG 2 2 11/29/2018     12/07/2021    K 4 4 12/07/2021    TRIG 175 (H) 12/07/2021    WBC 5 35 12/07/2021         X-Rays & Procedures:   No orders to display           ______________________________________________________________________      Assessment & Plan:     Diagnoses and all orders for this visit:    Family history of colon cancer in mother  -     Colonoscopy; Future      Patient will be set up for repeat colonoscopy    Further recommendations will depend on study results

## 2022-05-10 NOTE — PATIENT INSTRUCTIONS
Scheduled date of colonoscopy (as of today): 5/25  Physician performing colonoscopy: GENI   Location of colonoscopy: [facility]  Bowel prep reviewed with patient: PHILL NERI [facility]  Instructions reviewed with patient by:SHERIDAN  Clearances:

## 2022-05-10 NOTE — H&P (VIEW-ONLY)
[facility]      Chief Complaint:  Family history of colon cancer    HPI:  Orestes Mcclendon is a 73 y o   male who presents with family history of colon cancer in the mother  His last colonoscopy was 5 years ago and negative at that time  Patient denies any abdominal pain, change in bowel habits, change in stool caliber, melena, hematochezia, rectal bleeding, tenesmus, or weight loss  He has chronic atrial fibrillation and is on Eliquis  He denies any chest pain, shortness of breath, dizziness, or any other issues  No other bleeding  He actually feels very well and has no significant complaints         Review of Systems:   Constitutional: No fever or chills, feels well, no tiredness, no recent weight gain or weight loss  HENT: No complaints of earache, no hearing loss, no nosebleeds, no nasal discharge, no sore throat, no hoarseness  Eyes: No complaints of eye pain, no red eyes, no discharge from eyes, no itchy eyes  Cardiovascular: No complaints of slow heart rate, no fast heart rate, no chest pain, no palpitations, no leg claudication, no lower extremity edema  Respiratory: No complaints of shortness of breath, no wheezing, no cough, no SOB on exertion, no orthopnea  Gastrointestinal: As noted in HPI  Genitourinary: No complaints of dysuria, no incontinence, no hesitancy, no nocturia  Musculoskeletal: No complaints of arthralgia, no myalgias, no joint swelling or stiffness, no limb pain or swelling  Neurological: No complaints of headache, no confusion, no convulsions, no numbness or tingling, no dizziness or fainting, no limb weakness, no difficulty walking  Skin: No complaints of skin rash or skin lesions, no itching, no skin wound, no dry skin  Hematological/Lymphatic: No complaints of swollen glands, does not bleed easy  Allergic/Immunologic: No immunocompromised state  Endocrine:  No complaints of polyuria, no polydipsia     Psychiatric/Behavioral: is not suicidal, no sleep disturbances, no anxiety or depression, no change in personality, no emotional problems  Historical Information   Past Medical History:   Diagnosis Date    Atrial fibrillation ([address] )     Benign hypertension     Blood coagulation disorder (HCC)     Hip arthritis 1/22/2019    Hypertriglyceridemia     Lipid metabolism disorder     Neurogenic bladder     Other postprocedural cardiac functional disturbances following cardiac surgery     Preop examination 1/22/2019     Past Surgical History:   Procedure Laterality Date    HERNIA REPAIR      HIP SURGERY Right 02/2019    KNEE SURGERY Bilateral     LIPOMA RESECTION       Social History   Social History     Substance and Sexual Activity   Alcohol Use Yes    Alcohol/week: 1 0 standard drink    Types: 1 Glasses of wine per week    Comment: per night     Social History     Substance and Sexual Activity   Drug Use No     Social History     Tobacco Use   Smoking Status Former Smoker   Smokeless Tobacco Never Used   Tobacco Comment    quite in 1972     Family History   Problem Relation Age of Onset    Diabetes Mother     Colon cancer Mother     Lung cancer Father          Current Medications: has a current medication list which includes the following prescription(s): acetaminophen, azelastine hcl, pcca lipoderm base, fluticasone, simvastatin, sotalol, tamsulosin, apixaban, benzonatate, and ipratropium  Vital Signs: /80   Pulse 92   Ht 6' 3" (1 905 m)   Wt 84 4 kg (186 lb)   SpO2 98%   BMI 23 25 kg/m²       Physical Exam:   Constitutional  General Appearance: No acute distress, well appearing and well nourished  Head  Normocephalic  Eyes  Conjunctivae and lids: No swelling, erythema, or discharge  Pupils and irises: Equal, round and reactive to light     Ears, Nose, Mouth, and Throat  External inspection of ears and nose: Normal  Nasal mucosa, septum and turbinates: Normal without edema or erythema/   Oropharynx: Normal with no erythema, edema, exudate or lesions  Neck  Normal range of motion  Neck supple  Cardiovascular  Auscultation of the heart: Normal rate and rhythm, irregularly irregular S1 and S2 without murmurs  Examination of the extremities for edema and/or varicosities: Normal  Pulmonary/Chest  Respiratory effort: No increased work of breathing or signs of respiratory distress  Auscultation of lungs: Clear to auscultation, equal breath sounds bilaterally, no wheezes, rales, no rhonchi  Abdomen  Abdomen: Non-tender, no masses  Liver and spleen: No hepatomegaly or splenomegaly  Musculoskeletal  Gait and station: normal   Digits and Nails: normal without clubbing or cyanosis  Inspection/palpation of joints, bones, and muscles: Normal  Neurological  No nystagmus or asterixis  Skin  Skin and subcutaneous tissue: Normal without rashes or lesions  Lymphatic  Palpation of the lymph nodes in neck: No lymphadenopathy  Psychiatric  Orientation to person, place and time: Normal   Mood and affect: Normal          Labs:  Lab Results   Component Value Date    ALT 39 12/07/2021    AST 23 12/07/2021    BUN 17 12/07/2021    CALCIUM 9 7 12/07/2021     (H) 12/07/2021    CO2 28 12/07/2021    CREATININE 0 92 12/07/2021    HDL 50 12/07/2021    HCT 47 1 12/07/2021    HGB 16 4 12/07/2021    MG 2 2 11/29/2018     12/07/2021    K 4 4 12/07/2021    TRIG 175 (H) 12/07/2021    WBC 5 35 12/07/2021         X-Rays & Procedures:   No orders to display           ______________________________________________________________________      Assessment & Plan:     Diagnoses and all orders for this visit:    Family history of colon cancer in mother  -     Colonoscopy; Future      Patient will be set up for repeat colonoscopy    Further recommendations will depend on study results

## 2022-05-25 ENCOUNTER — ANESTHESIA (OUTPATIENT)
Dept: GASTROENTEROLOGY | Facility: HOSPITAL | Age: 72
End: 2022-05-25

## 2022-05-25 ENCOUNTER — ANESTHESIA EVENT (OUTPATIENT)
Dept: GASTROENTEROLOGY | Facility: HOSPITAL | Age: 72
End: 2022-05-25

## 2022-05-25 ENCOUNTER — HOSPITAL ENCOUNTER (OUTPATIENT)
Dept: GASTROENTEROLOGY | Facility: HOSPITAL | Age: 72
Setting detail: OUTPATIENT SURGERY
Discharge: HOME | End: 2022-05-25
Attending: INTERNAL MEDICINE | Admitting: INTERNAL MEDICINE
Payer: MEDICARE

## 2022-05-25 VITALS
BODY MASS INDEX: 22.23 KG/M2 | RESPIRATION RATE: 20 BRPM | HEART RATE: 60 BPM | TEMPERATURE: 97.9 F | OXYGEN SATURATION: 95 % | HEIGHT: 75 IN | WEIGHT: 178.79 LBS | SYSTOLIC BLOOD PRESSURE: 114 MMHG | DIASTOLIC BLOOD PRESSURE: 55 MMHG

## 2022-05-25 DIAGNOSIS — Z80.0 FAMILY HISTORY OF COLON CANCER IN MOTHER: ICD-10-CM

## 2022-05-25 PROCEDURE — 88305 TISSUE EXAM BY PATHOLOGIST: CPT | Performed by: PATHOLOGY

## 2022-05-25 PROCEDURE — 45385 COLONOSCOPY W/LESION REMOVAL: CPT | Performed by: INTERNAL MEDICINE

## 2022-05-25 PROCEDURE — 45380 COLONOSCOPY AND BIOPSY: CPT | Performed by: INTERNAL MEDICINE

## 2022-05-25 RX ORDER — PROPOFOL 10 MG/ML
INJECTION, EMULSION INTRAVENOUS AS NEEDED
Status: DISCONTINUED | OUTPATIENT
Start: 2022-05-25 | End: 2022-05-25

## 2022-05-25 RX ORDER — LIDOCAINE HYDROCHLORIDE 10 MG/ML
INJECTION, SOLUTION EPIDURAL; INFILTRATION; INTRACAUDAL; PERINEURAL AS NEEDED
Status: DISCONTINUED | OUTPATIENT
Start: 2022-05-25 | End: 2022-05-25

## 2022-05-25 RX ORDER — SODIUM CHLORIDE, SODIUM LACTATE, POTASSIUM CHLORIDE, CALCIUM CHLORIDE 600; 310; 30; 20 MG/100ML; MG/100ML; MG/100ML; MG/100ML
INJECTION, SOLUTION INTRAVENOUS CONTINUOUS PRN
Status: DISCONTINUED | OUTPATIENT
Start: 2022-05-25 | End: 2022-05-25

## 2022-05-25 RX ADMIN — PROPOFOL 50 MG: 10 INJECTION, EMULSION INTRAVENOUS at 08:13

## 2022-05-25 RX ADMIN — PROPOFOL 70 MG: 10 INJECTION, EMULSION INTRAVENOUS at 07:56

## 2022-05-25 RX ADMIN — LIDOCAINE HYDROCHLORIDE 40 MG: 10 INJECTION, SOLUTION EPIDURAL; INFILTRATION; INTRACAUDAL at 07:56

## 2022-05-25 RX ADMIN — SODIUM CHLORIDE, SODIUM LACTATE, POTASSIUM CHLORIDE, AND CALCIUM CHLORIDE: .6; .31; .03; .02 INJECTION, SOLUTION INTRAVENOUS at 07:54

## 2022-05-25 RX ADMIN — PROPOFOL 50 MG: 10 INJECTION, EMULSION INTRAVENOUS at 08:05

## 2022-05-25 RX ADMIN — PROPOFOL 50 MG: 10 INJECTION, EMULSION INTRAVENOUS at 07:58

## 2022-05-25 NOTE — INTERVAL H&P NOTE
H&P reviewed  After examining the patient I find no changes in the patients condition since the H&P had been written      Vitals:    05/25/22 0703   BP: 125/73   Pulse: 78   Resp: 18   Temp: (!) 97 4 °F (36 3 °C)   SpO2: 98%
none

## 2022-05-25 NOTE — ANESTHESIA PREPROCEDURE EVALUATION
Procedure:  COLONOSCOPY    Relevant Problems   CARDIO   (+) Atrial flutter (HCC)   (+) Essential hypertension   (+) Mixed hyperlipidemia   (+) Thoracic aortic aneurysm without rupture (HCC)      MUSCULOSKELETAL   (+) Chronic bilateral low back pain without sciatica      NEURO/PSYCH   (+) Chronic bilateral low back pain without sciatica   (+) History of skin cancer        Physical Exam    Airway    Mallampati score: I  TM Distance: >3 FB  Neck ROM: full     Dental   No notable dental hx     Cardiovascular  Rhythm: regular, Rate: normal, Cardiovascular exam normal    Pulmonary  Pulmonary exam normal Breath sounds clear to auscultation,     Other Findings        Anesthesia Plan  ASA Score- 3     Anesthesia Type-         Additional Monitors:   Airway Plan: NTT  Plan Factors-Exercise tolerance (METS): >4 METS  Chart reviewed  EKG reviewed  Imaging results reviewed  Existing labs reviewed  Patient summary reviewed  Obstructive sleep apnea risk education given perioperatively  Induction- intravenous  Postoperative Plan-     Informed Consent- Anesthetic plan and risks discussed with patient  I personally reviewed this patient with the CRNA  Discussed and agreed on the Anesthesia Plan with the CRNA  Ja Gallegos

## 2022-05-25 NOTE — ANESTHESIA POSTPROCEDURE EVALUATION
Post-Op Assessment Note    CV Status:  Stable    Pain management: adequate     Mental Status:  Alert and awake   Hydration Status:  Euvolemic   PONV Controlled:  Controlled   Airway Patency:  Patent      Post Op Vitals Reviewed: Yes      Staff: CRNA         No complications documented      BP   94/54   Temp     Pulse  64   Resp   16   SpO2   97

## 2022-05-27 ENCOUNTER — CLINICAL SUPPORT (OUTPATIENT)
Dept: CARDIOLOGY CLINIC | Facility: CLINIC | Age: 72
End: 2022-05-27
Payer: MEDICARE

## 2022-05-27 ENCOUNTER — TELEPHONE (OUTPATIENT)
Dept: CARDIOLOGY CLINIC | Facility: CLINIC | Age: 72
End: 2022-05-27

## 2022-05-27 DIAGNOSIS — I48.91 ATRIAL FIBRILLATION, UNSPECIFIED TYPE (HCC): Primary | ICD-10-CM

## 2022-05-27 PROCEDURE — 93000 ELECTROCARDIOGRAM COMPLETE: CPT

## 2022-05-27 PROCEDURE — 99211 OFF/OP EST MAY X REQ PHY/QHP: CPT

## 2022-05-27 NOTE — TELEPHONE ENCOUNTER
Pt called & stated that he has been experiencing SOB & thinks he is in Afib. Call transferred to [facility]

## 2022-05-27 NOTE — TELEPHONE ENCOUNTER
Patient to increase his sotalol to 80 mg 1 and half tablet twice a day.  Continue rest of the medications.    Please add him to the schedule either Tuesday or Wednesday in the office next week to be seen.

## 2022-05-27 NOTE — TELEPHONE ENCOUNTER
Spoke with pt, pt admits he is feeling palpitations and irregular heart rate.    Pt recently had a cardioversion, pt had a colonoscopy on Wednesday and since then pt admits to feeling fatigued, SOB occurred once while showering.    Today pt denies any SOB, pt was able to go to gym and walk on treadmill at a slow pace.   Pt denies any chest discomfort.    Pt based his irregular rhythm on his applewatch, instructed pt on how to take his pulse manually which he did and it was irregular.    Please advise

## 2022-05-31 ENCOUNTER — OFFICE VISIT (OUTPATIENT)
Dept: CARDIOLOGY CLINIC | Facility: CLINIC | Age: 72
End: 2022-05-31
Payer: MEDICARE

## 2022-05-31 VITALS
HEART RATE: 74 BPM | WEIGHT: 187.2 LBS | OXYGEN SATURATION: 96 % | HEIGHT: 75 IN | BODY MASS INDEX: 23.28 KG/M2 | DIASTOLIC BLOOD PRESSURE: 70 MMHG | SYSTOLIC BLOOD PRESSURE: 126 MMHG

## 2022-05-31 DIAGNOSIS — Z79.899 ENCOUNTER FOR MONITORING SOTALOL THERAPY: Primary | ICD-10-CM

## 2022-05-31 DIAGNOSIS — I71.2 THORACIC AORTIC ANEURYSM WITHOUT RUPTURE (HCC): ICD-10-CM

## 2022-05-31 DIAGNOSIS — Z51.81 ENCOUNTER FOR MONITORING SOTALOL THERAPY: Primary | ICD-10-CM

## 2022-05-31 DIAGNOSIS — I10 ESSENTIAL HYPERTENSION: ICD-10-CM

## 2022-05-31 DIAGNOSIS — I48.0 PAF (PAROXYSMAL ATRIAL FIBRILLATION) (HCC): ICD-10-CM

## 2022-05-31 PROCEDURE — 93000 ELECTROCARDIOGRAM COMPLETE: CPT | Performed by: INTERNAL MEDICINE

## 2022-05-31 PROCEDURE — 99214 OFFICE O/P EST MOD 30 MIN: CPT | Performed by: INTERNAL MEDICINE

## 2022-05-31 RX ORDER — SOTALOL HYDROCHLORIDE 80 MG/1
TABLET ORAL
Qty: 270 TABLET | Refills: 3 | Status: SHIPPED | OUTPATIENT
Start: 2022-05-31

## 2022-05-31 NOTE — PROGRESS NOTES
[facility] [address] [address]  Cardiology Follow Up    Rhonda Griffin  1950  [id number]      1  Encounter for monitoring sotalol therapy     2  PAF (paroxysmal atrial fibrillation) (HCC)  POCT ECG   3  Essential hypertension     4  Thoracic aortic aneurysm without rupture [facility])         Chief Complaint   Patient presents with    Follow-up     Afib       Interval History:  Patient presents for follow-up visit  Patient had an episode of atrial fibrillation last week which was confirmed by EKG  Patient's sotalol was increased to 120 mg twice a day  Patient had repeat EKG done today which showed sinus rhythm with PACs  Patient feels better  No palpitations  No bleeding issues  He states that he has been compliant with all his present medications  He feels slightly tired    Patient Active Problem List   Diagnosis    Cardiomyopathy ([facility]    Mixed hyperlipidemia    Essential hypertension    Chronic bilateral low back pain without sciatica    Atrial flutter ([facility] [address] [facility]    Neurogenic bladder    Current use of long term anticoagulation    History of total hip arthroplasty    Cough    Thoracic aortic aneurysm without rupture (HCC)    History of skin cancer    Lung nodule < 6cm on CT    History of tobacco use    Family history of colon cancer in mother     Past Medical History:   Diagnosis Date    Atrial fibrillation ([address] )     Benign hypertension     Blood coagulation disorder (HCC)     Colon polyp     Hip arthritis 01/22/2019    Hypertriglyceridemia     Lipid metabolism disorder     Neurogenic bladder     Other postprocedural cardiac functional disturbances following cardiac surgery     Preop examination 01/22/2019     Social History     Socioeconomic History    Marital status: /Civil Union     Spouse name: Not on file    Number of children: Not on file    Years of education: Not on file    Highest education level: Not on file   Occupational History     Comment: employed   Tobacco Use    Smoking status: Former Smoker    Smokeless tobacco: Never Used    Tobacco comment: quite in 1972   Vaping Use    Vaping Use: Never used   Substance and Sexual Activity    Alcohol use:  Yes     Alcohol/week: 1 0 standard drink     Types: 1 Glasses of wine per week     Comment: each night    Drug use: No    Sexual activity: Not on file   Other Topics Concern    Not on file   Social History Narrative    Always uses a seatbelt    Daily caffeine consumption, 2-3 servings per day    Seeing a dentist     Social Determinants of Health     Financial Resource Strain: Not on file   Food Insecurity: Not on file   Transportation Needs: Not on file   Physical Activity: Not on file   Stress: Not on file   Social Connections: Not on file   Intimate Partner Violence: Not on file   Housing Stability: Not on file      Family History   Problem Relation Age of Onset    Diabetes Mother     Colon cancer Mother     Lung cancer Father      Past Surgical History:   Procedure Laterality Date    COLONOSCOPY      HERNIA REPAIR      HIP SURGERY Right 02/2019    KNEE SURGERY Bilateral     LIPOMA RESECTION         Current Outpatient Medications:     acetaminophen (TYLENOL) 650 mg CR tablet, Take 1,300 mg by mouth every 8 (eight) hours as needed, Disp: , Rfl:     apixaban (ELIQUIS) 5 mg, Take 1 tablet (5 mg total) by mouth 2 (two) times a day, Disp: 180 tablet, Rfl: 3    Azelastine HCl 137 MCG/SPRAY SOLN, instill 2 sprays into each nostril twice a day as directed, Disp: 30 mL, Rfl: 1    Cream Base (PCCA LIPODERM BASE) CREA, PCCA Lipoderm Base cream, Disp: , Rfl:     fluticasone (FLONASE) 50 mcg/act nasal spray, fluticasone propionate 50 mcg/actuation nasal spray,suspension  instill 2 sprays into each nostril once daily, Disp: , Rfl:     simvastatin (ZOCOR) 20 mg tablet, Take 1 tablet (20 mg total) by mouth daily at bedtime, Disp: 90 tablet, Rfl: 3    sotalol (BETAPACE) 80 mg tablet, Take 120 mg by mouth 2 (two) times a day, Disp: , Rfl:     tamsulosin (FLOMAX) 0 4 mg, Take 0 4 mg by mouth daily with dinner  , Disp: , Rfl:   Allergies   Allergen Reactions    Ramipril Cough       Labs:  No visits with results within 2 Month(s) from this visit     Latest known visit with results is:   Telemedicine on 02/01/2021   Component Date Value    WBC 12/07/2021 5 35     RBC 12/07/2021 4 85     Hemoglobin 12/07/2021 16 4     Hematocrit 12/07/2021 47 1     MCV 12/07/2021 97     MCH 12/07/2021 33 8     MCHC 12/07/2021 34 8     RDW 12/07/2021 11 9     MPV 12/07/2021 10 9     Platelets 16/05/1383 169     nRBC 12/07/2021 0     Neutrophils Relative 12/07/2021 62     Immat GRANS % 12/07/2021 0     Lymphocytes Relative 12/07/2021 26     Monocytes Relative 12/07/2021 9     Eosinophils Relative 12/07/2021 2     Basophils Relative 12/07/2021 1     Neutrophils Absolute 12/07/2021 3 34     Immature Grans Absolute 12/07/2021 0 02     Lymphocytes Absolute 12/07/2021 1 37     Monocytes Absolute 12/07/2021 0 48     Eosinophils Absolute 12/07/2021 0 11     Basophils Absolute 12/07/2021 0 03     Sodium 12/07/2021 139     Potassium 12/07/2021 4 4     Chloride 12/07/2021 109 (A)    CO2 12/07/2021 28     ANION GAP 12/07/2021 2 (A)    BUN 12/07/2021 17     Creatinine 12/07/2021 0 92     Glucose, Fasting 12/07/2021 98     Calcium 12/07/2021 9 7     AST 12/07/2021 23     ALT 12/07/2021 39     Alkaline Phosphatase 12/07/2021 83     Total Protein 12/07/2021 7 1     Albumin 12/07/2021 3 8     Total Bilirubin 12/07/2021 1 05 (A)    eGFR 12/07/2021 85     Cholesterol 12/07/2021 147     Triglycerides 12/07/2021 175 (A)    HDL, Direct 12/07/2021 50     LDL Calculated 12/07/2021 62     Non-HDL-Chol (CHOL-HDL) 12/07/2021 97      Imaging: Colonoscopy    Result Date: 5/25/2022  Narrative:  [id number] [address] [place] [id number] [phone] DATE OF SERVICE: 5/25/22 PHYSICIAN(S): Attending: Lya Pemberton MD Fellow: No Staff Documented INDICATION: Family history of colon cancer in mother POST-OP DIAGNOSIS: See the impression below  HISTORY: Prior colonoscopy: 5 years ago  BOWEL PREPARATION: Miralax/Dulcolax PREPROCEDURE: Informed consent was obtained for the procedure, including sedation  Risks including but not limited to bleeding, infection, perforation, adverse drug reaction and aspiration were explained in detail  Also explained about less than 100% sensitivity with the exam and other alternatives  The patient was placed in the left lateral decubitus position  DETAILS OF PROCEDURE: Patient was taken to the procedure room where a time out was performed to confirm correct patient and correct procedure  The patient underwent monitored anesthesia care, which was administered by an anesthesia professional  The patient's blood pressure, heart rate, level of consciousness, oxygen and respirations were monitored throughout the procedure  A digital rectal exam was performed  The scope was introduced through the anus and advanced to the cecum  Retroflexion was performed in the rectum  The quality of bowel preparation was evaluated using the [facility] Bowel Preparation Scale with scores of: right colon = 2, transverse colon = 2, left colon = 2  The total BBPS score was 6  Bowel prep was adequate  The patient's estimated blood loss was minimal (<5 mL)  The procedure was not difficult  The patient tolerated the procedure well  There were no apparent complications  ANESTHESIA INFORMATION: ASA: III Anesthesia Type: Anesthesia type not filed in the log   MEDICATIONS: No administrations occurring from 0754 to 0817 on 05/25/22 FINDINGS: Three sessile polyps measuring from 5 mm up to 7 mm in the cecum; completely removed en bloc by cold snare and retrieved specimen Two 2 mm sessile polyps in the proximal transverse colon; performed complete en bloc removal by cold forceps biopsy EVENTS: Procedure Events Event Event Time ENDO CECUM REACHED 5/25/2022  8:01 AM ENDO SCOPE OUT TIME 5/25/2022  8:16 AM SPECIMENS: ID Type Source Tests Collected by Time Destination 1 : cecum x3 Tissue Polyp, Colorectal TISSUE EXAM Antonino Solis MD 5/25/2022  8:13 AM  2 : proximal transverse Tissue Polyp, Colorectal TISSUE EXAM Antonino Solis MD 5/25/2022  8:15 AM  EQUIPMENT: Scope not documented     Impression: 1  3 cecal polyps status post cold snare polypectomies 2  2 proximal transverse polyps status post excisional biopsy removal RECOMMENDATION: Repeat colonoscopy in 3 years due to a personal history of colon polyps, family history of colon cancer Follow-up biopsy results in 2 weeks  Antonino Solis MD       Review of Systems:  Review of Systems   REVIEW OF SYSTEMS:  Constitutional:  Denies fever or chills   Eyes:  Denies change in visual acuity   HENT:  Denies nasal congestion or sore throat   Respiratory:  Denies cough or shortness of breath   Cardiovascular:  Denies chest pain or edema   GI:  Denies abdominal pain, nausea, vomiting, bloody stools or diarrhea   :  Denies dysuria, frequency, difficulty in micturition and nocturia  Musculoskeletal:  Denies back pain or joint pain   Neurologic:  Denies headache, focal weakness or sensory changes   Endocrine:  Denies polyuria or polydipsia   Lymphatic:  Denies swollen glands   Psychiatric:  Denies depression or anxiety     Physical Exam:    /70 (BP Location: Left arm, Patient Position: Sitting, Cuff Size: Standard)   Pulse 74   Ht 6' 3" (1 905 m)   Wt 84 9 kg (187 lb 3 2 oz)   SpO2 96%   BMI 23 40 kg/m²     Physical Exam   PHYSICAL EXAM:  General:  Patient is not in acute distress   Head: Normocephalic, Atraumatic  HEENT:  Both pupils normal-size atraumatic, normocephalic, nonicteric  Neck:  JVP not raised  Trachea central  No carotid bruit  Respiratory:  normal breath sounds no crackles   no rhonchi  Cardiovascular:  Regular rate and rhythm no S3 no murmurs  GI:  Abdomen soft nontender  No organomegaly  Lymphatic:  No cervical or inguinal lymphadenopathy  Neurologic:  Patient is awake alert, oriented   Grossly nonfocal  Extremities no edema    EKG done today shows sinus them first-degree AV block with PACs  Discussion/Summary:  Patient with multiple medical problems who seems to be doing reasonably well from cardiac standpoint  Previous studies reviewed with patient  Medications reviewed and possible side effects discussed  concepts of cardiovascular disease , signs and symptoms of heart disease  Dietary and risk factor modification reinforced  All questions answered  Safety measures reviewed  Patient advised to report any problems prompting medical attention  Risks and benefits  and alternativesof anticoagulation to prevent thromboembolic risk from atrial fibrillation discussed at length  Patient to report any bleeding issues  Patient's tiredness could be related to increasing the dosage of sotalol  If patient is maintaining sinus rhythm, we could consider decreasing the sotalol dosage back after 3 to 4 weeks  Follow-up in 6 months or earlier as needed  Patient is agreeable with the plan of care

## 2022-08-22 ENCOUNTER — APPOINTMENT (EMERGENCY)
Dept: CT IMAGING | Facility: HOSPITAL | Age: 72
End: 2022-08-22
Payer: MEDICARE

## 2022-08-22 ENCOUNTER — TELEPHONE (OUTPATIENT)
Dept: CARDIOLOGY CLINIC | Facility: CLINIC | Age: 72
End: 2022-08-22

## 2022-08-22 ENCOUNTER — HOSPITAL ENCOUNTER (EMERGENCY)
Facility: HOSPITAL | Age: 72
Discharge: HOME | End: 2022-08-22
Attending: EMERGENCY MEDICINE
Payer: MEDICARE

## 2022-08-22 VITALS
OXYGEN SATURATION: 95 % | TEMPERATURE: 97.5 F | DIASTOLIC BLOOD PRESSURE: 71 MMHG | HEART RATE: 61 BPM | RESPIRATION RATE: 17 BRPM | SYSTOLIC BLOOD PRESSURE: 124 MMHG

## 2022-08-22 DIAGNOSIS — H53.40 VISUAL FIELD CUT: ICD-10-CM

## 2022-08-22 DIAGNOSIS — H53.9 TRANSIENT VISION DISTURBANCE OF RIGHT EYE: Primary | ICD-10-CM

## 2022-08-22 LAB
ANION GAP SERPL CALCULATED.3IONS-SCNC: 5 MMOL/L (ref 4–13)
BASOPHILS # BLD AUTO: 0.02 THOUSANDS/ΜL (ref 0–0.1)
BASOPHILS NFR BLD AUTO: 1 % (ref 0–1)
BUN SERPL-MCNC: 13 MG/DL (ref 5–25)
CALCIUM SERPL-MCNC: 9.6 MG/DL (ref 8.3–10.1)
CARDIAC TROPONIN I PNL SERPL HS: 3 NG/L
CHLORIDE SERPL-SCNC: 105 MMOL/L (ref 96–108)
CO2 SERPL-SCNC: 29 MMOL/L (ref 21–32)
CREAT SERPL-MCNC: 0.92 MG/DL (ref 0.6–1.3)
CRP SERPL HS-MCNC: 0.38 MG/L
EOSINOPHIL # BLD AUTO: 0.11 THOUSAND/ΜL (ref 0–0.61)
EOSINOPHIL NFR BLD AUTO: 3 % (ref 0–6)
ERYTHROCYTE [DISTWIDTH] IN BLOOD BY AUTOMATED COUNT: 12.1 % (ref 11.6–15.1)
ERYTHROCYTE [SEDIMENTATION RATE] IN BLOOD: <1 MM/HOUR (ref 0–19)
GFR SERPL CREATININE-BSD FRML MDRD: 83 ML/MIN/1.73SQ M
GLUCOSE SERPL-MCNC: 92 MG/DL (ref 65–140)
HCT VFR BLD AUTO: 46.9 % (ref 36.5–49.3)
HGB BLD-MCNC: 15.9 G/DL (ref 12–17)
IMM GRANULOCYTES # BLD AUTO: 0.02 THOUSAND/UL (ref 0–0.2)
IMM GRANULOCYTES NFR BLD AUTO: 1 % (ref 0–2)
LYMPHOCYTES # BLD AUTO: 1.41 THOUSANDS/ΜL (ref 0.6–4.47)
LYMPHOCYTES NFR BLD AUTO: 33 % (ref 14–44)
MCH RBC QN AUTO: 34.1 PG (ref 26.8–34.3)
MCHC RBC AUTO-ENTMCNC: 33.9 G/DL (ref 31.4–37.4)
MCV RBC AUTO: 101 FL (ref 82–98)
MONOCYTES # BLD AUTO: 0.32 THOUSAND/ΜL (ref 0.17–1.22)
MONOCYTES NFR BLD AUTO: 8 % (ref 4–12)
NEUTROPHILS # BLD AUTO: 2.36 THOUSANDS/ΜL (ref 1.85–7.62)
NEUTS SEG NFR BLD AUTO: 54 % (ref 43–75)
NRBC BLD AUTO-RTO: 0 /100 WBCS
PLATELET # BLD AUTO: 142 THOUSANDS/UL (ref 149–390)
PMV BLD AUTO: 11.2 FL (ref 8.9–12.7)
POTASSIUM SERPL-SCNC: 5 MMOL/L (ref 3.5–5.3)
RBC # BLD AUTO: 4.66 MILLION/UL (ref 3.88–5.62)
SODIUM SERPL-SCNC: 139 MMOL/L (ref 135–147)
WBC # BLD AUTO: 4.24 THOUSAND/UL (ref 4.31–10.16)

## 2022-08-22 PROCEDURE — 93005 ELECTROCARDIOGRAM TRACING: CPT

## 2022-08-22 PROCEDURE — 70498 CT ANGIOGRAPHY NECK: CPT

## 2022-08-22 PROCEDURE — 36415 COLL VENOUS BLD VENIPUNCTURE: CPT | Performed by: EMERGENCY MEDICINE

## 2022-08-22 PROCEDURE — 85025 COMPLETE CBC W/AUTO DIFF WBC: CPT | Performed by: EMERGENCY MEDICINE

## 2022-08-22 PROCEDURE — 70496 CT ANGIOGRAPHY HEAD: CPT

## 2022-08-22 PROCEDURE — 80048 BASIC METABOLIC PNL TOTAL CA: CPT | Performed by: EMERGENCY MEDICINE

## 2022-08-22 PROCEDURE — 84484 ASSAY OF TROPONIN QUANT: CPT | Performed by: EMERGENCY MEDICINE

## 2022-08-22 PROCEDURE — 85652 RBC SED RATE AUTOMATED: CPT | Performed by: EMERGENCY MEDICINE

## 2022-08-22 PROCEDURE — 99285 EMERGENCY DEPT VISIT HI MDM: CPT | Performed by: EMERGENCY MEDICINE

## 2022-08-22 PROCEDURE — G1004 CDSM NDSC: HCPCS

## 2022-08-22 PROCEDURE — 99284 EMERGENCY DEPT VISIT MOD MDM: CPT

## 2022-08-22 PROCEDURE — 86141 C-REACTIVE PROTEIN HS: CPT | Performed by: EMERGENCY MEDICINE

## 2022-08-22 RX ADMIN — IOHEXOL 70 ML: 350 INJECTION, SOLUTION INTRAVENOUS at 14:01

## 2022-08-22 NOTE — TELEPHONE ENCOUNTER
Pt called, approximately a week/half ago pt was bending forward, as pt stood up, his right eye vision was lost for approximately one minute maybe more  Vision did return, pt admits to slight headache throughout the day  Pt denies and syncope, dizziness, numbness in extremities, tinnitus, loss of balance or speech and memory loss  Pt has been unsuccessful getting an appt with opth     It was recommended Pt contact his cardiologist by son who is a physician  Please advise

## 2022-08-22 NOTE — DISCHARGE INSTRUCTIONS
Follow up with an eye doctor for full eye exam, given your episode of vision loss  Follow up with your primary care doctor regarding your headaches  Return if you double vision, visual field cut that is oriented vertically, or for any other concerns

## 2022-08-22 NOTE — ED NOTES
Alert oriented  Discharged to home in no acute distress   No c/o offered     Maddi Beebe, SKYLA  08/22/22 [id number]

## 2022-08-22 NOTE — TELEPHONE ENCOUNTER
Spoke with patient and informed him that Dr Ramya Elliott recommends that he go to the ER for evaluation of his symptoms      Patient understood and stated he will go to ER

## 2022-08-22 NOTE — ED PROVIDER NOTES
History  Chief Complaint   Patient presents with    Visual Field Change     Pt reports a vision change with a headache about a week ago  Pt was advised to come in, no other complaints at this time , denies any vision change at this current time      HPI    Prior to Admission Medications   Prescriptions Last Dose Informant Patient Reported? Taking?    Azelastine HCl 137 MCG/SPRAY SOLN Not Taking at Unknown time Self No No   Sig: instill 2 sprays into each nostril twice a day as directed   Patient not taking: Reported on 2022   Cream Base (PCCA LIPODERM BASE) CREA  Self Yes No   Sig: PCCA Lipoderm Base cream   acetaminophen (TYLENOL) 650 mg CR tablet  Self Yes Yes   Sig: Take 1,300 mg by mouth every 8 (eight) hours as needed   apixaban (ELIQUIS) 5 mg  Self No Yes   Sig: Take 1 tablet (5 mg total) by mouth 2 (two) times a day   fluticasone (FLONASE) 50 mcg/act nasal spray  Self Yes Yes   Sig: fluticasone propionate 50 mcg/actuation nasal spray,suspension   instill 2 sprays into each nostril once daily   simvastatin (ZOCOR) 20 mg tablet  Self No Yes   Sig: Take 1 tablet (20 mg total) by mouth daily at bedtime   sotalol (BETAPACE) 80 mg tablet   No Yes   Si 1/2 tab twice a day   tamsulosin (FLOMAX) 0 4 mg  Self Yes Yes   Sig: Take 0 4 mg by mouth daily with dinner        Facility-Administered Medications: None       Past Medical History:   Diagnosis Date    Atrial fibrillation (HCC)     Benign hypertension     Blood coagulation disorder (HCC)     Colon polyp     Hip arthritis 2019    Hypertriglyceridemia     Lipid metabolism disorder     Neurogenic bladder     Other postprocedural cardiac functional disturbances following cardiac surgery     Preop examination 2019       Past Surgical History:   Procedure Laterality Date    COLONOSCOPY      HERNIA REPAIR      HIP SURGERY Right 2019    KNEE SURGERY Bilateral     LIPOMA RESECTION         Family History   Problem Relation Age of Onset  Diabetes Mother     Colon cancer Mother     Lung cancer Father      I have reviewed and agree with the history as documented  E-Cigarette/Vaping    E-Cigarette Use Never User      E-Cigarette/Vaping Substances    Nicotine No     THC No     CBD No     Flavoring No     Other No     Unknown No      Social History     Tobacco Use    Smoking status: Former Smoker    Smokeless tobacco: Never Used    Tobacco comment: quite in 1972   Vaping Use    Vaping Use: Never used   Substance Use Topics    Alcohol use: Yes     Alcohol/week: 1 0 standard drink     Types: 1 Glasses of wine per week     Comment: each night    Drug use: No       Review of Systems    Physical Exam  Physical Exam  Vitals and nursing note reviewed  Constitutional:       General: He is not in acute distress  Appearance: He is well-developed  HENT:      Head: Normocephalic and atraumatic  Eyes:      General: No visual field deficit  Extraocular Movements: Extraocular movements intact  Conjunctiva/sclera: Conjunctivae normal       Pupils: Pupils are equal, round, and reactive to light  Visual Fields: Right eye visual fields normal and left eye visual fields normal    Neck:      Trachea: No tracheal deviation  Cardiovascular:      Rate and Rhythm: Normal rate and regular rhythm  Heart sounds: Normal heart sounds  Comments: Several varicose veins to bilateral lower legs  Pulmonary:      Effort: Pulmonary effort is normal  No respiratory distress  Breath sounds: Normal breath sounds  Abdominal:      General: There is no distension  Palpations: Abdomen is soft  Tenderness: There is no abdominal tenderness  Musculoskeletal:      Cervical back: Normal range of motion  Right lower leg: No edema  Left lower leg: No edema  Skin:     General: Skin is warm and dry  Neurological:      Mental Status: He is alert and oriented to person, place, and time        GCS: GCS eye subscore is 4  GCS verbal subscore is 5  GCS motor subscore is 6  Cranial Nerves: Cranial nerves are intact  No cranial nerve deficit, dysarthria or facial asymmetry  Sensory: Sensation is intact  No sensory deficit  Motor: Motor function is intact  No weakness  Coordination: Coordination normal  Finger-Nose-Finger Test and Heel to [facility] [place] Test normal       Deep Tendon Reflexes:      Reflex Scores:       Bicep reflexes are 1+ on the right side and 1+ on the left side  Brachioradialis reflexes are 1+ on the right side and 1+ on the left side  Patellar reflexes are 1+ on the right side and 1+ on the left side    Psychiatric:         Behavior: Behavior normal          Vital Signs  ED Triage Vitals   Temperature Pulse Respirations Blood Pressure SpO2   08/22/22 1145 08/22/22 1145 08/22/22 1145 08/22/22 1230 08/22/22 1145   97 5 °F (36 4 °C) 68 16 127/79 98 %      Temp Source Heart Rate Source Patient Position - Orthostatic VS BP Location FiO2 (%)   08/22/22 1145 08/22/22 1145 08/22/22 1145 08/22/22 1145 --   Oral Monitor Sitting Left arm       Pain Score       08/22/22 1145       No Pain           Vitals:    08/22/22 1230 08/22/22 1330 08/22/22 1430 08/22/22 1500   BP: 127/79 103/58 120/70 124/71   Pulse: 69 61 59 61   Patient Position - Orthostatic VS: Sitting Sitting Sitting          Visual Acuity  Visual Acuity    Flowsheet Row Most Recent Value   L Pupil Size (mm) 4   R Pupil Size (mm) 4          ED Medications  Medications   iohexol (OMNIPAQUE) 350 MG/ML injection (MULTI-DOSE) 70 mL (70 mL Intravenous Given 8/22/22 1401)       Diagnostic Studies  Results Reviewed     Procedure Component Value Units Date/Time    Basic metabolic panel [[id number]] Collected: 08/22/22 1249    Lab Status: Final result Specimen: Blood from Arm, Left Updated: 08/22/22 1343     Sodium 139 mmol/L      Potassium 5 0 mmol/L      Chloride 105 mmol/L      CO2 29 mmol/L      ANION GAP 5 mmol/L      BUN 13 mg/dL      Creatinine 0 92 mg/dL      Glucose 92 mg/dL      Calcium 9 6 mg/dL      eGFR 83 ml/min/1 73sq m     Narrative:      National Kidney Disease Foundation guidelines for Chronic Kidney Disease (CKD):     Stage 1 with normal or high GFR (GFR > 90 mL/min/1 73 square meters)    Stage 2 Mild CKD (GFR = 60-89 mL/min/1 73 square meters)    Stage 3A Moderate CKD (GFR = 45-59 mL/min/1 73 square meters)    Stage 3B Moderate CKD (GFR = 30-44 mL/min/1 73 square meters)    Stage 4 Severe CKD (GFR = 15-29 mL/min/1 73 square meters)    Stage 5 End Stage CKD (GFR <15 mL/min/1 73 square meters)  Note: GFR calculation is accurate only with a steady state creatinine    High sensitivity CRP [[id number]] Collected: 08/22/22 1249    Lab Status: Final result Specimen: Blood from Arm, Left Updated: 08/22/22 1343     CRP, High Sensitivity 0 38 mg/L     Narrative:            HsCRP Level       Relative Risk           <1 0 mg/L          Low           1 0 to 3 0 mg/L    Average           >3 0 mg/L          High        HS Troponin 0hr (reflex protocol) [[id number]]  (Normal) Collected: 08/22/22 1249    Lab Status: Final result Specimen: Blood from Arm, Left Updated: 08/22/22 1338     hs TnI 0hr 3 ng/L     Sedimentation rate, automated [[id number]]  (Normal) Collected: 08/22/22 1249    Lab Status: Final result Specimen: Blood from Arm, Left Updated: 08/22/22 1328     Sed Rate <1 mm/hour     CBC and differential [[id number]]  (Abnormal) Collected: 08/22/22 1249    Lab Status: Final result Specimen: Blood from Arm, Left Updated: 08/22/22 1307     WBC 4 24 Thousand/uL      RBC 4 66 Million/uL      Hemoglobin 15 9 g/dL      Hematocrit 46 9 %       fL      MCH 34 1 pg      MCHC 33 9 g/dL      RDW 12 1 %      MPV 11 2 fL      Platelets 402 Thousands/uL      nRBC 0 /100 WBCs      Neutrophils Relative 54 %      Immat GRANS % 1 %      Lymphocytes Relative 33 %      Monocytes Relative 8 %      Eosinophils Relative 3 %      Basophils Relative 1 % Neutrophils Absolute 2 36 Thousands/µL      Immature Grans Absolute 0 02 Thousand/uL      Lymphocytes Absolute 1 41 Thousands/µL      Monocytes Absolute 0 32 Thousand/µL      Eosinophils Absolute 0 11 Thousand/µL      Basophils Absolute 0 02 Thousands/µL                  CTA head and neck with and without contrast   Final Result by Ke Souza MD (08/22 6126)      1  No acute intracranial CT abnormality  2   Patent major vasculature of [place] of [place] without high-grade stenosis  No aneurysm  3   Unremarkable CT angiogram of the neck  Workstation performed: [id number]                    Procedures  ECG 12 Lead Documentation Only    Date/Time: 8/22/2022 1:02 PM  Performed by: Junaid Harper MD  Authorized by: Junaid Harper MD     Indications / Diagnosis:  Transient vision loss  ECG reviewed by me, the ED Provider: yes    Patient location:  ED  Previous ECG:     Previous ECG:  Compared to current    Comparison ECG info:  11/29/18    Similarity:  Changes noted (PACs)  Interpretation:     Interpretation: non-specific    Rate:     ECG rate:  65    ECG rate assessment: normal    Rhythm:     Rhythm: sinus rhythm and A-V block    Ectopy:     Ectopy: PAC    QRS:     QRS axis:  Normal    QRS intervals:  Normal  Conduction:     Conduction: abnormal      Abnormal conduction: 1st degree    ST segments:     ST segments:  Normal  T waves:     T waves: normal               ED Course                               SBIRT 22yo+    Flowsheet Row Most Recent Value   SBIRT (23 yo +)    In order to provide better care to our patients, we are screening all of our patients for alcohol and drug use  Would it be okay to ask you these screening questions? Yes Filed at: 08/22/2022 3649   Initial Alcohol Screen: US AUDIT-C     1  How often do you have a drink containing alcohol? 6 Filed at: 08/22/2022 1351   2   How many drinks containing alcohol do you have on a typical day you are drinking? 0 Filed at: 08/22/2022 1351   3b  FEMALE Any Age, or MALE 65+: How often do you have 4 or more drinks on one occassion? 0 Filed at: 08/22/2022 1351   Audit-C Score 6 Filed at: 08/22/2022 1351   ROMEO: How many times in the past year have you    Used an illegal drug or used a prescription medication for non-medical reasons? Never Filed at: 08/22/2022 1351                    MDM  Number of Diagnoses or Management Options  Transient vision disturbance of right eye: new and requires workup  Visual field cut: new and requires workup  Diagnosis management comments: This is a 11year-old male who presents here today at the recommendation of his cardiologist with concern for possible stroke  The patient says about a week and half ago he had an episode where he bent over and when he sat up felt like the vision in his right eye was blurry, followed by complete loss of vision in the upper half of his visual field lasting for about 2 minutes  He said this resolved and has not recurred since then  He tried to call his eye doctor that day and the following day but never heard back, and given resolution of symptoms stopped trying to call them  He has a history of intermittent headaches at baseline, usually located to the right temporal region, and feels like they are slightly more frequent than normal since the episode  They resolved with taking Tylenol  He denies any other vision changes, increased frequency of floaters, focal weakness numbness, gait instability, confusion, difficulty speaking or swallowing  He says he sees the eye doctor for contacts, never had any other problems with his eye  He says temporal artery has been prominent "for years" on that side and has not recently changed    He says last week he was hiking with family members, and his son is a doctor who told him that as soon as he got back home he should call his cardiologist   He did call his cardiologist today, who advised that he come in for evaluation  He has not had any recurrence of symptoms, including with position changes, head movements, or exertion  He has been taking his medications as prescribed, including his Eliquis  He denies any recent palpitations or concerns that he has been in atrial fibrillation  Review of systems:  Otherwise negative unless stated above    He is well-appearing, in no acute distress  He does have mild tenderness to the right temporal region, with a visible temporal artery, though no direct arterial tenderness  Exam is otherwise unremarkable  Given horizontal visual field cut, this is more concerning for primary eye problem as opposed to CVA/TIA, however given history and concerns by his cardiologist we will check lab work and CT scan to evaluate  Given intermittent temporal artery headache with visible blood vessel we will check lab work to evaluate for temporal arteritis  Lab work shows mild leukopenia of uncertain etiology, and is otherwise unremarkable  CT scan shows no acute abnormalities  I discussed with the patient findings, management at home, follow up and indications for return, and he expresses understanding of this plan         Amount and/or Complexity of Data Reviewed  Clinical lab tests: reviewed and ordered  Tests in the radiology section of CPT®: ordered and reviewed  Independent visualization of images, tracings, or specimens: yes        Disposition  Final diagnoses:   Transient vision disturbance of right eye   Visual field cut     Time reflects when diagnosis was documented in both MDM as applicable and the Disposition within this note     Time User Action Codes Description Comment    8/22/2022  2:49 PM Itzel Love Add [H53 9] Transient vision disturbance of right eye     8/22/2022  2:50 PM Itzel Love Add [H53 40] Visual field cut       ED Disposition     ED Disposition   Discharge    Condition   Good    Date/Time   Mon Aug 22, 2022  2:48 PM Comment   Benjy Mae discharge to home/self care  Follow-up Information     Follow up With Specialties Details Why Contact Info    your eye doctor  Schedule an appointment as soon as possible for a visit  to follow up on your vision     [address] [facility]  Schedule an appointment as soon as possible for a visit  to follow up on your vision [place]  Philomena Fisher [address] [facility]    Kleber Baptiste MD Ophthalmology Schedule an appointment as soon as possible for a visit  to follow up on your vision [address]  [address] [address]  [phone]      Merline Pressley MD Family Medicine Schedule an appointment as soon as possible for a visit  to follow up on your [address] [address]  [phone]            Discharge Medication List as of 8/22/2022  2:53 PM      CONTINUE these medications which have NOT CHANGED    Details   acetaminophen (TYLENOL) 650 mg CR tablet Take 1,300 mg by mouth every 8 (eight) hours as needed, Historical Med      apixaban (ELIQUIS) 5 mg Take 1 tablet (5 mg total) by mouth 2 (two) times a day, Starting Thu 9/9/2021, Until Mon 8/22/2022, Normal      fluticasone (FLONASE) 50 mcg/act nasal spray fluticasone propionate 50 mcg/actuation nasal spray,suspension   instill 2 sprays into each nostril once daily, Historical Med      simvastatin (ZOCOR) 20 mg tablet Take 1 tablet (20 mg total) by mouth daily at bedtime, Starting Mon 11/22/2021, Normal      sotalol (BETAPACE) 80 mg tablet 1 1/2 tab twice a day, Normal      tamsulosin (FLOMAX) 0 4 mg Take 0 4 mg by mouth daily with dinner  , Starting Sun 10/12/2014, Historical Med      Azelastine HCl 137 MCG/SPRAY SOLN instill 2 sprays into each nostril twice a day as directed, Normal      Cream Base (PCCA LIPODERM BASE) CREA PCCA Lipoderm Base cream, Historical Med             No discharge procedures on file      PDMP Review       Value Time User PDMP Reviewed  Yes 5/18/2020  8:43 AM Kate Beebe PA-C          ED Provider  Electronically Signed by           Stefano Rdz MD  08/22/22 3051

## 2022-08-24 LAB
ATRIAL RATE: 65 BPM
P AXIS: 86 DEGREES
PR INTERVAL: 252 MS
QRS AXIS: 81 DEGREES
QRSD INTERVAL: 88 MS
QT INTERVAL: 408 MS
QTC INTERVAL: 424 MS
T WAVE AXIS: 79 DEGREES
VENTRICULAR RATE: 65 BPM

## 2022-08-24 PROCEDURE — 93010 ELECTROCARDIOGRAM REPORT: CPT | Performed by: INTERNAL MEDICINE

## 2022-08-29 NOTE — PROGRESS NOTES
8/30/2022      Chief Complaint   Patient presents with    Urinary Retention     Assessment and Plan    81 y o  male new patient     1  Neurogenic bladder  2  BPH s/p Urolift with outside provider several years ago  - Reviewed increasing CIC to 3-4 times daily due to elevated PVR today  He states he always has difficulties voiding volitionally when in urologic office however reports at home he has adequate urine volumes throughout the day  - Reports he had PSA done this AM at [facility], will have results faxed to our office  VENANCIO recently performed by prior urologist last week per his report  As long as PSA is normal, will continue to monitor this on yearly basis  - Will obtain renal US as well as BMP to assess kidneys  If there is any hydronephrosis or altered renal function, I reviewed that I definitely would recommend increasing CIC or if he has any recurrent UTIs  Discussed possibility of repeating work-up with our office with cystoscopy/TRUS and UDS  He defers at this time and may consider in the future  Will monitor for imaging and lab work results  As long as this is normal, patient could follow up in 1 year with repeat renal US and BMP  History of Present Illness  Fatoumata Boles is a 81 y o  male here for new patient evaluation of reported neurogenic bladder  Patient currently performs CIC twice daily, once in the morning and once at night for volumes of 500 cc  He states he voids volitionally good urine volumes throughout the day  He previously was followed by Dr Lennie Cyr and then more recently Dr Kushal Roe  No full records to review  He states he just saw Dr Kushal Roe last week and had full prostate cancer screening  He was diagnosed with idiopathic neurogenic bladder/urinary retention  He does have history of Urolift performed several years ago  He unfortunately continued to have urinary retention despite this  Prior to Urolift he had full work-up with cystoscopy and UDS   He states that work-up revealed a 30 g prostate  UDS revealed an atonic bladder per his report  He reports he did not feel the sensation to void until bladder was filled to over 400 cc  He has been managed on CIC since that time  He denies any history of neurologic conditions  Denies any other  surgical manipulation  Denies any dysuria, hematuria or flank pain  Denies any family history of  malignancy  Medical history includes cardiomyopathy, hypertension, hyperlipidemia, chronic low back pain, atrial flutter, thoracic aortic aneurysm  He is on Eliquis  Urine dip + leukocytes  Negative blood or nitrites  Review of Systems   Constitutional: Negative for chills and fever  Respiratory: Negative for shortness of breath  Cardiovascular: Negative for chest pain  Gastrointestinal: Negative for abdominal pain  Genitourinary: Positive for difficulty urinating  Negative for dysuria, flank pain and hematuria  Neurological: Negative for dizziness             AUA SYMPTOM SCORE    Flowsheet Row Most Recent Value   AUA SYMPTOM SCORE    How often have you had a sensation of not emptying your bladder completely after you finished urinating? 5 (P)     How often have you had to urinate again less than two hours after you finished urinating? 3 (P)     How often have you found you stopped and started again several times when you urinate? 0 (P)     How often have you found it difficult to postpone urination? 1 (P)     How often have you had a weak urinary stream? 3 (P)     How often have you had to push or strain to begin urination? 0 (P)     How many times did you most typically get up to urinate from the time you went to bed at night until the time you got up in the morning? 1 (P)     Quality of Life: If you were to spend the rest of your life with your urinary condition just the way it is now, how would you feel about that? 4 (P)     AUA SYMPTOM SCORE 13 (P)              Past Medical History  Past Medical History:   Diagnosis Date  Atrial fibrillation (HCC)     Benign hypertension     Blood coagulation disorder (HCC)     Colon polyp     Hip arthritis 01/22/2019    Hypertriglyceridemia     Lipid metabolism disorder     Neurogenic bladder     Other postprocedural cardiac functional disturbances following cardiac surgery     Preop examination 01/22/2019       Past Social History  Past Surgical History:   Procedure Laterality Date    COLONOSCOPY      HERNIA REPAIR      HIP SURGERY Right 02/2019    KNEE SURGERY Bilateral     LIPOMA RESECTION       Social History     Tobacco Use   Smoking Status Former Smoker   Smokeless Tobacco Never Used   Tobacco Comment    quite in 1972       Past Family History  Family History   Problem Relation Age of Onset    Diabetes Mother     Colon cancer Mother     Lung cancer Father        Past Social history  Social History     Socioeconomic History    Marital status: /Civil Union     Spouse name: Not on file    Number of children: Not on file    Years of education: Not on file    Highest education level: Not on file   Occupational History     Comment: employed   Tobacco Use    Smoking status: Former Smoker    Smokeless tobacco: Never Used    Tobacco comment: quite in 1972   Vaping Use    Vaping Use: Never used   Substance and Sexual Activity    Alcohol use:  Yes     Alcohol/week: 1 0 standard drink     Types: 1 Glasses of wine per week     Comment: each night    Drug use: No    Sexual activity: Not on file   Other Topics Concern    Not on file   Social History Narrative    Always uses a seatbelt    Daily caffeine consumption, 2-3 servings per day    Seeing a dentist     Social Determinants of Health     Financial Resource Strain: Not on file   Food Insecurity: Not on file   Transportation Needs: Not on file   Physical Activity: Not on file   Stress: Not on file   Social Connections: Not on file   Intimate Partner Violence: Not on file   Housing Stability: Not on file       Current Medications  Current Outpatient Medications   Medication Sig Dispense Refill    acetaminophen (TYLENOL) 650 mg CR tablet Take 1,300 mg by mouth every 8 (eight) hours as needed      Cream Base (PCCA LIPODERM BASE) CREA PCCA Lipoderm Base cream      fluticasone (FLONASE) 50 mcg/act nasal spray fluticasone propionate 50 mcg/actuation nasal spray,suspension   instill 2 sprays into each nostril once daily      simvastatin (ZOCOR) 20 mg tablet Take 1 tablet (20 mg total) by mouth daily at bedtime 90 tablet 3    sotalol (BETAPACE) 80 mg tablet 1 1/2 tab twice a day 270 tablet 3    tamsulosin (FLOMAX) 0 4 mg Take 0 4 mg by mouth daily with dinner        apixaban (ELIQUIS) 5 mg Take 1 tablet (5 mg total) by mouth 2 (two) times a day (Patient not taking: Reported on 8/30/2022) 180 tablet 3    Azelastine HCl 137 MCG/SPRAY SOLN instill 2 sprays into each nostril twice a day as directed (Patient not taking: Reported on 8/22/2022) 30 mL 1    ciprofloxacin (CIPRO) 500 mg tablet take 1 tablet by mouth every 12 hours for 7 days (Patient not taking: Reported on 8/30/2022)      clindamycin (CLEOCIN) 150 mg capsule clindamycin HCl 150 mg capsule   take 1 tablet by mouth three times a day with meals (Patient not taking: Reported on 8/30/2022)      methocarbamol (ROBAXIN) 500 mg tablet methocarbamol 500 mg tablet   take 1 tablet by mouth four times a day for 8 days (Patient not taking: Reported on 8/30/2022)      oxyCODONE (ROXICODONE) 5 immediate release tablet oxycodone 5 mg tablet   take 1 tablet every 4 hours if needed for severe pain on day 1 th     (REFER TO PRESCRIPTION NOTES)   (Patient not taking: Reported on 8/30/2022)      polyethylene glycol (GLYCOLAX) 17 GM/SCOOP powder polyethylene glycol 3350 17 gram oral powder packet   MIX 1 PACKET AS DIRECTED DAILY FOR 3 DAYS THEN ONCE DAILY AS NEEDDD FOR CONSTIPATION FOR 4 DAYS (Patient not taking: Reported on 8/30/2022)       No current facility-administered medications for this visit  Allergies  Allergies   Allergen Reactions    Ramipril Cough         The following portions of the patient's history were reviewed and updated as appropriate: allergies, current medications, past medical history, past social history, past surgical history and problem list       Vitals  Vitals:    08/30/22 1353   BP: 110/72   Pulse: 77   SpO2: 97%   Weight: 81 6 kg (180 lb)   Height: 6' 3" (1 905 m)           Physical Exam  Physical Exam  Constitutional:       Appearance: Normal appearance  HENT:      Head: Normocephalic and atraumatic  Right Ear: External ear normal       Left Ear: External ear normal    Eyes:      General: No scleral icterus  Conjunctiva/sclera: Conjunctivae normal    Cardiovascular:      Pulses: Normal pulses  Pulmonary:      Effort: Pulmonary effort is normal    Musculoskeletal:         General: Normal range of motion  Cervical back: Normal range of motion  Neurological:      General: No focal deficit present  Mental Status: He is alert and oriented to person, place, and time  Psychiatric:         Mood and Affect: Mood normal          Behavior: Behavior normal          Thought Content:  Thought content normal          Judgment: Judgment normal            Results  Recent Results (from the past 1 hour(s))   POCT Measure PVR    Collection Time: 08/30/22  1:55 PM   Result Value Ref Range    POST-VOID RESIDUAL VOLUME, ML  mL   POCT urine dip    Collection Time: 08/30/22  1:58 PM   Result Value Ref Range    LEUKOCYTE ESTERASE,UA +     NITRITE,UA -     SL AMB POCT UROBILINOGEN 0 2     POCT URINE PROTEIN -      PH,UA 6 5     BLOOD,UA -     SPECIFIC GRAVITY,UA 1 000     KETONES,UA -     BILIRUBIN,UA -     GLUCOSE, UA -      COLOR,UA Yellow     CLARITY,UA Clear    ]  No results found for: PSA  Lab Results   Component Value Date    CALCIUM 9 6 08/22/2022    K 5 0 08/22/2022    CO2 29 08/22/2022     08/22/2022    BUN 13 08/22/2022    CREATININE 0 92 08/22/2022     Lab Results   Component Value Date    WBC 4 24 (L) 08/22/2022    HGB 15 9 08/22/2022    HCT 46 9 08/22/2022     (H) 08/22/2022     (L) 08/22/2022           Orders  Orders Placed This Encounter   Procedures    US kidney and bladder     Standing Status:   Future     Standing Expiration Date:   8/30/2026     Scheduling Instructions:      "Prep required if being scheduled in conjunction with other studies, refer to those examination's Preps first before scheduling  All patients for US Kidney and Bladder they must drink 24 oz of water 60 minutes before your scheduled appointment time  This test requires you to have a FULL bladder  Please do not urinate before your test             If you have difficulty holding your urine please arrive 30 minutes early to complete your drinking prep  You may take all of your medications for this test             Pediatric Preps-birth to 12 years             Infants and toddlers to 1 year of age- no drinking prep or restrictions             Toddlers (33 year old)- just give liquids , any clear liquid or juice, and hour prior to the exam             3years old and older- drink liquids (approx  16 to 24 oz and no soda) 30 minutes before, try to not let the child void until the test is completed            Please bring your insurance cards, a form of photo ID and a list of yourmedications with you  Arrive 15 minutes prior to your appointment time in order to register  If you need to have lab work or a urinalysis, please do this AFTER your ultrasound  "            To schedule this appointment, please contact Central Scheduling at [phone]  Order Specific Question:   Is a Renal Artery Doppler also being requested in addition to the Kidney/Renal ultrasound ? Answer:   No    Basic metabolic panel     This is a patient instruction: Patient fasting for 8 hours or longer recommended       Standing Status:   Future Standing Expiration Date:   8/30/2023    POCT Measure PVR    POCT urine dip       Blaze Morse PA-C

## 2022-08-30 ENCOUNTER — OFFICE VISIT (OUTPATIENT)
Dept: UROLOGY | Facility: CLINIC | Age: 72
End: 2022-08-30
Payer: MEDICARE

## 2022-08-30 VITALS
WEIGHT: 180 LBS | HEIGHT: 75 IN | HEART RATE: 77 BPM | SYSTOLIC BLOOD PRESSURE: 110 MMHG | BODY MASS INDEX: 22.38 KG/M2 | OXYGEN SATURATION: 97 % | DIASTOLIC BLOOD PRESSURE: 72 MMHG

## 2022-08-30 DIAGNOSIS — N31.9 NEUROGENIC BLADDER: Primary | ICD-10-CM

## 2022-08-30 DIAGNOSIS — R33.9 URINARY RETENTION: ICD-10-CM

## 2022-08-30 LAB
POST-VOID RESIDUAL VOLUME, ML POC: 999 ML
SL AMB  POCT GLUCOSE, UA: NORMAL
SL AMB LEUKOCYTE ESTERASE,UA: NORMAL
SL AMB POCT BILIRUBIN,UA: NORMAL
SL AMB POCT BLOOD,UA: NORMAL
SL AMB POCT CLARITY,UA: CLEAR
SL AMB POCT COLOR,UA: YELLOW
SL AMB POCT KETONES,UA: NORMAL
SL AMB POCT NITRITE,UA: NORMAL
SL AMB POCT PH,UA: 6.5
SL AMB POCT SPECIFIC GRAVITY,UA: 1
SL AMB POCT URINE PROTEIN: NORMAL
SL AMB POCT UROBILINOGEN: 0.2

## 2022-08-30 PROCEDURE — 81002 URINALYSIS NONAUTO W/O SCOPE: CPT | Performed by: PHYSICIAN ASSISTANT

## 2022-08-30 PROCEDURE — 99203 OFFICE O/P NEW LOW 30 MIN: CPT | Performed by: PHYSICIAN ASSISTANT

## 2022-08-30 PROCEDURE — 51798 US URINE CAPACITY MEASURE: CPT | Performed by: PHYSICIAN ASSISTANT

## 2022-08-30 RX ORDER — METHOCARBAMOL 500 MG/1
TABLET, FILM COATED ORAL
COMMUNITY
End: 2022-10-25 | Stop reason: ALTCHOICE

## 2022-08-30 RX ORDER — POLYETHYLENE GLYCOL 3350 17 G/17G
POWDER, FOR SOLUTION ORAL
COMMUNITY
End: 2022-10-25 | Stop reason: ALTCHOICE

## 2022-08-30 RX ORDER — CIPROFLOXACIN 500 MG/1
TABLET, FILM COATED ORAL
COMMUNITY
Start: 2022-08-04 | End: 2022-10-25

## 2022-08-30 RX ORDER — CLINDAMYCIN HYDROCHLORIDE 150 MG/1
CAPSULE ORAL
COMMUNITY
End: 2022-10-25 | Stop reason: ALTCHOICE

## 2022-08-30 RX ORDER — OXYCODONE HYDROCHLORIDE 5 MG/1
TABLET ORAL
COMMUNITY
End: 2022-10-25 | Stop reason: ALTCHOICE

## 2022-09-09 ENCOUNTER — HOSPITAL ENCOUNTER (OUTPATIENT)
Dept: ULTRASOUND IMAGING | Facility: CLINIC | Age: 72
Discharge: HOME | End: 2022-09-09
Payer: MEDICARE

## 2022-09-09 DIAGNOSIS — R33.9 URINARY RETENTION: ICD-10-CM

## 2022-09-09 LAB
BUN SERPL-MCNC: 18 MG/DL (ref 8–27)
BUN/CREAT SERPL: 20 (ref 10–24)
CALCIUM SERPL-MCNC: 9.9 MG/DL (ref 8.6–10.2)
CHLORIDE SERPL-SCNC: 103 MMOL/L (ref 96–106)
CO2 SERPL-SCNC: 26 MMOL/L (ref 20–29)
CREAT SERPL-MCNC: 0.92 MG/DL (ref 0.76–1.27)
EGFR: 89 ML/MIN/1.73
GLUCOSE SERPL-MCNC: 88 MG/DL (ref 65–99)
POTASSIUM SERPL-SCNC: 5 MMOL/L (ref 3.5–5.2)
SODIUM SERPL-SCNC: 141 MMOL/L (ref 134–144)

## 2022-09-09 PROCEDURE — 76770 US EXAM ABDO BACK WALL COMP: CPT

## 2022-09-12 ENCOUNTER — TELEPHONE (OUTPATIENT)
Dept: UROLOGY | Facility: CLINIC | Age: 72
End: 2022-09-12

## 2022-09-12 NOTE — TELEPHONE ENCOUNTER
----- Message from Lorri Medellin PA-C sent at 9/12/2022  3:52 PM EDT -----  Renal function is normal   Ultrasound is negative for any obstruction or hydronephrosis  No urologic findings other than some enlargement to the prostate which is normal   Incidental finding of some spleen enlargement and recommend follow-up with hi  s PCP in regards to this  If patient wishes he can follow-up in 6 months for symptom reassessment or if stable he could follow-up in 1 year with repeat imaging and lab work

## 2022-09-15 NOTE — TELEPHONE ENCOUNTER
Pt requesting call back in reference to previous vm from Ashleigh jimenez  Please follow up with pt

## 2022-09-15 NOTE — TELEPHONE ENCOUNTER
Called and left VM for patient to call our office back to go over imaging results   Office number left in VM

## 2022-10-25 ENCOUNTER — OFFICE VISIT (OUTPATIENT)
Dept: FAMILY MEDICINE CLINIC | Facility: CLINIC | Age: 72
End: 2022-10-25
Payer: MEDICARE

## 2022-10-25 VITALS
BODY MASS INDEX: 22.5 KG/M2 | TEMPERATURE: 97.9 F | WEIGHT: 181 LBS | SYSTOLIC BLOOD PRESSURE: 108 MMHG | OXYGEN SATURATION: 96 % | DIASTOLIC BLOOD PRESSURE: 68 MMHG | HEART RATE: 86 BPM | HEIGHT: 75 IN

## 2022-10-25 DIAGNOSIS — Z85.828 HISTORY OF SKIN CANCER: ICD-10-CM

## 2022-10-25 DIAGNOSIS — R42 DIZZINESS: ICD-10-CM

## 2022-10-25 DIAGNOSIS — Z00.00 ENCOUNTER FOR ANNUAL WELLNESS VISIT (AWV) IN MEDICARE PATIENT: ICD-10-CM

## 2022-10-25 DIAGNOSIS — N31.9 NEUROGENIC BLADDER: ICD-10-CM

## 2022-10-25 DIAGNOSIS — E78.2 MIXED HYPERLIPIDEMIA: ICD-10-CM

## 2022-10-25 DIAGNOSIS — I42.9 CARDIOMYOPATHY, UNSPECIFIED TYPE (HCC): ICD-10-CM

## 2022-10-25 DIAGNOSIS — I48.4 ATYPICAL ATRIAL FLUTTER (HCC): Primary | ICD-10-CM

## 2022-10-25 DIAGNOSIS — Z79.01 CURRENT USE OF LONG TERM ANTICOAGULATION: ICD-10-CM

## 2022-10-25 PROBLEM — M54.50 CHRONIC BILATERAL LOW BACK PAIN WITHOUT SCIATICA: Status: RESOLVED | Noted: 2018-04-24 | Resolved: 2022-10-25

## 2022-10-25 PROBLEM — G89.29 CHRONIC BILATERAL LOW BACK PAIN WITHOUT SCIATICA: Status: RESOLVED | Noted: 2018-04-24 | Resolved: 2022-10-25

## 2022-10-25 PROCEDURE — 99215 OFFICE O/P EST HI 40 MIN: CPT | Performed by: STUDENT IN AN ORGANIZED HEALTH CARE EDUCATION/TRAINING PROGRAM

## 2022-10-25 PROCEDURE — G0438 PPPS, INITIAL VISIT: HCPCS | Performed by: STUDENT IN AN ORGANIZED HEALTH CARE EDUCATION/TRAINING PROGRAM

## 2022-10-25 PROCEDURE — 93000 ELECTROCARDIOGRAM COMPLETE: CPT | Performed by: STUDENT IN AN ORGANIZED HEALTH CARE EDUCATION/TRAINING PROGRAM

## 2022-10-25 NOTE — PATIENT INSTRUCTIONS
Medicare Preventive Visit Patient Instructions  Thank you for completing your Welcome to Medicare Visit or Medicare Annual Wellness Visit today  Your next wellness visit will be due in one year (10/26/2023)  The screening/preventive services that you may require over the next 5-10 years are detailed below  Some tests may not apply to you based off risk factors and/or age  Screening tests ordered at today's visit but not completed yet may show as past due  Also, please note that scanned in results may not display below  Preventive Screenings:  Service Recommendations Previous Testing/Comments   Colorectal Cancer Screening  · Colonoscopy    · Fecal Occult Blood Test (FOBT)/Fecal Immunochemical Test (FIT)  · Fecal DNA/Cologuard Test  · Flexible Sigmoidoscopy Age: 39-70 years old   Colonoscopy: every 10 years (May be performed more frequently if at higher risk)  OR  FOBT/FIT: every 1 year  OR  Cologuard: every 3 years  OR  Sigmoidoscopy: every 5 years  Screening may be recommended earlier than age 39 if at higher risk for colorectal cancer  Also, an individualized decision between you and your healthcare provider will decide whether screening between the ages of 74-80 would be appropriate   Colonoscopy: 05/25/2022  FOBT/FIT: Not on file  Cologuard: Not on file  Sigmoidoscopy: Not on file    Screening Current     Prostate Cancer Screening Individualized decision between patient and health care provider in men between ages of 53-78   Medicare will cover every 12 months beginning on the day after your 50th birthday PSA: No results in last 5 years           Hepatitis C Screening Once for adults born between 1945 and 1965  More frequently in patients at high risk for Hepatitis C Hep C Antibody: 11/07/2017    Screening Current   Diabetes Screening 1-2 times per year if you're at risk for diabetes or have pre-diabetes Fasting glucose: 98 mg/dL (12/7/2021)  A1C: No results in last 5 years (No results in last 5 years)  Screening Current   Cholesterol Screening Once every 5 years if you don't have a lipid disorder  May order more often based on risk factors  Lipid panel: 12/07/2021  Screening Not Indicated  History Lipid Disorder      Other Preventive Screenings Covered by Medicare:  1  Abdominal Aortic Aneurysm (AAA) Screening: covered once if your at risk  You're considered to be at risk if you have a family history of AAA or a male between the age of 73-68 who smoking at least 100 cigarettes in your lifetime  2  Lung Cancer Screening: covers low dose CT scan once per year if you meet all of the following conditions: (1) Age 50-69; (2) No signs or symptoms of lung cancer; (3) Current smoker or have quit smoking within the last 15 years; (4) You have a tobacco smoking history of at least 20 pack years (packs per day x number of years you smoked); (5) You get a written order from a healthcare provider  3  Glaucoma Screening: covered annually if you're considered high risk: (1) You have diabetes OR (2) Family history of glaucoma OR (3)  aged 48 and older OR (3)  American aged 72 and older  3  Osteoporosis Screening: covered every 2 years if you meet one of the following conditions: (1) Have a vertebral abnormality; (2) On glucocorticoid therapy for more than 3 months; (3) Have primary hyperparathyroidism; (4) On osteoporosis medications and need to assess response to drug therapy  5  HIV Screening: covered annually if you're between the age of 12-76  Also covered annually if you are younger than 13 and older than 72 with risk factors for HIV infection  For pregnant patients, it is covered up to 3 times per pregnancy      Immunizations:  Immunization Recommendations   Influenza Vaccine Annual influenza vaccination during flu season is recommended for all persons aged >= 6 months who do not have contraindications   Pneumococcal Vaccine   * Pneumococcal conjugate vaccine = PCV13 (Prevnar 13), PCV15 (Vaxneuvance), PCV20 (Prevnar 20)  * Pneumococcal polysaccharide vaccine = PPSV23 (Pneumovax) Adults 2364 years old: 1-3 doses may be recommended based on certain risk factors  Adults 72 years old: 1-2 doses may be recommended based off what pneumonia vaccine you previously received   Hepatitis B Vaccine 3 dose series if at intermediate or high risk (ex: diabetes, end stage renal disease, liver disease)   Tetanus (Td) Vaccine - COST NOT COVERED BY MEDICARE PART B Following completion of primary series, a booster dose should be given every 10 years to maintain immunity against tetanus  Td may also be given as tetanus wound prophylaxis  Tdap Vaccine - COST NOT COVERED BY MEDICARE PART B Recommended at least once for all adults  For pregnant patients, recommended with each pregnancy  Shingles Vaccine (Shingrix) - COST NOT COVERED BY MEDICARE PART B  2 shot series recommended in those aged 48 and above     Health Maintenance Due:      Topic Date Due   • Colorectal Cancer Screening  05/24/2025   • Hepatitis C Screening  Completed     Immunizations Due:  There are no preventive care reminders to display for this patient  Advance Directives   What are advance directives? Advance directives are legal documents that state your wishes and plans for medical care  These plans are made ahead of time in case you lose your ability to make decisions for yourself  Advance directives can apply to any medical decision, such as the treatments you want, and if you want to donate organs  What are the types of advance directives? There are many types of advance directives, and each state has rules about how to use them  You may choose a combination of any of the following:  · Living will: This is a written record of the treatment you want  You can also choose which treatments you do not want, which to limit, and which to stop at a certain time  This includes surgery, medicine, IV fluid, and tube feedings     · Durable power of  for [facility]): This is a written record that states who you want to make healthcare choices for you when you are unable to make them for yourself  This person, called a proxy, is usually a family member or a friend  You may choose more than 1 proxy  · Do not resuscitate (DNR) order:  A DNR order is used in case your heart stops beating or you stop breathing  It is a request not to have certain forms of treatment, such as CPR  A DNR order may be included in other types of advance directives  · Medical directive: This covers the care that you want if you are in a coma, near death, or unable to make decisions for yourself  You can list the treatments you want for each condition  Treatment may include pain medicine, surgery, blood transfusions, dialysis, IV or tube feedings, and a ventilator (breathing machine)  · Values history: This document has questions about your views, beliefs, and how you feel and think about life  This information can help others choose the care that you would choose  Why are advance directives important? An advance directive helps you control your care  Although spoken wishes may be used, it is better to have your wishes written down  Spoken wishes can be misunderstood, or not followed  Treatments may be given even if you do not want them  An advance directive may make it easier for your family to make difficult choices about your care  Fall Prevention    Fall prevention  includes ways to make your home and other areas safer  It also includes ways you can move more carefully to prevent a fall  Health conditions that cause changes in your blood pressure, vision, or muscle strength and coordination may increase your risk for falls  Medicines may also increase your risk for falls if they make you dizzy, weak, or sleepy  Fall prevention tips:   · Stand or sit up slowly  · Use assistive devices as directed  · Wear shoes that fit well and have soles that   · Wear a personal alarm  · Stay active  · Manage your medical conditions  Home Safety Tips:  · Add items to prevent falls in the bathroom  · Keep paths clear  · Install bright lights in your home  · Keep items you use often on shelves within reach  · Paint or place reflective tape on the edges of your stairs  Alcohol Use and Your Health    Drinking too much can harm your health  Excessive alcohol use leads to about 88,000 death in the [place] each year, and shortens the life of those who diet by almost 30 years  Further, excessive drinking cost the economy $249 billion in 2010  Most excessive drinkers are not alcohol dependent  Excessive alcohol use has immediate effects that increase the risk of many harmful health conditions  These are most often the result of binge drinking  Over time, excessive alcohol use can lead to the development of chronic diseases and other series health problems  What is considered a "drink"? Excessive alcohol use includes:  · Binge Drinking: For women, 4 or more drinks consumed on one occasion  For men, 5 or more drinks consumed on one occasion  · Heavy Drinking: For women, 8 or more drinks per week  For men, 15 or more drinks per week  · Any alcohol used by pregnant women  · Any alcohol used by those under the age of 21 years    If you choose to drink, do so in moderation:  · Do not drink at all if you are under the age of 24, or if you are or may be pregnant, or have health problems that could be made worse by drinking    · For women, up to 1 drink per day  · For men, up to 2 drinks a day    No one should begin drinking or drink more frequently based on potential health benefits    Short-Term Health Risks:  · Injuries: motor vehicle crashes, falls, drownings, burns  · Violence: homicide, suicide, sexual assault, intimate partner violence  · Alcohol poisoning  · Reproductive health: risky sexual behaviors, unintended prengnacy, sexually transmitted diseases, miscarriage, stillbirth, fetal alcohol syndrome    Long-Term Health Risks:  · Chronic diseases: high blood pressure, heart disease, stroke, liver disease, digestive problems  · Cancers: breast, mouth and throat, liver, colon  · Learning and memory problems: dementia, poor school performance  · Mental health: depression, anxiety, insomnia  · Social problems: lost productivity, family problems, unemployment  · Alcohol dependence    For support and more information:  · [facility]stance [facility] and [facility] [address] [place]  Web Address: https://"[employer]/    · Alcoholics Anonymous        Web Address: http://www garcia info/    https://www cdc gov/alcohol/fact-sheets/alcohol-use htm     © [facility] 2018 Information is for End User's use only and may not be sold, redistributed or otherwise used for commercial purposes   All illustrations and images included in CareNotes® are the copyrighted property of [employer]  or [address] [place] [address]

## 2022-10-25 NOTE — PROGRESS NOTES
Assessment and Plan:     Problem List Items Addressed This Visit        Cardiovascular and Mediastinum    Cardiomyopathy ([facility] [address] )    Atrial flutter ([facility] [address] ) - Primary    Relevant Orders    Comprehensive metabolic panel    CBC and differential    Magnesium       Other    Mixed hyperlipidemia    Relevant Orders    Lipid panel    Neurogenic bladder    Current use of long term anticoagulation    History of skin cancer    Relevant Orders    Ambulatory Referral to Dermatology      Other Visit Diagnoses     Encounter for annual wellness visit (AWV) in Medicare patient        Dizziness        Relevant Orders    POCT ECG (Completed)      ekg showed afib however converted to sinus during exam  Recommend he continue medications and follow up closely with cardiology        Depression Screening and Follow-up Plan: Patient was screened for depression during today's encounter  They screened negative with a PHQ-2 score of 0  Falls Plan of Care: balance, strength, and gait training instructions were provided  Preventive health issues were discussed with patient, and age appropriate screening tests were ordered as noted in patient's After Visit Summary  Personalized health advice and appropriate referrals for health education or preventive services given if needed, as noted in patient's After Visit Summary  History of Present Illness:     Patient presents for a Medicare Wellness Visit    Patient coming in for awv  States he had an episode today at the gym where he felt some dizziness and fatigue  His apple watch did state he was in afib  Has been going in and out of afib despite being on sotolol  Denies any chest pain, decreased exercise tolerance, or other concerns  Still self caths BID and drains 600-700 mL's       Would like a referral to see derm, hx of skin cancer and would like yearly skin check with derm     Patient Care Team:  Renata Prado MD as PCP - General (Family Medicine)  Enrike Jang MD     Review of Systems:     Review of Systems   Constitutional: Positive for fatigue  Negative for chills and fever  HENT: Negative for rhinorrhea and sore throat  Eyes: Negative for visual disturbance  Respiratory: Negative for cough and shortness of breath  Cardiovascular: Negative for chest pain and palpitations  Gastrointestinal: Negative for abdominal pain, constipation, diarrhea, nausea and vomiting  Genitourinary: Negative for difficulty urinating, dysuria and frequency  Musculoskeletal: Negative for arthralgias and myalgias  Skin: Negative for color change and rash  Neurological: Positive for dizziness  Negative for weakness and headaches          Problem List:     Patient Active Problem List   Diagnosis   • Cardiomyopathy [facility])   • Mixed hyperlipidemia   • Essential hypertension   • Atrial flutter (HCC)   • Neurogenic bladder   • Current use of long term anticoagulation   • History of total hip arthroplasty   • Cough   • Thoracic aortic aneurysm without rupture   • History of skin cancer   • Lung nodule < 6cm on CT   • History of tobacco use   • Family history of colon cancer in mother      Past Medical and Surgical History:     Past Medical History:   Diagnosis Date   • Atrial fibrillation [facility])    • Benign hypertension    • Blood coagulation disorder ([facility] [address] )    • Chronic bilateral low back pain without sciatica 4/24/2018   • Colon polyp    • Hip arthritis 01/22/2019   • Hypertriglyceridemia    • Lipid metabolism disorder    • Neurogenic bladder    • Other postprocedural cardiac functional disturbances following cardiac surgery    • Preop examination 01/22/2019     Past Surgical History:   Procedure Laterality Date   • COLONOSCOPY     • HERNIA REPAIR     • HIP SURGERY Right 02/2019   • KNEE SURGERY Bilateral    • LIPOMA RESECTION        Family History:     Family History   Problem Relation Age of Onset   • Diabetes Mother    • Colon cancer Mother    • Lung cancer Father       Social History:     Social History     Socioeconomic History   • Marital status: /Civil Union     Spouse name: None   • Number of children: None   • Years of education: None   • Highest education level: None   Occupational History     Comment: employed   Tobacco Use   • Smoking status: Former Smoker   • Smokeless tobacco: Never Used   • Tobacco comment: quite in 1972   Vaping Use   • Vaping Use: Never used   Substance and Sexual Activity   • Alcohol use: Yes     Alcohol/week: 1 0 standard drink     Types: 1 Glasses of wine per week     Comment: each night   • Drug use: No   • Sexual activity: None   Other Topics Concern   • None   Social History Narrative    Always uses a seatbelt    Daily caffeine consumption, 2-3 servings per day    Seeing a dentist     Social Determinants of Health     Financial Resource Strain: Low Risk    • Difficulty of Paying Living Expenses: Not hard at all   Food Insecurity: Not on file   Transportation Needs: No Transportation Needs   • Lack of Transportation (Medical): No   • Lack of Transportation (Non-Medical):  No   Physical Activity: Not on file   Stress: Not on file   Social Connections: Not on file   Intimate Partner Violence: Not on file   Housing Stability: Not on file      Medications and Allergies:     Current Outpatient Medications   Medication Sig Dispense Refill   • acetaminophen (TYLENOL) 650 mg CR tablet Take 1,300 mg by mouth every 8 (eight) hours as needed     • apixaban (ELIQUIS) 5 mg Take 1 tablet (5 mg total) by mouth 2 (two) times a day 180 tablet 3   • Cream Base (PCCA LIPODERM BASE) CREA PCCA Lipoderm Base cream     • fluticasone (FLONASE) 50 mcg/act nasal spray fluticasone propionate 50 mcg/actuation nasal spray,suspension   instill 2 sprays into each nostril once daily     • simvastatin (ZOCOR) 20 mg tablet Take 1 tablet (20 mg total) by mouth daily at bedtime 90 tablet 3   • sotalol (BETAPACE) 80 mg tablet 1 1/2 tab twice a day 270 tablet 3   • tamsulosin (FLOMAX) 0 4 mg Take 0 8 mg by mouth daily with dinner       No current facility-administered medications for this visit  Allergies   Allergen Reactions   • Ramipril Cough      Immunizations:     Immunization History   Administered Date(s) Administered   • COVID-19 MODERNA VACC 0 5 ML IM 02/26/2021, 03/26/2021, 12/08/2021   • COVID-19 PFIZER VACCINE 0 3 ML IM 10/03/2022   • INFLUENZA 10/01/2021   • Influenza, high dose seasonal 0 7 mL 10/03/2022   • Pneumococcal Conjugate 13-Valent 09/28/2020   • Pneumococcal Polysaccharide PPV23 10/01/2021      Health Maintenance:         Topic Date Due   • Colorectal Cancer Screening  05/24/2025   • Hepatitis C Screening  Completed     There are no preventive care reminders to display for this patient  Medicare Screening Tests and Risk Assessments:     Martinez Acosta is here for his Subsequent Wellness visit  Last Medicare Wellness visit information reviewed, patient interviewed and updates made to the record today  Health Risk Assessment:   Patient rates overall health as very good  Patient feels that their physical health rating is same  Patient is very satisfied with their life  Eyesight was rated as same  Hearing was rated as same  Patient feels that their emotional and mental health rating is same  Patients states they are never, rarely angry  Patient states they are sometimes unusually tired/fatigued  Pain experienced in the last 7 days has been some  Patient's pain rating has been 4/10  Patient states that he has experienced no weight loss or gain in last 6 months  Depression Screening:   PHQ-2 Score: 0      Fall Risk Screening: In the past year, patient has experienced: history of falling in past year    Number of falls: 2 or more  Injured during fall?: No    Feels unsteady when standing or walking?: No    Worried about falling?: No      Home Safety:  Patient does not have trouble with stairs inside or outside of their home   Patient has working smoke alarms and has working carbon monoxide detector  Home safety hazards include: none  Nutrition:   Current diet is Low Cholesterol, Low Saturated Fat and No Added Salt  Medications:   Patient is currently taking over-the-counter supplements  OTC medications include: Vitamins: Multi; D, C, Zinc, Fish Oil, Co-Q10, Melatonin  Patient is able to manage medications  Activities of Daily Living (ADLs)/Instrumental Activities of Daily Living (IADLs):   Walk and transfer into and out of bed and chair?: Yes  Dress and groom yourself?: Yes    Bathe or shower yourself?: Yes    Feed yourself? Yes  Do your laundry/housekeeping?: Yes  Manage your money, pay your bills and track your expenses?: Yes  Make your own meals?: Yes    Do your own shopping?: Yes    Previous Hospitalizations:   Any hospitalizations or ED visits within the last 12 months?: Yes    How many hospitalizations have you had in the last year?: 1-2    Hospitalization Comments: [facility] for partial loss of eyesight in right eye    Advance Care Planning:   Living will: Yes    Durable POA for healthcare: Yes    Advanced directive: Yes      PREVENTIVE SCREENINGS      Cardiovascular Screening:    General: Screening Not Indicated and History Lipid Disorder      Diabetes Screening:     General: Screening Current      Colorectal Cancer Screening:     General: Screening Current      Abdominal Aortic Aneurysm (AAA) Screening:    Risk factors include: age between 73-67 yo and tobacco use        Lung Cancer Screening:     General: Screening Not Indicated      Hepatitis C Screening:    General: Screening Current    Screening, Brief Intervention, and Referral to Treatment (SBIRT)    Screening  Typical number of drinks in a day: 1  Typical number of drinks in a week: 6  Interpretation: Low risk drinking behavior      AUDIT-C Screenin) How often did you have a drink containing alcohol in the past year? 4 or more times a week  2) How many drinks did you have on a typical day when you were drinking in the past year? 1 to 2  3) How often did you have 6 or more drinks on one occasion in the past year? never    AUDIT-C Score: 4  Interpretation: Score 4-12 (male): POSITIVE screen for alcohol misuse    AUDIT Screenin) How often during the last year have you found that you were not able to stop drinking once you had started? 0 - never  5) How often during the last year have you failed to do what was normally expected from you because of drinking? 0 - never  6) How often during the last year have you needed a first drink in the morning to get yourself going after a heavy drinking session? 0 - never  7) How often during the last year have you had a feeling of guilt or remorse after drinking? 0 - never  8) How often during the last year have you been unable to remember what happened the night before because you had been drinking? 0 - never  9) Have you or someone else been injured as a result of your drinking? 0 - no  10) Has a relative or friend or a doctor or another health worker been concerned about your drinking or suggested you cut down? 0 - no    AUDIT Score: 4  Interpretation: Low risk alcohol consumption    Single Item Drug Screening:  How often have you used an illegal drug (including marijuana) or a prescription medication for non-medical reasons in the past year? never    Single Item Drug Screen Score: 0  Interpretation: Negative screen for possible drug use disorder    Brief Intervention  Healthy alcohol use/limits discussed  Other Counseling Topics:   Car/seat belt/driving safety and sunscreen  No exam data present     Physical Exam:     /68 (BP Location: Left arm, Patient Position: Sitting, Cuff Size: Adult)   Pulse 86   Temp 97 9 °F (36 6 °C) (Tympanic)   Ht 6' 3" (1 905 m)   Wt 82 1 kg (181 lb)   SpO2 96%   BMI 22 62 kg/m²     Physical Exam  Constitutional:       General: He is not in acute distress  Appearance: Normal appearance  He is not ill-appearing     HENT:      Head: Normocephalic and atraumatic  Right Ear: Tympanic membrane, ear canal and external ear normal       Left Ear: Tympanic membrane, ear canal and external ear normal       Nose: Nose normal       Mouth/Throat:      Mouth: Mucous membranes are moist       Pharynx: Oropharynx is clear  No oropharyngeal exudate or posterior oropharyngeal erythema  Eyes:      General: No scleral icterus  Right eye: No discharge  Left eye: No discharge  Extraocular Movements: Extraocular movements intact  Conjunctiva/sclera: Conjunctivae normal       Pupils: Pupils are equal, round, and reactive to light  Cardiovascular:      Rate and Rhythm: Normal rate and regular rhythm  Pulses: Normal pulses  Heart sounds: Normal heart sounds  No murmur heard  Pulmonary:      Effort: Pulmonary effort is normal  No respiratory distress  Breath sounds: Normal breath sounds  Abdominal:      General: Bowel sounds are normal       Palpations: Abdomen is soft  Tenderness: There is no abdominal tenderness  Musculoskeletal:         General: Normal range of motion  Cervical back: Normal range of motion and neck supple  Comments: Varicose veins in BLE   Lymphadenopathy:      Cervical: No cervical adenopathy  Skin:     General: Skin is warm and dry  Capillary Refill: Capillary refill takes less than 2 seconds  Neurological:      General: No focal deficit present  Mental Status: He is alert and oriented to person, place, and time  Mental status is at baseline  Cranial Nerves: No cranial nerve deficit     Psychiatric:         Mood and Affect: Mood normal           Jaki Ken MD

## 2022-11-01 ENCOUNTER — TELEPHONE (OUTPATIENT)
Dept: FAMILY MEDICINE CLINIC | Facility: CLINIC | Age: 72
End: 2022-11-01

## 2022-11-01 DIAGNOSIS — D69.6 THROMBOCYTOPENIA (HCC): Primary | ICD-10-CM

## 2022-11-01 LAB
ALBUMIN SERPL-MCNC: 4.5 G/DL (ref 3.8–4.8)
ALBUMIN/GLOB SERPL: 2.4 {RATIO} (ref 1.2–2.2)
ALP SERPL-CCNC: 80 IU/L (ref 44–121)
ALT SERPL-CCNC: 26 IU/L (ref 0–44)
AST SERPL-CCNC: 25 IU/L (ref 0–40)
BASOPHILS # BLD AUTO: 0 X10E3/UL (ref 0–0.2)
BASOPHILS NFR BLD AUTO: 1 %
BILIRUB SERPL-MCNC: 0.6 MG/DL (ref 0–1.2)
BUN SERPL-MCNC: 19 MG/DL (ref 8–27)
BUN/CREAT SERPL: 19 (ref 10–24)
CALCIUM SERPL-MCNC: 9.3 MG/DL (ref 8.6–10.2)
CHLORIDE SERPL-SCNC: 104 MMOL/L (ref 96–106)
CHOLEST SERPL-MCNC: 126 MG/DL (ref 100–199)
CO2 SERPL-SCNC: 24 MMOL/L (ref 20–29)
CREAT SERPL-MCNC: 0.99 MG/DL (ref 0.76–1.27)
EGFR: 82 ML/MIN/1.73
EOSINOPHIL # BLD AUTO: 0.1 X10E3/UL (ref 0–0.4)
EOSINOPHIL NFR BLD AUTO: 2 %
ERYTHROCYTE [DISTWIDTH] IN BLOOD BY AUTOMATED COUNT: 12 % (ref 11.6–15.4)
GLOBULIN SER-MCNC: 1.9 G/DL (ref 1.5–4.5)
GLUCOSE SERPL-MCNC: 90 MG/DL (ref 70–99)
HCT VFR BLD AUTO: 41.1 % (ref 37.5–51)
HDLC SERPL-MCNC: 50 MG/DL
HGB BLD-MCNC: 14 G/DL (ref 13–17.7)
IMM GRANULOCYTES # BLD: 0 X10E3/UL (ref 0–0.1)
IMM GRANULOCYTES NFR BLD: 0 %
LDLC SERPL CALC-MCNC: 60 MG/DL (ref 0–99)
LYMPHOCYTES # BLD AUTO: 1.2 X10E3/UL (ref 0.7–3.1)
LYMPHOCYTES NFR BLD AUTO: 32 %
MAGNESIUM SERPL-MCNC: 2 MG/DL (ref 1.6–2.3)
MCH RBC QN AUTO: 34.4 PG (ref 26.6–33)
MCHC RBC AUTO-ENTMCNC: 34.1 G/DL (ref 31.5–35.7)
MCV RBC AUTO: 101 FL (ref 79–97)
MONOCYTES # BLD AUTO: 0.3 X10E3/UL (ref 0.1–0.9)
MONOCYTES NFR BLD AUTO: 8 %
NEUTROPHILS # BLD AUTO: 2.2 X10E3/UL (ref 1.4–7)
NEUTROPHILS NFR BLD AUTO: 57 %
PLATELET # BLD AUTO: 121 X10E3/UL (ref 150–450)
POTASSIUM SERPL-SCNC: 4.6 MMOL/L (ref 3.5–5.2)
PROT SERPL-MCNC: 6.4 G/DL (ref 6–8.5)
RBC # BLD AUTO: 4.07 X10E6/UL (ref 4.14–5.8)
SL AMB VLDL CHOLESTEROL CALC: 16 MG/DL (ref 5–40)
SODIUM SERPL-SCNC: 138 MMOL/L (ref 134–144)
TRIGL SERPL-MCNC: 84 MG/DL (ref 0–149)
WBC # BLD AUTO: 3.8 X10E3/UL (ref 3.4–10.8)

## 2022-11-03 ENCOUNTER — CONSULT (OUTPATIENT)
Dept: DERMATOLOGY | Facility: CLINIC | Age: 72
End: 2022-11-03

## 2022-11-03 VITALS — HEIGHT: 75 IN | WEIGHT: 178 LBS | BODY MASS INDEX: 22.13 KG/M2

## 2022-11-03 DIAGNOSIS — D17.20 LIPOMA OF UPPER EXTREMITY, UNSPECIFIED LATERALITY: ICD-10-CM

## 2022-11-03 DIAGNOSIS — D18.01 CHERRY ANGIOMA: ICD-10-CM

## 2022-11-03 DIAGNOSIS — L57.8 ACTINIC SKIN DAMAGE: ICD-10-CM

## 2022-11-03 DIAGNOSIS — Z85.89 HISTORY OF SQUAMOUS CELL CARCINOMA: ICD-10-CM

## 2022-11-03 DIAGNOSIS — D22.70 MULTIPLE BENIGN MELANOCYTIC NEVI OF UPPER AND LOWER EXTREMITIES AND TRUNK: Primary | ICD-10-CM

## 2022-11-03 DIAGNOSIS — D22.60 MULTIPLE BENIGN MELANOCYTIC NEVI OF UPPER AND LOWER EXTREMITIES AND TRUNK: Primary | ICD-10-CM

## 2022-11-03 DIAGNOSIS — L57.0 ACTINIC KERATOSIS: ICD-10-CM

## 2022-11-03 DIAGNOSIS — L82.1 SEBORRHEIC KERATOSES: ICD-10-CM

## 2022-11-03 DIAGNOSIS — D22.5 MULTIPLE BENIGN MELANOCYTIC NEVI OF UPPER AND LOWER EXTREMITIES AND TRUNK: Primary | ICD-10-CM

## 2022-11-03 DIAGNOSIS — L81.4 LENTIGO: ICD-10-CM

## 2022-11-03 DIAGNOSIS — L85.3 XEROSIS OF SKIN: ICD-10-CM

## 2022-11-03 NOTE — PROGRESS NOTES
[facility] Note     Patient Name: Pallavi Oneal  Encounter Date: 11/03/2022     Have you been cared for by a [facility] in the last 3 years and, if so, which description applies to you? NO  I am considered a "new" patient and must complete all patient intake questions  I am MALE/not capable of bearing children  REVIEW OF SYSTEMS:  Have you recently had or currently have any of the following? · Recent fever or chills? No  · Any non-healing wound? No   PAST MEDICAL HISTORY:  Have you personally ever had or currently have any of the following? If "YES," then please provide more detail  · Skin cancer (such as Melanoma, Basal Cell Carcinoma, Squamous Cell Carcinoma? YES, squamous cell carcinoma   · Tuberculosis, HIV/AIDS, Hepatitis B or C: No  · Systemic Immunosuppression such as Diabetes, Biologic or Immunotherapy, Chemotherapy, Organ Transplantation, Bone Marrow Transplantation No  · Radiation Treatment No   FAMILY HISTORY:  Any "first degree relatives" (parent, brother, sister, or child) with the following? • Skin Cancer, Pancreatic or Other Cancer? No   PATIENT EXPERIENCE:    • Do you want the Dermatologist to perform a COMPLETE skin exam today including a clinical examination under the "bra and underwear" areas? Yes  • If necessary, do we have your permission to call and leave a detailed message on your Preferred Phone number that includes your specific medical information?   Yes      Allergies   Allergen Reactions   • Ramipril Cough      Current Outpatient Medications:   •  acetaminophen (TYLENOL) 650 mg CR tablet, Take 1,300 mg by mouth every 8 (eight) hours as needed, Disp: , Rfl:   •  apixaban (ELIQUIS) 5 mg, Take 1 tablet (5 mg total) by mouth 2 (two) times a day, Disp: 180 tablet, Rfl: 3  •  Cream Base (PCCA LIPODERM BASE) CREA, PCCA Lipoderm Base cream, Disp: , Rfl:   •  fluticasone (FLONASE) 50 mcg/act nasal spray, fluticasone propionate 50 mcg/actuation nasal spray,suspension  instill 2 sprays into each nostril once daily, Disp: , Rfl:   •  simvastatin (ZOCOR) 20 mg tablet, Take 1 tablet (20 mg total) by mouth daily at bedtime, Disp: 90 tablet, Rfl: 3  •  sotalol (BETAPACE) 80 mg tablet, 1 1/2 tab twice a day, Disp: 270 tablet, Rfl: 3  •  tamsulosin (FLOMAX) 0 4 mg, Take 0 8 mg by mouth daily with dinner, Disp: , Rfl:           • Whom besides the patient is providing clinical information about today's encounter?   o NO ADDITIONAL HISTORIAN (patient alone provided history)    Physical Exam and Assessment/Plan by Diagnosis:    1  MELANOCYTIC NEVI ("Moles")    Physical Exam:  • Anatomic Location Affected:   Mostly on sun-exposed areas of the trunk and extremities  • Morphological Description:  Scattered, 1-4mm round to ovoid, symmetrical-appearing, even bordered, skin colored to dark brown macules/papules, mostly in sun-exposed areas  • Pertinent Positives:  • Pertinent Negatives:    Assessment and Plan:  Based on a thorough discussion of this condition and the management approach to it (including a comprehensive discussion of the known risks, side effects and potential benefits of treatment), the patient (family) agrees to implement the following specific plan:  • When outside we recommend using a wide brim hat, sunglasses, long sleeve and pants, sunscreen with SPF 13+ with reapplication every 2 hours, or SPF specific clothing   • Benign, reassured  • Annual skin check     Melanocytic Nevi  Melanocytic nevi ("moles") are tan or brown, raised or flat areas of the skin which have an increased number of melanocytes  Melanocytes are the cells in our body which make pigment and account for skin color  Some moles are present at birth (I e , "congenital nevi"), while others come up later in life (i e , "acquired nevi")  The sun can stimulate the body to make more moles  Sunburns are not the only thing that triggers more moles  Chronic sun exposure can do it too  Clinically distinguishing a healthy mole from melanoma may be difficult, even for experienced dermatologists  The "ABCDE's" of moles have been suggested as a means of helping to alert a person to a suspicious mole and the possible increased risk of melanoma  The suggestions for raising alert are as follows:    Asymmetry: Healthy moles tend to be symmetric, while melanomas are often asymmetric  Asymmetry means if you draw a line through the mole, the two halves do not match in color, size, shape, or surface texture  Asymmetry can be a result of rapid enlargement of a mole, the development of a raised area on a previously flat lesion, scaling, ulceration, bleeding or scabbing within the mole  Any mole that starts to demonstrate "asymmetry" should be examined promptly by a board certified dermatologist      Border: Healthy moles tend to have discrete, even borders  The border of a melanoma often blends into the normal skin and does not sharply delineate the mole from normal skin  Any mole that starts to demonstrate "uneven borders" should be examined promptly by a board certified dermatologist      Color: Healthy moles tend to be one color throughout  Melanomas tend to be made up of different colors ranging from dark black, blue, white, or red  Any mole that demonstrates a color change should be examined promptly by a board certified dermatologist      Diameter: Healthy moles tend to be smaller than 0 6 cm in size; an exception are "congenital nevi" that can be larger  Melanomas tend to grow and can often be greater than 0 6 cm (1/4 of an inch, or the size of a pencil eraser)  This is only a guideline, and many normal moles may be larger than 0 6 cm without being unhealthy    Any mole that starts to change in size (small to bigger or bigger to smaller) should be examined promptly by a board certified dermatologist      Evolving: Healthy moles tend to "stay the same "  Melanomas may often show signs of change or evolution such as a change in size, shape, color, or elevation  Any mole that starts to itch, bleed, crust, burn, hurt, or ulcerate or demonstrate a change or evolution should be examined promptly by a board certified dermatologist         2  LENTIGO    Physical Exam:  • Anatomic Location Affected:  Trunk and arms   • Morphological Description:  Light brown macules  • Pertinent Positives:  • Pertinent Negatives:    Assessment and Plan:  Based on a thorough discussion of this condition and the management approach to it (including a comprehensive discussion of the known risks, side effects and potential benefits of treatment), the patient (family) agrees to implement the following specific plan:  • When outside we recommend using a wide brim hat, sunglasses, long sleeve and pants, sunscreen with SPF 70+ with reapplication every 2 hours, or SPF specific clothing       What is a lentigo? A lentigo is a pigmented flat or slightly raised lesion with a clearly defined edge  Unlike an ephelis (freckle), it does not fade in the winter months  There are several kinds of lentigo  The name lentigo originally referred to its appearance resembling a small lentil  The plural of lentigo is lentigines, although “lentigos” is also in common use  Who gets lentigines? Lentigines can affect males and females of all ages and races  Solar lentigines are especially prevalent in fair skinned adults  Lentigines associated with syndromes are present at birth or arise during childhood  What causes lentigines? Common forms of lentigo are due to exposure to ultraviolet radiation:  • Sun damage including sunburn   • Indoor tanning   • Phototherapy, especially photochemotherapy (PUVA)    Ionizing radiation, eg radiation therapy, can also cause lentigines  Several familial syndromes associated with widespread lentigines originate from mutations in Edwin-MAP kinase, mTOR signaling and PTEN pathways      What is the treatment for lentigines? Most lentigines are left alone  Attempts to lighten them may not be successful  The following approaches are used:  • SPF 50+ broad-spectrum sunscreen   • Hydroquinone bleaching cream   • Alpha hydroxy acids   • Vitamin C   • Retinoids   • Azelaic acid   • Chemical peels  Individual lesions can be permanently removed using:  • Cryotherapy   • Intense pulsed light   • Pigment lasers    How can lentigines be prevented? Lentigines associated with exposure ultraviolet radiation can be prevented by very careful sun protection  Clothing is more successful at preventing new lentigines than are sunscreens  What is the outlook for lentigines? Lentigines usually persist  They may increase in number with age and sun exposure  Some in sun-protected sites may fade and disappear  3  ULLOA ANGIOMAS    Physical Exam:  • Anatomic Location Affected:  trunk  • Morphological Description:  Scattered cherry red, 1-4 mm papules  • Pertinent Positives:  • Pertinent Negatives:    Assessment and Plan:  Based on a thorough discussion of this condition and the management approach to it (including a comprehensive discussion of the known risks, side effects and potential benefits of treatment), the patient (family) agrees to implement the following specific plan:  • Monitor for changes  • Benign, reassured    Assessment and Plan:    Cherry angioma, also known as Danay Cesar spots, are benign vascular skin lesions  A "cherry angioma" is a firm red, blue or purple papule, 0 1-1 cm in diameter  When thrombosed, they can appear black in colour until evaluated with a dermatoscope when the red or purple colour is more easily seen  Cherry angioma may develop on any part of the body but most often appear on the scalp, face, lips and trunk  An angioma is due to proliferating endothelial cells; these are the cells that line the inside of a blood vessel      Angiomas can arise in early life or later in life; the most common type of angioma is a cherry angioma  Cherry angiomas are very common in males and females of any age or race  They are more noticeable in white skin than in skin of colour  They markedly increase in number from about the age of 36  There may be a family history of similar lesions  Eruptive cherry angiomas have been rarely reported to be associated with internal malignancy  The cause of angiomas is unknown  Genetic analysis of cherry angiomas has shown that they frequently carry specific somatic missense mutations in the GNAQ and GNA11 (Q209H) genes, which are involved in other vascular and melanocytic proliferations  4  SEBORRHEIC KERATOSIS; NON-INFLAMED    Physical Exam:  • Anatomic Location Affected:  trunk  • Morphological Description:  Flat and raised, waxy, smooth to warty textured, yellow to brownish-grey to dark brown to blackish, discrete, "stuck-on" appearing papules  • Pertinent Positives:  • Pertinent Negatives:    Assessment and Plan:  Based on a thorough discussion of this condition and the management approach to it (including a comprehensive discussion of the known risks, side effects and potential benefits of treatment), the patient (family) agrees to implement the following specific plan:  • Monitor for changes  • Benign, reassured    Seborrheic Keratosis  A seborrheic keratosis is a harmless warty spot that appears during adult life as a common sign of skin aging  Seborrheic keratoses can arise on any area of skin, covered or uncovered, with the usual exception of the palms and soles  They do not arise from mucous membranes  Seborrheic keratoses can have highly variable appearance  Seborrheic keratoses are extremely common  It has been estimated that over 90% of adults over the age of 61 years have one or more of them  They occur in males and females of all races, typically beginning to erupt in the 35s or 45s  They are uncommon under the age of 21 years    The precise cause of seborrhoeic keratoses is not known  Seborrhoeic keratoses are considered degenerative in nature  As time goes by, seborrheic keratoses tend to become more numerous  Some people inherit a tendency to develop a very large number of them; some people may have hundreds of them  There is no easy way to remove multiple lesions on a single occasion  Unless a specific lesion is "inflamed" and is causing pain or stinging/burning or is bleeding, most insurance companies do not authorize treatment  5  XEROSIS ("DRY SKIN")    Physical Exam:  • Anatomic Location Affected:  diffuse  • Morphological Description:  xerosis  • Pertinent Positives:  • Pertinent Negatives:    Assessment and Plan:  Based on a thorough discussion of this condition and the management approach to it (including a comprehensive discussion of the known risks, side effects and potential benefits of treatment), the patient (family) agrees to implement the following specific plan:  • Use moisturizer like Eucerin,Cerave or Aveeno Cream 3 times a day for the dry skin   •   •    •     Dry skin refers to skin that feels dry to touch  Dry skin has a dull surface with a rough, scaly quality  The skin is less pliable and cracked  When dryness is severe, the skin may become inflamed and fissured  Although any body site can be dry, dry skin tends to affect the shins more than any other site  Dry skin is lacking moisture in the outer horny cell layer (stratum corneum) and this results in cracks in the skin surface  Dry skin is also called xerosis, xeroderma or asteatosis (lack of fat)  It can affect males and females of all ages  There is some racial variability in water and lipid content of the skin  • Dry skin that starts in early childhood may be one of about 20 types of ichthyosis (fish-scale skin)  There is often a family history of dry skin  • Dry skin is commonly seen in people with atopic dermatitis    • Nearly everyone > 60 years has dry skin     Dry skin that begins later may be seen in people with certain diseases and conditions  • Postmenopausal women  • Hypothyroidism  • Chronic renal disease   • Malnutrition and weight loss   • Subclinical dermatitis   • Treatment with certain drugs such as oral retinoids, diuretics and epidermal growth factor receptor inhibitors      What is the treatment for dry skin? The mainstay of treatment of dry skin and ichthyosis is moisturisers/emollients  They should be applied liberally and often enough to:  • Reduce itch   • Improve the barrier function   • Prevent entry of irritants, bacteria   • Reduce transepidermal water loss  How can dry skin be prevented? Eliminate aggravating factors:  • Reduce the frequency of bathing  • A humidifier in winter and air conditioner in summer   • Compare having a short shower with a prolonged soak in a bath  • Use lukewarm, not hot, water  • Replace standard soap with a substitute such as a synthetic detergent cleanser, water-miscible emollient, bath oil, anti-pruritic tar oil, colloidal oatmeal etc    • Apply an emollient liberally and often, particularly shortly after bathing, and when itchy  The drier the skin, the thicker this should be, especially on the hands  What is the outlook for dry skin? A tendency to dry skin may persist life-long, or it may improve once contributing factors are controlled      6  ACTINIC KERATOSIS    Physical Exam:  • Anatomic Location Affected:  Scalp, forehead, left ear, and right eyebrow   • Morphological Description:  Scaly pink papules      Assessment and Plan:  Based on a thorough discussion of this condition and the management approach to it (including a comprehensive discussion of the known risks, side effects and potential benefits of treatment), the patient (family) agrees to implement the following specific plan:    • Liquid nitrogen was applied for 10-12 seconds to the skin lesion and the expected blistering or scabbing reaction explained  Do not pick at the area  Patient reminded to expect hypopigmented scars from the procedure  Return if lesion fails to fully resolve  • Patient will check to see if his sons office does Photodynamic therapy (blue light treatment)  • Please message Dr Pacheco Rodgers via [facility] if you desire topical chemotherapy treatment  o Fluorouracil 2x/day x 2 weeks to scalp and forehead vs compounded fluorouracil/calcipotriene 2x/day x 5 days ($45)  •     Actinic keratoses are very common on sites repeatedly exposed to the sun, especially the backs of the hands and the face, most often affecting the ears, nose, cheeks, upper lip, vermilion of the lower lip, temples, forehead and balding scalp  In severely chronically sun-damaged individuals, they may also be found on the upper trunk, upper and lower limbs, and dorsum of feet  We discussed the theoretical premalignant (“pre-cancerous”) nature and etiology of these growths  We discussed the prevailing notion that actinic keratoses are a reflection of abnormal skin cell development due to DNA damage by short wavelength UVB  They are more likely to appear if the immune function is poor, due to aging, recent sun exposure, predisposing disease or certain drugs  We discussed that the main concern is that actinic keratoses may predispose to squamous cell carcinoma  It is rare for a solitary actinic keratosis to evolve to squamous cell carcinoma (SCC), but the risk of SCC occurring at some stage in a patient with more than 10 actinic keratoses is thought to be about 10 to 15%  A tender, thickened, ulcerated or enlarging actinic keratosis is suspicious of SCC  Actinic keratoses may be prevented by strict sun protection  If already present, keratoses may improve with a very high sun protection factor (50+) broad-spectrum sunscreen applied at least daily to affected areas, year-round    We recommend that UPF-rated clothing and hats and sunglasses be worn whenever possible and that a sunscreen-moisturizer combination product such as Neutrogena Daily Defense be applied at least three times a day  We performed a thorough discussion of treatment options and specific risk/benefits/alternatives including but not limited to medical “field” treatment with medications such as the following:    • Topical “field area” medications such as 5-fluorouracil or Aldara (specifically, the trouble with long-term compliance, blistering and local skin reaction versus the convenience of at-home therapy and that field therapy “gets what is not yet seen”)  • Cryotherapy (specifically, local pain, scarring, dyspigmentation, blistering, possible superinfection, and treats “only what we see” versus directed treatment today)  • Photodynamic therapy (specifically, local pain, scarring, dyspigmentation, blistering, possible superinfection, need to schedule for a later date, and time spent in the office versus field therapy that “gets what is not yet seen”)  PROCEDURE:  DESTRUCTION OF PRE-MALIGNANT LESIONS  After a thorough discussion of treatment options and risk/benefits/alternatives (including but not limited to local pain, scarring, dyspigmentation, blistering, and possible superinfection), verbal and written consent were obtained and the aforementioned lesions were treated on with cryotherapy using liquid nitrogen x 1 cycle for 5-10 seconds  • TOTAL NUMBER of 6 pre-malignant lesions were treated today on the ANATOMIC LOCATION: right eyebrow, left ear, left forehead, left nose, left shoulder, and right foot  The patient tolerated the procedure well, and after-care instructions were provided      7  ACTINIC DAMAGE (Chronic Ultraviolet Radiation Exposure)    Physical Exam:  • Anatomic Location Affected:  scalp  • Morphological Description:  Mottled (hyper- and hypo-pigmented), slightly atrophic skin with overlying telangiectasia  • Pertinent Positives:  • Pertinent Negatives:    Assessment and Plan:  Based on a thorough discussion of this condition and the management approach to it (including a comprehensive discussion of the known risks, side effects and potential benefits of treatment), the patient (family) agrees to implement the following specific plan:  • When outside we recommend using a wide brim hat, sunglasses, long sleeve and pants, sunscreen with SPF 67+ with reapplication every 2 hours, or SPF specific clothing        Photo-aging and actinic damage of skin is common on sites repeatedly exposed to the sun, especially the backs of the hands and the face, most often affecting the ears, nose, cheeks, upper lip, vermilion of the lower lip, temples, forehead and balding scalp  In severely chronically sun-exposed individuals, this condition may also be found on the upper trunk, upper and lower limbs, and dorsum of feet  Photo-aging induces cutaneous changes that vary among individuals, reflecting inherent differences in vulnerability to sun exposure and repair capacity  We discussed further steps to minimize or avoid UV exposure:    • Be aware of daily UV index levels  In the [facility], this index is often reported on the [address]  • Avoid outdoor activities during the middle of the day  • Wear sun-protective clothing (e g , UPF-rated, broad-brimmed hats, long sleeves, and trousers or skirts)  • Apply a high sun protection factor (60+) broad-spectrum sunscreen moisturizer at least three times a day to affected areas, year-round  I recommended Neutrogena Daily Defense or CeraVe AM or Aveeno  • Do not smoke, and where possible, avoid exposure to pollutants  • Get plenty of exercise -- active people appear younger than inactive people  • Eat fruit and vegetables daily  • Many oral supplements with antioxidant and anti-inflammatory properties have been advocated to mitigate skin aging and to improve skin health   These include carotenoids; polyphenols; chlorophyll; aloe vera; vitamins B, C, and E; red ginseng; squalene; and omega-3 fatty acids  Their role in combatting skin aging is unclear  8  LIPOMA    Physical Exam:  • Anatomic Location Affected:  Bilateral arms   • Morphological Description:  subcutaneous nodules   • Pertinent Positives:  • Pertinent Negatives:    Assessment and Plan:  Based on a thorough discussion of this condition and the management approach to it (including a comprehensive discussion of the known risks, side effects and potential benefits of treatment), the patient (family) agrees to implement the following specific plan:  • Reassure benign    Lipoma  A lipoma is a non-cancerous tumor that is made up of fat cells  It slowly grows under the skin in the subcutaneous tissue  A person may have a single lipoma or may have many lipomas  They are very common  Lipomas can occur in people of all ages, however, they tend to develop in adulthood and are most noticeable during middle age  They affect both sexes equally, although solitary lipomas are more common in women whilst multiple lipomas occur more frequently in men  The cause of lipomas is unknown  It is possible there may be genetic involvement as many patients with lipomas come from a family with a history of these tumors  Sometimes an injury such as a blunt blow to part of the body may trigger growth of a lipoma  People are often unaware of lipomas until they have grown large enough to become visible and palpable  This growth occurs slowly over several years  Some features of lipomas include:  • A dome-shaped or egg-shaped lump about 2-10 cm in diameter (some may grow even larger)   • It feels soft and smooth and is easily moved under the skin with the fingers   • Some have a rubbery or doughy consistency   • They are most common on the shoulders, neck, trunk and arms, but they can occur anywhere on the body where fat tissue is present      Most lipomas are symptomless, but some are painful on applying pressure  Lipomas that are tender or painful are usually angiolipomas  This means the lipoma has an increased number of small blood vessels  Painful lipomas are also a feature of adiposis dolorosa or Dercum disease  Diagnosis of lipoma is usually made clinically by finding a soft lump under the skin  However, if there is any doubt, a deep skin biopsy can be performed which will show typical histopathological features of lipoma and its variants  Most lipomas require no treatment  Most lipomas eventually stop growing and remain indefinitely without causing any problems  Occasionally, lipomas that interfere with the movement of adjacent muscles may require surgical removal  Several methods are available:  • Simple surgical excision   • Squeeze technique (a small incision is made over the lipoma and the fatty tissue is squeezed through the hole)   • Liposuction    9  HISTORY OF SQUAMOUS CELL CARCINOMA     Physical Exam:  • Anatomic Location Affected:  Back of scalp  • Morphological Description of Scar:  Well healed   • Suspected Recurrence: no  • Regional adenopathy: no    Assessment and Plan:  Based on a thorough discussion of this condition and the management approach to it (including a comprehensive discussion of the known risks, side effects and potential benefits of treatment), the patient (family) agrees to implement the following specific plan:  • When outside we recommend using a wide brim hat, sunglasses, long sleeve and pants, sunscreen with SPF 60+ with reapplication every 2 hours, or SPF specific clothing       How can SCC be prevented? The most important way to prevent SCC is to avoid sunburn  This is especially important in childhood and early life  Fair skinned individuals and those with a personal or family history of BCC should protect their skin from sun exposure daily, year-round and lifelong    • Stay indoors or under the shade in the middle of the day   • Wear covering clothing   • Apply high protection factor SPF50+ broad-spectrum sunscreens generously to exposed skin if outdoors   • Avoid indoor tanning (sun beds, solaria)      What is the outlook for SCC? Most SCC are cured by treatment  Cure is most likely if treatment is undertaken when the lesion is small  A small percent of SCC's can spread to lymph  nodes and long term monitoring is indicated  They are also at increased risk of other skin cancers, especially melanoma  Regular self-skin examinations and long-term annual skin checks by an experienced health professional are recommended        Scribe Attestation    I,:  Kylah Tejeda MA am acting as a scribe while in the presence of the attending physician :       I,:  Priti De Oliveira MD personally performed the services described in this documentation    as scribed in my presence :

## 2022-11-03 NOTE — PATIENT INSTRUCTIONS
1  MELANOCYTIC NEVI ("Moles")  Assessment and Plan:  Based on a thorough discussion of this condition and the management approach to it (including a comprehensive discussion of the known risks, side effects and potential benefits of treatment), the patient (family) agrees to implement the following specific plan:  When outside we recommend using a wide brim hat, sunglasses, long sleeve and pants, sunscreen with SPF 03+ with reapplication every 2 hours, or SPF specific clothing   Benign, reassured  Annual skin check     Melanocytic Nevi  Melanocytic nevi ("moles") are tan or brown, raised or flat areas of the skin which have an increased number of melanocytes  Melanocytes are the cells in our body which make pigment and account for skin color  Some moles are present at birth (I e , "congenital nevi"), while others come up later in life (i e , "acquired nevi")  The sun can stimulate the body to make more moles  Sunburns are not the only thing that triggers more moles  Chronic sun exposure can do it too  Clinically distinguishing a healthy mole from melanoma may be difficult, even for experienced dermatologists  The "ABCDE's" of moles have been suggested as a means of helping to alert a person to a suspicious mole and the possible increased risk of melanoma  The suggestions for raising alert are as follows:    Asymmetry: Healthy moles tend to be symmetric, while melanomas are often asymmetric  Asymmetry means if you draw a line through the mole, the two halves do not match in color, size, shape, or surface texture  Asymmetry can be a result of rapid enlargement of a mole, the development of a raised area on a previously flat lesion, scaling, ulceration, bleeding or scabbing within the mole  Any mole that starts to demonstrate "asymmetry" should be examined promptly by a board certified dermatologist      Border: Healthy moles tend to have discrete, even borders    The border of a melanoma often blends into the normal skin and does not sharply delineate the mole from normal skin  Any mole that starts to demonstrate "uneven borders" should be examined promptly by a board certified dermatologist      Color: Healthy moles tend to be one color throughout  Melanomas tend to be made up of different colors ranging from dark black, blue, white, or red  Any mole that demonstrates a color change should be examined promptly by a board certified dermatologist      Diameter: Healthy moles tend to be smaller than 0 6 cm in size; an exception are "congenital nevi" that can be larger  Melanomas tend to grow and can often be greater than 0 6 cm (1/4 of an inch, or the size of a pencil eraser)  This is only a guideline, and many normal moles may be larger than 0 6 cm without being unhealthy  Any mole that starts to change in size (small to bigger or bigger to smaller) should be examined promptly by a board certified dermatologist      Evolving: Healthy moles tend to "stay the same "  Melanomas may often show signs of change or evolution such as a change in size, shape, color, or elevation  Any mole that starts to itch, bleed, crust, burn, hurt, or ulcerate or demonstrate a change or evolution should be examined promptly by a board certified dermatologist         2  LENTIGO  Assessment and Plan:  Based on a thorough discussion of this condition and the management approach to it (including a comprehensive discussion of the known risks, side effects and potential benefits of treatment), the patient (family) agrees to implement the following specific plan:  When outside we recommend using a wide brim hat, sunglasses, long sleeve and pants, sunscreen with SPF 93+ with reapplication every 2 hours, or SPF specific clothing       What is a lentigo? A lentigo is a pigmented flat or slightly raised lesion with a clearly defined edge  Unlike an ephelis (freckle), it does not fade in the winter months   There are several kinds of lentigo  The name lentigo originally referred to its appearance resembling a small lentil  The plural of lentigo is lentigines, although “lentigos” is also in common use  Who gets lentigines? Lentigines can affect males and females of all ages and races  Solar lentigines are especially prevalent in fair skinned adults  Lentigines associated with syndromes are present at birth or arise during childhood  What causes lentigines? Common forms of lentigo are due to exposure to ultraviolet radiation:  Sun damage including sunburn   Indoor tanning   Phototherapy, especially photochemotherapy (PUVA)    Ionizing radiation, eg radiation therapy, can also cause lentigines  Several familial syndromes associated with widespread lentigines originate from mutations in Edwin-MAP kinase, mTOR signaling and PTEN pathways  What is the treatment for lentigines? Most lentigines are left alone  Attempts to lighten them may not be successful  The following approaches are used:  SPF 50+ broad-spectrum sunscreen   Hydroquinone bleaching cream   Alpha hydroxy acids   Vitamin C   Retinoids   Azelaic acid   Chemical peels  Individual lesions can be permanently removed using:  Cryotherapy   Intense pulsed light   Pigment lasers    How can lentigines be prevented? Lentigines associated with exposure ultraviolet radiation can be prevented by very careful sun protection  Clothing is more successful at preventing new lentigines than are sunscreens  What is the outlook for lentigines? Lentigines usually persist  They may increase in number with age and sun exposure  Some in sun-protected sites may fade and disappear      3  ULLOA ANGIOMAS  Assessment and Plan:  Based on a thorough discussion of this condition and the management approach to it (including a comprehensive discussion of the known risks, side effects and potential benefits of treatment), the patient (family) agrees to implement the following specific plan:  Monitor for changes  Benign, reassured    Assessment and Plan:    Cherry angioma, also known as [employer] spots, are benign vascular skin lesions  A "cherry angioma" is a firm red, blue or purple papule, 0 1-1 cm in diameter  When thrombosed, they can appear black in colour until evaluated with a dermatoscope when the red or purple colour is more easily seen  Cherry angioma may develop on any part of the body but most often appear on the scalp, face, lips and trunk  An angioma is due to proliferating endothelial cells; these are the cells that line the inside of a blood vessel  Angiomas can arise in early life or later in life; the most common type of angioma is a cherry angioma  Cherry angiomas are very common in males and females of any age or race  They are more noticeable in white skin than in skin of colour  They markedly increase in number from about the age of 36  There may be a family history of similar lesions  Eruptive cherry angiomas have been rarely reported to be associated with internal malignancy  The cause of angiomas is unknown  Genetic analysis of cherry angiomas has shown that they frequently carry specific somatic missense mutations in the GNAQ and GNA11 (Q209H) genes, which are involved in other vascular and melanocytic proliferations  4  SEBORRHEIC KERATOSIS; NON-INFLAMED  Assessment and Plan:  Based on a thorough discussion of this condition and the management approach to it (including a comprehensive discussion of the known risks, side effects and potential benefits of treatment), the patient (family) agrees to implement the following specific plan:  Monitor for changes  Benign, reassured    Seborrheic Keratosis  A seborrheic keratosis is a harmless warty spot that appears during adult life as a common sign of skin aging  Seborrheic keratoses can arise on any area of skin, covered or uncovered, with the usual exception of the palms and soles  They do not arise from mucous membranes  Seborrheic keratoses can have highly variable appearance  Seborrheic keratoses are extremely common  It has been estimated that over 90% of adults over the age of 61 years have one or more of them  They occur in males and females of all races, typically beginning to erupt in the 35s or 45s  They are uncommon under the age of 21 years  The precise cause of seborrhoeic keratoses is not known  Seborrhoeic keratoses are considered degenerative in nature  As time goes by, seborrheic keratoses tend to become more numerous  Some people inherit a tendency to develop a very large number of them; some people may have hundreds of them  There is no easy way to remove multiple lesions on a single occasion  Unless a specific lesion is "inflamed" and is causing pain or stinging/burning or is bleeding, most insurance companies do not authorize treatment  5  XEROSIS ("DRY SKIN")  Assessment and Plan:  Based on a thorough discussion of this condition and the management approach to it (including a comprehensive discussion of the known risks, side effects and potential benefits of treatment), the patient (family) agrees to implement the following specific plan:  Use moisturizer like Eucerin,Cerave or Aveeno Cream 3 times a day for the dry skin            Dry skin refers to skin that feels dry to touch  Dry skin has a dull surface with a rough, scaly quality  The skin is less pliable and cracked  When dryness is severe, the skin may become inflamed and fissured  Although any body site can be dry, dry skin tends to affect the shins more than any other site  Dry skin is lacking moisture in the outer horny cell layer (stratum corneum) and this results in cracks in the skin surface  Dry skin is also called xerosis, xeroderma or asteatosis (lack of fat)  It can affect males and females of all ages  There is some racial variability in water and lipid content of the skin    Dry skin that starts in early childhood may be one of about 20 types of ichthyosis (fish-scale skin)  There is often a family history of dry skin  Dry skin is commonly seen in people with atopic dermatitis  Nearly everyone > 60 years has dry skin  Dry skin that begins later may be seen in people with certain diseases and conditions  Postmenopausal women  Hypothyroidism  Chronic renal disease   Malnutrition and weight loss   Subclinical dermatitis   Treatment with certain drugs such as oral retinoids, diuretics and epidermal growth factor receptor inhibitors      What is the treatment for dry skin? The mainstay of treatment of dry skin and ichthyosis is moisturisers/emollients  They should be applied liberally and often enough to:  Reduce itch   Improve the barrier function   Prevent entry of irritants, bacteria   Reduce transepidermal water loss  How can dry skin be prevented? Eliminate aggravating factors:  Reduce the frequency of bathing  A humidifier in winter and air conditioner in summer   Compare having a short shower with a prolonged soak in a bath  Use lukewarm, not hot, water  Replace standard soap with a substitute such as a synthetic detergent cleanser, water-miscible emollient, bath oil, anti-pruritic tar oil, colloidal oatmeal etc    Apply an emollient liberally and often, particularly shortly after bathing, and when itchy  The drier the skin, the thicker this should be, especially on the hands  What is the outlook for dry skin? A tendency to dry skin may persist life-long, or it may improve once contributing factors are controlled      6  ACTINIC KERATOSIS  Assessment and Plan:  Based on a thorough discussion of this condition and the management approach to it (including a comprehensive discussion of the known risks, side effects and potential benefits of treatment), the patient (family) agrees to implement the following specific plan:    Liquid nitrogen was applied for 10-12 seconds to the skin lesion and the expected blistering or scabbing reaction explained  Do not pick at the area  Patient reminded to expect hypopigmented scars from the procedure  Return if lesion fails to fully resolve  Patient will check to see if his sons office does Photodynamic therapy (blue light treatment)  Please message Dr Devra Kocher via [facility] if you desire topical chemotherapy treatment  Fluorouracil 2x/day x 2 weeks to scalp and forehead vs compounded fluorouracil/calcipotriene 2x/day x 5 days ($45)      Actinic keratoses are very common on sites repeatedly exposed to the sun, especially the backs of the hands and the face, most often affecting the ears, nose, cheeks, upper lip, vermilion of the lower lip, temples, forehead and balding scalp  In severely chronically sun-damaged individuals, they may also be found on the upper trunk, upper and lower limbs, and dorsum of feet  We discussed the theoretical premalignant (“pre-cancerous”) nature and etiology of these growths  We discussed the prevailing notion that actinic keratoses are a reflection of abnormal skin cell development due to DNA damage by short wavelength UVB  They are more likely to appear if the immune function is poor, due to aging, recent sun exposure, predisposing disease or certain drugs  We discussed that the main concern is that actinic keratoses may predispose to squamous cell carcinoma  It is rare for a solitary actinic keratosis to evolve to squamous cell carcinoma (SCC), but the risk of SCC occurring at some stage in a patient with more than 10 actinic keratoses is thought to be about 10 to 15%  A tender, thickened, ulcerated or enlarging actinic keratosis is suspicious of SCC  Actinic keratoses may be prevented by strict sun protection  If already present, keratoses may improve with a very high sun protection factor (50+) broad-spectrum sunscreen applied at least daily to affected areas, year-round    We recommend that UPF-rated clothing and hats and sunglasses be worn whenever possible and that a sunscreen-moisturizer combination product such as Neutrogena Daily Defense be applied at least three times a day  We performed a thorough discussion of treatment options and specific risk/benefits/alternatives including but not limited to medical “field” treatment with medications such as the following:    Topical “field area” medications such as 5-fluorouracil or Aldara (specifically, the trouble with long-term compliance, blistering and local skin reaction versus the convenience of at-home therapy and that field therapy “gets what is not yet seen”)  Cryotherapy (specifically, local pain, scarring, dyspigmentation, blistering, possible superinfection, and treats “only what we see” versus directed treatment today)  Photodynamic therapy (specifically, local pain, scarring, dyspigmentation, blistering, possible superinfection, need to schedule for a later date, and time spent in the office versus field therapy that “gets what is not yet seen”)  PROCEDURE:  DESTRUCTION OF PRE-MALIGNANT LESIONS  After a thorough discussion of treatment options and risk/benefits/alternatives (including but not limited to local pain, scarring, dyspigmentation, blistering, and possible superinfection), verbal and written consent were obtained and the aforementioned lesions were treated on with cryotherapy using liquid nitrogen x 1 cycle for 5-10 seconds        7  ACTINIC DAMAGE (Chronic Ultraviolet Radiation Exposure)  Assessment and Plan:  Based on a thorough discussion of this condition and the management approach to it (including a comprehensive discussion of the known risks, side effects and potential benefits of treatment), the patient (family) agrees to implement the following specific plan:  When outside we recommend using a wide brim hat, sunglasses, long sleeve and pants, sunscreen with SPF 49+ with reapplication every 2 hours, or SPF specific clothing        Photo-aging and actinic damage of skin is common on sites repeatedly exposed to the sun, especially the backs of the hands and the face, most often affecting the ears, nose, cheeks, upper lip, vermilion of the lower lip, temples, forehead and balding scalp  In severely chronically sun-exposed individuals, this condition may also be found on the upper trunk, upper and lower limbs, and dorsum of feet  Photo-aging induces cutaneous changes that vary among individuals, reflecting inherent differences in vulnerability to sun exposure and repair capacity  We discussed further steps to minimize or avoid UV exposure:    Be aware of daily UV index levels  In the [facility], this index is often reported on the [address]  Avoid outdoor activities during the middle of the day  Wear sun-protective clothing (e g , UPF-rated, broad-brimmed hats, long sleeves, and trousers or skirts)  Apply a high sun protection factor (60+) broad-spectrum sunscreen moisturizer at least three times a day to affected areas, year-round  I recommended Neutrogena Daily Defense or CeraVe AM or Aveeno  Do not smoke, and where possible, avoid exposure to pollutants  Get plenty of exercise -- active people appear younger than inactive people  Eat fruit and vegetables daily  Many oral supplements with antioxidant and anti-inflammatory properties have been advocated to mitigate skin aging and to improve skin health  These include carotenoids; polyphenols; chlorophyll; aloe vera; vitamins B, C, and E; red ginseng; squalene; and omega-3 fatty acids  Their role in combatting skin aging is unclear    8  LIPOMA  Assessment and Plan:  Based on a thorough discussion of this condition and the management approach to it (including a comprehensive discussion of the known risks, side effects and potential benefits of treatment), the patient (family) agrees to implement the following specific plan:  Reassure benign    Lipoma  A lipoma is a non-cancerous tumor that is made up of fat cells  It slowly grows under the skin in the subcutaneous tissue  A person may have a single lipoma or may have many lipomas  They are very common  Lipomas can occur in people of all ages, however, they tend to develop in adulthood and are most noticeable during middle age  They affect both sexes equally, although solitary lipomas are more common in women whilst multiple lipomas occur more frequently in men  The cause of lipomas is unknown  It is possible there may be genetic involvement as many patients with lipomas come from a family with a history of these tumors  Sometimes an injury such as a blunt blow to part of the body may trigger growth of a lipoma  People are often unaware of lipomas until they have grown large enough to become visible and palpable  This growth occurs slowly over several years  Some features of lipomas include:  A dome-shaped or egg-shaped lump about 2-10 cm in diameter (some may grow even larger)   It feels soft and smooth and is easily moved under the skin with the fingers   Some have a rubbery or doughy consistency   They are most common on the shoulders, neck, trunk and arms, but they can occur anywhere on the body where fat tissue is present  Most lipomas are symptomless, but some are painful on applying pressure  Lipomas that are tender or painful are usually angiolipomas  This means the lipoma has an increased number of small blood vessels  Painful lipomas are also a feature of adiposis dolorosa or Dercum disease  Diagnosis of lipoma is usually made clinically by finding a soft lump under the skin  However, if there is any doubt, a deep skin biopsy can be performed which will show typical histopathological features of lipoma and its variants  Most lipomas require no treatment  Most lipomas eventually stop growing and remain indefinitely without causing any problems   Occasionally, lipomas that interfere with the movement of adjacent muscles may require surgical removal  Several methods are available:  Simple surgical excision   Squeeze technique (a small incision is made over the lipoma and the fatty tissue is squeezed through the hole)   Liposuction    9  HISTORY OF SQUAMOUS CELL CARCINOMA   Assessment and Plan:  Based on a thorough discussion of this condition and the management approach to it (including a comprehensive discussion of the known risks, side effects and potential benefits of treatment), the patient (family) agrees to implement the following specific plan:  When outside we recommend using a wide brim hat, sunglasses, long sleeve and pants, sunscreen with SPF 62+ with reapplication every 2 hours, or SPF specific clothing       How can SCC be prevented? The most important way to prevent SCC is to avoid sunburn  This is especially important in childhood and early life  Fair skinned individuals and those with a personal or family history of BCC should protect their skin from sun exposure daily, year-round and lifelong  Stay indoors or under the shade in the middle of the day   Wear covering clothing   Apply high protection factor SPF50+ broad-spectrum sunscreens generously to exposed skin if outdoors   Avoid indoor tanning (sun beds, solaria)      What is the outlook for SCC? Most SCC are cured by treatment  Cure is most likely if treatment is undertaken when the lesion is small  A small percent of SCC's can spread to lymph  nodes and long term monitoring is indicated  They are also at increased risk of other skin cancers, especially melanoma  Regular self-skin examinations and long-term annual skin checks by an experienced health professional are recommended

## 2022-11-09 DIAGNOSIS — E78.2 MIXED HYPERLIPIDEMIA: ICD-10-CM

## 2022-11-09 RX ORDER — SIMVASTATIN 20 MG
TABLET ORAL
Qty: 90 TABLET | Refills: 3 | Status: SHIPPED | OUTPATIENT
Start: 2022-11-09

## 2022-11-09 NOTE — TELEPHONE ENCOUNTER
Simvastatin 20mg oral tablet    Dispense:90 Refills:3    Cardiovascular:  Antilipid - Statins Failed 11/09/2022 03:35 PM   Protocol Details  No hospital admission in the last 30 days

## 2022-11-16 ENCOUNTER — OFFICE VISIT (OUTPATIENT)
Dept: CARDIOLOGY CLINIC | Facility: CLINIC | Age: 72
End: 2022-11-16

## 2022-11-16 VITALS
WEIGHT: 181 LBS | DIASTOLIC BLOOD PRESSURE: 84 MMHG | HEART RATE: 80 BPM | HEIGHT: 75 IN | OXYGEN SATURATION: 97 % | RESPIRATION RATE: 16 BRPM | BODY MASS INDEX: 22.5 KG/M2 | SYSTOLIC BLOOD PRESSURE: 122 MMHG

## 2022-11-16 DIAGNOSIS — I42.9 CARDIOMYOPATHY, UNSPECIFIED TYPE (HCC): ICD-10-CM

## 2022-11-16 DIAGNOSIS — I10 ESSENTIAL HYPERTENSION: ICD-10-CM

## 2022-11-16 DIAGNOSIS — Z79.899 ENCOUNTER FOR MONITORING SOTALOL THERAPY: ICD-10-CM

## 2022-11-16 DIAGNOSIS — Z51.81 ENCOUNTER FOR MONITORING SOTALOL THERAPY: ICD-10-CM

## 2022-11-16 DIAGNOSIS — Z79.01 CHRONIC ANTICOAGULATION: ICD-10-CM

## 2022-11-16 DIAGNOSIS — I48.0 PAF (PAROXYSMAL ATRIAL FIBRILLATION) (HCC): Primary | ICD-10-CM

## 2022-11-16 NOTE — PROGRESS NOTES
[facility] [address] [address]  Cardiology Follow Up    Pepe Lama  1950  [id number]      1  PAF (paroxysmal atrial fibrillation) ([facility] [address] )        2  Encounter for monitoring sotalol therapy        3  Essential hypertension        4  Chronic anticoagulation            Chief Complaint   Patient presents with   • Follow-up       Interval History:  Patient presents for follow-up visit  Patient does have history of paroxysmal atrial fibrillation and mild nonischemic cardiomyopathy in the past   Patient is on Betapace prophylaxis as well as apixaban for anticoagulation  Patient had 1 episode of atrial fibrillation when he saw his primary care physician not too long ago  Patient went back to sinus rhythm on his own  Patient is on sotalol 80 mg 1 and half tablet twice a day  He denies any history of bleeding issues  He states that he has been compliant all his present medications      Patient Active Problem List   Diagnosis   • Cardiomyopathy [facility])   • Mixed hyperlipidemia   • Essential hypertension   • Atrial flutter (HCC)   • Neurogenic bladder   • Current use of long term anticoagulation   • History of total hip arthroplasty   • Cough   • Thoracic aortic aneurysm without rupture   • History of skin cancer   • Lung nodule < 6cm on CT   • History of tobacco use   • Family history of colon cancer in mother     Past Medical History:   Diagnosis Date   • Atrial fibrillation [facility])    • Benign hypertension    • Blood coagulation disorder ([facility] )    • Chronic bilateral low back pain without sciatica 04/24/2018   • Colon polyp    • Hip arthritis 01/22/2019   • Hypertriglyceridemia    • Lipid metabolism disorder    • Neurogenic bladder    • Other postprocedural cardiac functional disturbances following cardiac surgery    • Preop examination 01/22/2019   • Squamous cell skin cancer     scalp     Social History     Socioeconomic History   • Marital status: /Civil [employer]     Spouse name: Not on file   • Number of children: Not on file   • Years of education: Not on file   • Highest education level: Not on file   Occupational History     Comment: employed   Tobacco Use   • Smoking status: Former   • Smokeless tobacco: Never   • Tobacco comments:     quite in 1972   Vaping Use   • Vaping Use: Never used   Substance and Sexual Activity   • Alcohol use: Yes     Alcohol/week: 1 0 standard drink     Types: 1 Glasses of wine per week     Comment: each night   • Drug use: No   • Sexual activity: Not on file   Other Topics Concern   • Not on file   Social History Narrative    Always uses a seatbelt    Daily caffeine consumption, 2-3 servings per day    Seeing a dentist     Social Determinants of Health     Financial Resource Strain: Low Risk    • Difficulty of Paying Living Expenses: Not hard at all   Food Insecurity: Not on file   Transportation Needs: No Transportation Needs   • Lack of Transportation (Medical): No   • Lack of Transportation (Non-Medical):  No   Physical Activity: Not on file   Stress: Not on file   Social Connections: Not on file   Intimate Partner Violence: Not on file   Housing Stability: Not on file      Family History   Problem Relation Age of Onset   • Diabetes Mother    • Colon cancer Mother    • Lung cancer Father      Past Surgical History:   Procedure Laterality Date   • COLONOSCOPY     • HERNIA REPAIR     • HIP SURGERY Right 02/2019   • KNEE SURGERY Bilateral    • LIPOMA RESECTION         Current Outpatient Medications:   •  acetaminophen (TYLENOL) 650 mg CR tablet, Take 1,300 mg by mouth every 8 (eight) hours as needed, Disp: , Rfl:   •  apixaban (ELIQUIS) 5 mg, Take 1 tablet (5 mg total) by mouth 2 (two) times a day, Disp: 180 tablet, Rfl: 3  •  Cream Base (PCCA LIPODERM BASE) CREA, PCCA Lipoderm Base cream, Disp: , Rfl:   •  fluticasone (FLONASE) 50 mcg/act nasal spray, fluticasone propionate 50 mcg/actuation nasal spray,suspension  instill 2 sprays into each nostril once daily, Disp: , Rfl:   •  simvastatin (ZOCOR) 20 mg tablet, TAKE 1 TABLET BY MOUTH  DAILY AT BEDTIME, Disp: 90 tablet, Rfl: 3  •  sotalol (BETAPACE) 80 mg tablet, 1 1/2 tab twice a day, Disp: 270 tablet, Rfl: 3  •  tamsulosin (FLOMAX) 0 4 mg, Take 0 8 mg by mouth daily with dinner, Disp: , Rfl:   Allergies   Allergen Reactions   • Ramipril Cough       Labs:  Orders Only on 10/31/2022   Component Date Value   • White Blood Cell Count 10/31/2022 3 8    • Red Blood Cell Count 10/31/2022 4 07 (L)    • Hemoglobin 10/31/2022 14 0    • HCT 10/31/2022 41 1    • MCV 10/31/2022 101 (H)    • MCH 10/31/2022 34 4 (H)    • MCHC 10/31/2022 34 1    • RDW 10/31/2022 12 0    • Platelet Count 33/43/4953 121 (L)    • Neutrophils 10/31/2022 57    • Lymphocytes 10/31/2022 32    • Monocytes 10/31/2022 8    • Eosinophils 10/31/2022 2    • Basophils PCT 10/31/2022 1    • Neutrophils (Absolute) 10/31/2022 2 2    • Lymphocytes (Absolute) 10/31/2022 1 2    • Monocytes (Absolute) 10/31/2022 0 3    • Eosinophils (Absolute) 10/31/2022 0 1    • Basophils ABS 10/31/2022 0 0    • Immature Granulocytes 10/31/2022 0    • Immature Granulocytes (A* 10/31/2022 0 0    • Glucose, Random 10/31/2022 90    • BUN 10/31/2022 19    • Creatinine 10/31/2022 0 99    • eGFR 10/31/2022 82    • SL AMB BUN/CREATININE RA* 10/31/2022 19    • Sodium 10/31/2022 138    • Potassium 10/31/2022 4 6    • Chloride 10/31/2022 104    • CO2 10/31/2022 24    • CALCIUM 10/31/2022 9 3    • Protein, Total 10/31/2022 6 4    • Albumin 10/31/2022 4 5    • Globulin, Total 10/31/2022 1 9    • Albumin/Globulin Ratio 10/31/2022 2 4 (H)    • TOTAL BILIRUBIN 10/31/2022 0 6    • Alk Phos Isoenzymes 10/31/2022 80    • AST 10/31/2022 25    • ALT 10/31/2022 26    • Cholesterol, Total 10/31/2022 126    • Triglycerides 10/31/2022 84    • HDL 10/31/2022 50    • VLDL Cholesterol Calcula* 10/31/2022 16    • LDL Calculated 10/31/2022 60    • Magnesium, Serum 10/31/2022 2 0 Imaging: No results found  Review of Systems:  Review of Systems   REVIEW OF SYSTEMS:  Constitutional:  Denies fever or chills   Eyes:  Denies change in visual acuity   HENT:  Denies nasal congestion or sore throat   Respiratory:  Denies cough or shortness of breath   Cardiovascular:  Denies chest pain or edema   GI:  Denies abdominal pain, nausea, vomiting, bloody stools or diarrhea   :  Denies dysuria, frequency, difficulty in micturition and nocturia  Musculoskeletal:  Denies back pain or joint pain   Neurologic:  Denies headache, focal weakness or sensory changes   Endocrine:  Denies polyuria or polydipsia   Lymphatic:  Denies swollen glands   Psychiatric:  Denies depression or anxiety    Physical Exam:    /84 (BP Location: Left arm, Patient Position: Sitting, Cuff Size: Standard)   Pulse 80   Resp 16   Ht 6' 3" (1 905 m)   Wt 82 1 kg (181 lb)   SpO2 97%   BMI 22 62 kg/m²     Physical Exam   PHYSICAL EXAM:  General:  Patient is not in acute distress   Head: Normocephalic, Atraumatic  HEENT:  Both pupils normal-size atraumatic, normocephalic, nonicteric  Neck:  JVP not raised  Trachea central  No carotid bruit  Respiratory:  normal breath sounds no crackles  no rhonchi  Cardiovascular:  Regular rate and rhythm no S3 no murmurs  GI:  Abdomen soft nontender  No organomegaly  Lymphatic:  No cervical or inguinal lymphadenopathy  Neurologic:  Patient is awake alert, oriented   Grossly nonfocal  Extremities no edema    Discussion/Summary:  Patient with multiple medical problems who seems to be doing reasonably well from cardiac standpoint  Previous studies reviewed with patient  Medications reviewed and possible side effects discussed  concepts of cardiovascular disease , signs and symptoms of heart disease  Dietary and risk factor modification reinforced  All questions answered  Safety measures reviewed  Patient advised to report any problems prompting medical attention      Risks and benefits and alternatives of anticoagulation to prevent thromboembolic risk from atrial fibrillation discussed at length  Patient to report any bleeding issues  Symptoms to watch out from cardiac standpoint which would indicate the need for further cardiac evaluation also discussed  Echocardiogram will be done to evaluate ejection fraction given history of mild nonischemic cardiomyopathy in the past as well as history of paroxysmal atrial fibrillation  Follow-up in 6 months or earlier as needed  Follow-up with primary care physician  Patient is agreeable with the plan of care

## 2022-12-13 ENCOUNTER — HOSPITAL ENCOUNTER (OUTPATIENT)
Dept: NON INVASIVE DIAGNOSTICS | Facility: CLINIC | Age: 72
Discharge: HOME | End: 2022-12-13

## 2022-12-13 VITALS
SYSTOLIC BLOOD PRESSURE: 122 MMHG | DIASTOLIC BLOOD PRESSURE: 84 MMHG | HEART RATE: 80 BPM | WEIGHT: 181 LBS | BODY MASS INDEX: 22.5 KG/M2 | HEIGHT: 75 IN

## 2022-12-13 DIAGNOSIS — I42.9 CARDIOMYOPATHY, UNSPECIFIED TYPE (HCC): ICD-10-CM

## 2022-12-13 DIAGNOSIS — I48.0 PAF (PAROXYSMAL ATRIAL FIBRILLATION) (HCC): ICD-10-CM

## 2022-12-13 LAB
AORTIC ROOT: 3.8 CM
E WAVE DECELERATION TIME: 190 MS
FRACTIONAL SHORTENING: 33 (ref 28–44)
INTERVENTRICULAR SEPTUM IN DIASTOLE (PARASTERNAL SHORT AXIS VIEW): 0.9 CM
INTERVENTRICULAR SEPTUM: 0.9 CM (ref 0.6–1.1)
LEFT ATRIUM SIZE: 3.5 CM
LEFT INTERNAL DIMENSION IN SYSTOLE: 3.5 CM (ref 2.1–4)
LEFT VENTRICULAR INTERNAL DIMENSION IN DIASTOLE: 5.2 CM (ref 3.5–6)
LEFT VENTRICULAR POSTERIOR WALL IN END DIASTOLE: 1 CM
LEFT VENTRICULAR STROKE VOLUME: 76 ML
LVSV (TEICH): 76 ML
MV E'TISSUE VEL-SEP: 7 CM/S
MV PEAK A VEL: 0.48 M/S
MV PEAK E VEL: 47 CM/S
MV STENOSIS PRESSURE HALF TIME: 55 MS
MV VALVE AREA P 1/2 METHOD: 4
SL CV LV EF: 50
SL CV PED ECHO LEFT VENTRICLE DIASTOLIC VOLUME (MOD BIPLANE) 2D: 128 ML
SL CV PED ECHO LEFT VENTRICLE SYSTOLIC VOLUME (MOD BIPLANE) 2D: 53 ML

## 2023-05-03 ENCOUNTER — COSMETIC (OUTPATIENT)
Dept: DERMATOLOGY | Facility: CLINIC | Age: 73
End: 2023-05-03

## 2023-05-03 ENCOUNTER — OFFICE VISIT (OUTPATIENT)
Dept: DERMATOLOGY | Facility: CLINIC | Age: 73
End: 2023-05-03

## 2023-05-03 VITALS — BODY MASS INDEX: 21.76 KG/M2 | TEMPERATURE: 97.8 F | HEIGHT: 75 IN | WEIGHT: 175 LBS

## 2023-05-03 DIAGNOSIS — D22.70 MULTIPLE BENIGN MELANOCYTIC NEVI OF UPPER AND LOWER EXTREMITIES AND TRUNK: Primary | ICD-10-CM

## 2023-05-03 DIAGNOSIS — D22.5 MULTIPLE BENIGN MELANOCYTIC NEVI OF UPPER AND LOWER EXTREMITIES AND TRUNK: Primary | ICD-10-CM

## 2023-05-03 DIAGNOSIS — L85.3 XEROSIS OF SKIN: ICD-10-CM

## 2023-05-03 DIAGNOSIS — D18.01 CHERRY ANGIOMA: ICD-10-CM

## 2023-05-03 DIAGNOSIS — L82.1 SEBORRHEIC KERATOSIS: Primary | ICD-10-CM

## 2023-05-03 DIAGNOSIS — L81.4 LENTIGO: ICD-10-CM

## 2023-05-03 DIAGNOSIS — L82.1 SEBORRHEIC KERATOSIS: ICD-10-CM

## 2023-05-03 DIAGNOSIS — L57.0 AK (ACTINIC KERATOSIS): ICD-10-CM

## 2023-05-03 DIAGNOSIS — D22.60 MULTIPLE BENIGN MELANOCYTIC NEVI OF UPPER AND LOWER EXTREMITIES AND TRUNK: Primary | ICD-10-CM

## 2023-05-03 NOTE — PATIENT INSTRUCTIONS
"MELANOCYTIC NEVI (\"Moles\")    Assessment and Plan:  Based on a thorough discussion of this condition and the management approach to it (including a comprehensive discussion of the known risks, side effects and potential benefits of treatment), the patient (family) agrees to implement the following specific plan:  When outside we recommend using a wide brim hat, sunglasses, long sleeve and pants, sunscreen with SPF 66+ with reapplication every 2 hours, or SPF specific clothing   Benign, reassured  Annual skin check     Melanocytic Nevi  Melanocytic nevi (\"moles\") are tan or brown, raised or flat areas of the skin which have an increased number of melanocytes  Melanocytes are the cells in our body which make pigment and account for skin color  Some moles are present at birth (I e , \"congenital nevi\"), while others come up later in life (i e , \"acquired nevi\")  The sun can stimulate the body to make more moles  Sunburns are not the only thing that triggers more moles  Chronic sun exposure can do it too  Clinically distinguishing a healthy mole from melanoma may be difficult, even for experienced dermatologists  The \"ABCDE's\" of moles have been suggested as a means of helping to alert a person to a suspicious mole and the possible increased risk of melanoma  The suggestions for raising alert are as follows:    Asymmetry: Healthy moles tend to be symmetric, while melanomas are often asymmetric  Asymmetry means if you draw a line through the mole, the two halves do not match in color, size, shape, or surface texture  Asymmetry can be a result of rapid enlargement of a mole, the development of a raised area on a previously flat lesion, scaling, ulceration, bleeding or scabbing within the mole  Any mole that starts to demonstrate \"asymmetry\" should be examined promptly by a board certified dermatologist      Border: Healthy moles tend to have discrete, even borders    The border of a melanoma often blends into " "the normal skin and does not sharply delineate the mole from normal skin  Any mole that starts to demonstrate \"uneven borders\" should be examined promptly by a board certified dermatologist      Color: Healthy moles tend to be one color throughout  Melanomas tend to be made up of different colors ranging from dark black, blue, white, or red  Any mole that demonstrates a color change should be examined promptly by a board certified dermatologist      Diameter: Healthy moles tend to be smaller than 0 6 cm in size; an exception are \"congenital nevi\" that can be larger  Melanomas tend to grow and can often be greater than 0 6 cm (1/4 of an inch, or the size of a pencil eraser)  This is only a guideline, and many normal moles may be larger than 0 6 cm without being unhealthy  Any mole that starts to change in size (small to bigger or bigger to smaller) should be examined promptly by a board certified dermatologist      Evolving: Healthy moles tend to \"stay the same  \"  Melanomas may often show signs of change or evolution such as a change in size, shape, color, or elevation  Any mole that starts to itch, bleed, crust, burn, hurt, or ulcerate or demonstrate a change or evolution should be examined promptly by a board certified dermatologist         Flako Iglesias and Plan:  Based on a thorough discussion of this condition and the management approach to it (including a comprehensive discussion of the known risks, side effects and potential benefits of treatment), the patient (family) agrees to implement the following specific plan:  When outside we recommend using a wide brim hat, sunglasses, long sleeve and pants, sunscreen with SPF 88+ with reapplication every 2 hours, or SPF specific clothing       What is a lentigo? A lentigo is a pigmented flat or slightly raised lesion with a clearly defined edge  Unlike an ephelis (freckle), it does not fade in the winter months  There are several kinds of lentigo    The " name lentigo originally referred to its appearance resembling a small lentil  The plural of lentigo is lentigines, although lentigos is also in common use  Who gets lentigines? Lentigines can affect males and females of all ages and races  Solar lentigines are especially prevalent in fair skinned adults  Lentigines associated with syndromes are present at birth or arise during childhood  What causes lentigines? Common forms of lentigo are due to exposure to ultraviolet radiation:  Sun damage including sunburn   Indoor tanning   Phototherapy, especially photochemotherapy (PUVA)    Ionizing radiation, eg radiation therapy, can also cause lentigines  Several familial syndromes associated with widespread lentigines originate from mutations in Edwin-MAP kinase, mTOR signaling and PTEN pathways  What is the treatment for lentigines? Most lentigines are left alone  Attempts to lighten them may not be successful  The following approaches are used:  SPF 50+ broad-spectrum sunscreen   Hydroquinone bleaching cream   Alpha hydroxy acids   Vitamin C   Retinoids   Azelaic acid   Chemical peels  Individual lesions can be permanently removed using:  Cryotherapy   Intense pulsed light   Pigment lasers    How can lentigines be prevented? Lentigines associated with exposure ultraviolet radiation can be prevented by very careful sun protection  Clothing is more successful at preventing new lentigines than are sunscreens  What is the outlook for lentigines? Lentigines usually persist  They may increase in number with age and sun exposure  Some in sun-protected sites may fade and disappear      ULLOA ANGIOMAS    Assessment and Plan:  Based on a thorough discussion of this condition and the management approach to it (including a comprehensive discussion of the known risks, side effects and potential benefits of treatment), the patient (family) agrees to implement the following specific plan:  Monitor for changes  Benign, "reassured      Assessment and Plan:    Cherry angioma, also known as [employer] spots, are benign vascular skin lesions  A \"cherry angioma\" is a firm red, blue or purple papule, 0 1-1 cm in diameter  When thrombosed, they can appear black in colour until evaluated with a dermatoscope when the red or purple colour is more easily seen  Cherry angioma may develop on any part of the body but most often appear on the scalp, face, lips and trunk  An angioma is due to proliferating endothelial cells; these are the cells that line the inside of a blood vessel  Angiomas can arise in early life or later in life; the most common type of angioma is a cherry angioma  Cherry angiomas are very common in males and females of any age or race  They are more noticeable in white skin than in skin of colour  They markedly increase in number from about the age of 36  There may be a family history of similar lesions  Eruptive cherry angiomas have been rarely reported to be associated with internal malignancy  The cause of angiomas is unknown  Genetic analysis of cherry angiomas has shown that they frequently carry specific somatic missense mutations in the GNAQ and GNA11 (Q209H) genes, which are involved in other vascular and melanocytic proliferations  SEBORRHEIC KERATOSIS; NON-INFLAMED    Assessment and Plan:  Based on a thorough discussion of this condition and the management approach to it (including a comprehensive discussion of the known risks, side effects and potential benefits of treatment), the patient (family) agrees to implement the following specific plan:  Monitor for changes  Benign, reassured      Seborrheic Keratosis  A seborrheic keratosis is a harmless warty spot that appears during adult life as a common sign of skin aging  Seborrheic keratoses can arise on any area of skin, covered or uncovered, with the usual exception of the palms and soles  They do not arise from mucous membranes   Seborrheic " "keratoses can have highly variable appearance  Seborrheic keratoses are extremely common  It has been estimated that over 90% of adults over the age of 61 years have one or more of them  They occur in males and females of all races, typically beginning to erupt in the 35s or 45s  They are uncommon under the age of 21 years  The precise cause of seborrhoeic keratoses is not known  Seborrhoeic keratoses are considered degenerative in nature  As time goes by, seborrheic keratoses tend to become more numerous  Some people inherit a tendency to develop a very large number of them; some people may have hundreds of them  There is no easy way to remove multiple lesions on a single occasion  Unless a specific lesion is \"inflamed\" and is causing pain or stinging/burning or is bleeding, most insurance companies do not authorize treatment  XEROSIS (\"DRY SKIN\")    Assessment and Plan:  Based on a thorough discussion of this condition and the management approach to it (including a comprehensive discussion of the known risks, side effects and potential benefits of treatment), the patient (family) agrees to implement the following specific plan:  Use moisturizer like Eucerin,Cerave or Aveeno Cream 3 times a day for the dry skin            Dry skin refers to skin that feels dry to touch  Dry skin has a dull surface with a rough, scaly quality  The skin is less pliable and cracked  When dryness is severe, the skin may become inflamed and fissured  Although any body site can be dry, dry skin tends to affect the shins more than any other site  Dry skin is lacking moisture in the outer horny cell layer (stratum corneum) and this results in cracks in the skin surface  Dry skin is also called xerosis, xeroderma or asteatosis (lack of fat)  It can affect males and females of all ages  There is some racial variability in water and lipid content of the skin    Dry skin that starts in early childhood may be one of about " 20 types of ichthyosis (fish-scale skin)  There is often a family history of dry skin  Dry skin is commonly seen in people with atopic dermatitis  Nearly everyone > 60 years has dry skin  Dry skin that begins later may be seen in people with certain diseases and conditions  Postmenopausal women  Hypothyroidism  Chronic renal disease   Malnutrition and weight loss   Subclinical dermatitis   Treatment with certain drugs such as oral retinoids, diuretics and epidermal growth factor receptor inhibitors      What is the treatment for dry skin? The mainstay of treatment of dry skin and ichthyosis is moisturisers/emollients  They should be applied liberally and often enough to:  Reduce itch   Improve the barrier function   Prevent entry of irritants, bacteria   Reduce transepidermal water loss  How can dry skin be prevented? Eliminate aggravating factors:  Reduce the frequency of bathing  A humidifier in winter and air conditioner in summer   Compare having a short shower with a prolonged soak in a bath  Use lukewarm, not hot, water  Replace standard soap with a substitute such as a synthetic detergent cleanser, water-miscible emollient, bath oil, anti-pruritic tar oil, colloidal oatmeal etc    Apply an emollient liberally and often, particularly shortly after bathing, and when itchy  The drier the skin, the thicker this should be, especially on the hands  What is the outlook for dry skin? A tendency to dry skin may persist life-long, or it may improve once contributing factors are controlled      ACTINIC KERATOSIS    Based on a thorough discussion of this condition and the management approach to it (including a comprehensive discussion of the known risks, side effects and potential benefits of treatment), the patient (family) agrees to implement the following specific plan:  When outside we recommend using a wide brim hat, sunglasses, long sleeve and pants, sunscreen with SPF 22+ with reapplication every 2 hours, or SPF specific clothing   liquid nitrogen to treat areas  Consent obtained  Expect area to blister, crust, and then fall off within 2 weeks  Please use vaseline  PROCEDURE:  DESTRUCTION OF PRE-MALIGNANT LESIONS  After a thorough discussion of treatment options and risk/benefits/alternatives (including but not limited to local pain, scarring, dyspigmentation, blistering, and possible superinfection), verbal and written consent were obtained and the aforementioned lesions were treated on with cryotherapy using liquid nitrogen x 1 cycle for 5-10 seconds  TOTAL NUMBER of 1 pre-malignant lesions were treated today on the ANATOMIC LOCATION: left earlobe  The patient tolerated the procedure well, and after-care instructions were provided

## 2023-05-03 NOTE — PROGRESS NOTES
THIS IS A COSMETIC PATIENT WHO PAID OUT OF POCKET AT TIME OF VISIT  DO NOT BILL  $20  THE PATIENT ALREADY PAID IN FULL FOR SERVICES      PROCEDURE:  DESTRUCTION OF BENIGN LESIONS WITH CRYOTHERAPY  After a thorough discussion of treatment options and risk/benefits/alternatives (including but not limited to local pain, scarring, dyspigmentation, blistering, and possible superinfection), verbal and written consent were obtained and the aforementioned lesions were treated on with cryotherapy using liquid nitrogen x 1 cycle for 5-10 seconds  TOTAL NUMBER of 1 lesions were treated today on the ANATOMIC LOCATION: left scalp  The patient tolerated the procedure well, and after-care instructions were provided      Scribe Attestation    I,:  Nathaly Weston am acting as a scribe while in the presence of the attending physician :       I,:  Ludivina Carr MD personally performed the services described in this documentation    as scribed in my presence :

## 2023-05-03 NOTE — PATIENT INSTRUCTIONS
PROCEDURE:  DESTRUCTION OF BENIGN LESIONS WITH CRYOTHERAPY  After a thorough discussion of treatment options and risk/benefits/alternatives (including but not limited to local pain, scarring, dyspigmentation, blistering, and possible superinfection), verbal and written consent were obtained and the aforementioned lesions were treated on with cryotherapy using liquid nitrogen x 1 cycle for 5-10 seconds  TOTAL NUMBER of 1 lesions were treated today on the ANATOMIC LOCATION: left scalp  The patient tolerated the procedure well, and after-care instructions were provided

## 2023-05-03 NOTE — PROGRESS NOTES
"[facility] Note     Patient Name: Dasha Han  Encounter Date: 05/03/2023     Have you been cared for by a [place] [id number] Dermatologist in the last 3 years and, if so, which description applies to you? Yes  I have been here within the last 3 years, and my medical history has NOT changed since that time  I am MALE/not capable of bearing children  REVIEW OF SYSTEMS:  Have you recently had or currently have any of the following? · No changes in my recent health  PAST MEDICAL HISTORY:  Have you personally ever had or currently have any of the following? If \"YES,\" then please provide more detail  · No changes in my medical history  FAMILY HISTORY:  Any \"first degree relatives\" (parent, brother, sister, or child) with the following?  No changes in my family's known health  PATIENT EXPERIENCE:     Do you want the Dermatologist to perform a COMPLETE skin exam today including a clinical examination under the \"bra and underwear\" areas? Yes   If necessary, do we have your permission to call and leave a detailed message on your Preferred Phone number that includes your specific medical information?   Yes      Allergies   Allergen Reactions    Ramipril Cough      Current Outpatient Medications:     sotalol (BETAPACE) 80 mg tablet, TAKE 1 AND 1/2 TABLETS BY  MOUTH TWICE DAILY, Disp: 270 tablet, Rfl: 3    acetaminophen (TYLENOL) 650 mg CR tablet, Take 1,300 mg by mouth every 8 (eight) hours as needed, Disp: , Rfl:     apixaban (ELIQUIS) 5 mg, Take 1 tablet (5 mg total) by mouth 2 (two) times a day, Disp: 180 tablet, Rfl: 3    Cream Base (PCCA LIPODERM BASE) CREA, PCCA Lipoderm Base cream, Disp: , Rfl:     fluticasone (FLONASE) 50 mcg/act nasal spray, fluticasone propionate 50 mcg/actuation nasal spray,suspension  instill 2 sprays into each nostril once daily, Disp: , Rfl:     simvastatin (ZOCOR) 20 mg tablet, TAKE 1 TABLET BY MOUTH  DAILY AT BEDTIME, Disp: 90 tablet, Rfl: 3    " "tamsulosin (FLOMAX) 0 4 mg, Take 0 8 mg by mouth daily with dinner, Disp: , Rfl:            Whom besides the patient is providing clinical information about today's encounter?   o NO ADDITIONAL HISTORIAN (patient alone provided history)    Physical Exam and Assessment/Plan by Diagnosis:    MELANOCYTIC NEVI (\"Moles\")    Physical Exam:   Anatomic Location Affected:   Mostly on sun-exposed areas of the trunk and extremities   Morphological Description:  Scattered, 1-4mm round to ovoid, symmetrical-appearing, even bordered, skin colored to dark brown macules/papules, mostly in sun-exposed areas   Pertinent Positives:   Pertinent Negatives: Additional History of Present Condition:      Assessment and Plan:  Based on a thorough discussion of this condition and the management approach to it (including a comprehensive discussion of the known risks, side effects and potential benefits of treatment), the patient (family) agrees to implement the following specific plan:   When outside we recommend using a wide brim hat, sunglasses, long sleeve and pants, sunscreen with SPF 90+ with reapplication every 2 hours, or SPF specific clothing    Benign, reassured   Annual skin check     Melanocytic Nevi  Melanocytic nevi (\"moles\") are tan or brown, raised or flat areas of the skin which have an increased number of melanocytes  Melanocytes are the cells in our body which make pigment and account for skin color  Some moles are present at birth (I e , \"congenital nevi\"), while others come up later in life (i e , \"acquired nevi\")  The sun can stimulate the body to make more moles  Sunburns are not the only thing that triggers more moles  Chronic sun exposure can do it too  Clinically distinguishing a healthy mole from melanoma may be difficult, even for experienced dermatologists   The \"ABCDE's\" of moles have been suggested as a means of helping to alert a person to a suspicious mole and the possible increased risk of " "melanoma  The suggestions for raising alert are as follows:    Asymmetry: Healthy moles tend to be symmetric, while melanomas are often asymmetric  Asymmetry means if you draw a line through the mole, the two halves do not match in color, size, shape, or surface texture  Asymmetry can be a result of rapid enlargement of a mole, the development of a raised area on a previously flat lesion, scaling, ulceration, bleeding or scabbing within the mole  Any mole that starts to demonstrate \"asymmetry\" should be examined promptly by a board certified dermatologist      Border: Healthy moles tend to have discrete, even borders  The border of a melanoma often blends into the normal skin and does not sharply delineate the mole from normal skin  Any mole that starts to demonstrate \"uneven borders\" should be examined promptly by a board certified dermatologist      Color: Healthy moles tend to be one color throughout  Melanomas tend to be made up of different colors ranging from dark black, blue, white, or red  Any mole that demonstrates a color change should be examined promptly by a board certified dermatologist      Diameter: Healthy moles tend to be smaller than 0 6 cm in size; an exception are \"congenital nevi\" that can be larger  Melanomas tend to grow and can often be greater than 0 6 cm (1/4 of an inch, or the size of a pencil eraser)  This is only a guideline, and many normal moles may be larger than 0 6 cm without being unhealthy  Any mole that starts to change in size (small to bigger or bigger to smaller) should be examined promptly by a board certified dermatologist      Evolving: Healthy moles tend to \"stay the same  \"  Melanomas may often show signs of change or evolution such as a change in size, shape, color, or elevation    Any mole that starts to itch, bleed, crust, burn, hurt, or ulcerate or demonstrate a change or evolution should be examined promptly by a board certified dermatologist   " LENTIGO    Physical Exam:   Anatomic Location Affected:  Trunk, arms   Morphological Description:  Light brown macules   Pertinent Positives:   Pertinent Negatives: Additional History of Present Condition:      Assessment and Plan:  Based on a thorough discussion of this condition and the management approach to it (including a comprehensive discussion of the known risks, side effects and potential benefits of treatment), the patient (family) agrees to implement the following specific plan:   When outside we recommend using a wide brim hat, sunglasses, long sleeve and pants, sunscreen with SPF 44+ with reapplication every 2 hours, or SPF specific clothing       What is a lentigo? A lentigo is a pigmented flat or slightly raised lesion with a clearly defined edge  Unlike an ephelis (freckle), it does not fade in the winter months  There are several kinds of lentigo  The name lentigo originally referred to its appearance resembling a small lentil  The plural of lentigo is lentigines, although lentigos is also in common use  Who gets lentigines? Lentigines can affect males and females of all ages and races  Solar lentigines are especially prevalent in fair skinned adults  Lentigines associated with syndromes are present at birth or arise during childhood  What causes lentigines? Common forms of lentigo are due to exposure to ultraviolet radiation:   Sun damage including sunburn    Indoor tanning    Phototherapy, especially photochemotherapy (PUVA)    Ionizing radiation, eg radiation therapy, can also cause lentigines  Several familial syndromes associated with widespread lentigines originate from mutations in Edwin-MAP kinase, mTOR signaling and PTEN pathways  What is the treatment for lentigines? Most lentigines are left alone  Attempts to lighten them may not be successful   The following approaches are used:   SPF 50+ broad-spectrum sunscreen    Hydroquinone bleaching cream    Alpha "hydroxy acids    Vitamin C    Retinoids    Azelaic acid    Chemical peels  Individual lesions can be permanently removed using:   Cryotherapy    Intense pulsed light    Pigment lasers    How can lentigines be prevented? Lentigines associated with exposure ultraviolet radiation can be prevented by very careful sun protection  Clothing is more successful at preventing new lentigines than are sunscreens  What is the outlook for lentigines? Lentigines usually persist  They may increase in number with age and sun exposure  Some in sun-protected sites may fade and disappear  ULLOA ANGIOMAS    Physical Exam:   Anatomic Location Affected:  trunk   Morphological Description:  Scattered cherry red, 1-4 mm papules   Pertinent Positives:   Pertinent Negatives: Additional History of Present Condition:      Assessment and Plan:  Based on a thorough discussion of this condition and the management approach to it (including a comprehensive discussion of the known risks, side effects and potential benefits of treatment), the patient (family) agrees to implement the following specific plan:   Monitor for changes   Benign, reassured       Assessment and Plan:    Cherry angioma, also known as Lulla Riches spots, are benign vascular skin lesions  A \"cherry angioma\" is a firm red, blue or purple papule, 0 1-1 cm in diameter  When thrombosed, they can appear black in colour until evaluated with a dermatoscope when the red or purple colour is more easily seen  Cherry angioma may develop on any part of the body but most often appear on the scalp, face, lips and trunk  An angioma is due to proliferating endothelial cells; these are the cells that line the inside of a blood vessel  Angiomas can arise in early life or later in life; the most common type of angioma is a cherry angioma  Cherry angiomas are very common in males and females of any age or race   They are more noticeable in white skin than in skin " "of colour  They markedly increase in number from about the age of 36  There may be a family history of similar lesions  Eruptive cherry angiomas have been rarely reported to be associated with internal malignancy  The cause of angiomas is unknown  Genetic analysis of cherry angiomas has shown that they frequently carry specific somatic missense mutations in the GNAQ and GNA11 (Q209H) genes, which are involved in other vascular and melanocytic proliferations  SEBORRHEIC KERATOSIS; NON-INFLAMED    Physical Exam:   Anatomic Location Affected:  trunk   Morphological Description:  Flat and raised, waxy, smooth to warty textured, yellow to brownish-grey to dark brown to blackish, discrete, \"stuck-on\" appearing papules   Pertinent Positives:   Pertinent Negatives: Additional History of Present Condition:  Left scalp; will schedule for cosmetic removal    Assessment and Plan:  Based on a thorough discussion of this condition and the management approach to it (including a comprehensive discussion of the known risks, side effects and potential benefits of treatment), the patient (family) agrees to implement the following specific plan:   Monitor for changes   Benign, reassured       Seborrheic Keratosis  A seborrheic keratosis is a harmless warty spot that appears during adult life as a common sign of skin aging  Seborrheic keratoses can arise on any area of skin, covered or uncovered, with the usual exception of the palms and soles  They do not arise from mucous membranes  Seborrheic keratoses can have highly variable appearance  Seborrheic keratoses are extremely common  It has been estimated that over 90% of adults over the age of 61 years have one or more of them  They occur in males and females of all races, typically beginning to erupt in the 35s or 45s  They are uncommon under the age of 21 years  The precise cause of seborrhoeic keratoses is not known    Seborrhoeic keratoses are considered " "degenerative in nature  As time goes by, seborrheic keratoses tend to become more numerous  Some people inherit a tendency to develop a very large number of them; some people may have hundreds of them  There is no easy way to remove multiple lesions on a single occasion  Unless a specific lesion is \"inflamed\" and is causing pain or stinging/burning or is bleeding, most insurance companies do not authorize treatment  XEROSIS (\"DRY SKIN\")    Physical Exam:   Anatomic Location Affected:  diffuse   Morphological Description:  xerosis   Pertinent Positives:   Pertinent Negatives: Additional History of Present Condition:      Assessment and Plan:  Based on a thorough discussion of this condition and the management approach to it (including a comprehensive discussion of the known risks, side effects and potential benefits of treatment), the patient (family) agrees to implement the following specific plan:   Use moisturizer like Eucerin,Cerave or Aveeno Cream 3 times a day for the dry skin               Dry skin refers to skin that feels dry to touch  Dry skin has a dull surface with a rough, scaly quality  The skin is less pliable and cracked  When dryness is severe, the skin may become inflamed and fissured  Although any body site can be dry, dry skin tends to affect the shins more than any other site  Dry skin is lacking moisture in the outer horny cell layer (stratum corneum) and this results in cracks in the skin surface  Dry skin is also called xerosis, xeroderma or asteatosis (lack of fat)  It can affect males and females of all ages  There is some racial variability in water and lipid content of the skin   Dry skin that starts in early childhood may be one of about 20 types of ichthyosis (fish-scale skin)  There is often a family history of dry skin   Dry skin is commonly seen in people with atopic dermatitis   Nearly everyone > 60 years has dry skin      Dry skin that begins later " may be seen in people with certain diseases and conditions   Postmenopausal women   Hypothyroidism   Chronic renal disease    Malnutrition and weight loss    Subclinical dermatitis    Treatment with certain drugs such as oral retinoids, diuretics and epidermal growth factor receptor inhibitors      What is the treatment for dry skin? The mainstay of treatment of dry skin and ichthyosis is moisturisers/emollients  They should be applied liberally and often enough to:   Reduce itch    Improve the barrier function    Prevent entry of irritants, bacteria    Reduce transepidermal water loss  How can dry skin be prevented? Eliminate aggravating factors:   Reduce the frequency of bathing   A humidifier in winter and air conditioner in summer    Compare having a short shower with a prolonged soak in a bath   Use lukewarm, not hot, water   Replace standard soap with a substitute such as a synthetic detergent cleanser, water-miscible emollient, bath oil, anti-pruritic tar oil, colloidal oatmeal etc     Apply an emollient liberally and often, particularly shortly after bathing, and when itchy  The drier the skin, the thicker this should be, especially on the hands  What is the outlook for dry skin? A tendency to dry skin may persist life-long, or it may improve once contributing factors are controlled      ACTINIC KERATOSIS    Physical Exam:   Anatomic Location Affected:  Left earlobe   Morphological Description:  Scaly pink papules   Pertinent Positives:   Pertinent Negatives:      Assessment and Plan:  Based on a thorough discussion of this condition and the management approach to it (including a comprehensive discussion of the known risks, side effects and potential benefits of treatment), the patient (family) agrees to implement the following specific plan:   When outside we recommend using a wide brim hat, sunglasses, long sleeve and pants, sunscreen with SPF 00+ with reapplication every 2 hours, or SPF specific clothing   liquid nitrogen to treat areas  Consent obtained  Expect area to blister, crust, and then fall off within 2 weeks  Please use vaseline  PROCEDURE:  DESTRUCTION OF PRE-MALIGNANT LESIONS  After a thorough discussion of treatment options and risk/benefits/alternatives (including but not limited to local pain, scarring, dyspigmentation, blistering, and possible superinfection), verbal and written consent were obtained and the aforementioned lesions were treated on with cryotherapy using liquid nitrogen x 1 cycle for 5-10 seconds  TOTAL NUMBER of 1 pre-malignant lesions were treated today on the ANATOMIC LOCATION: left earlobe  The patient tolerated the procedure well, and after-care instructions were provided         Scribe Attestation    I,:  Dior Shah am acting as a scribe while in the presence of the attending physician :       I,:  Leigha Powell MD personally performed the services described in this documentation    as scribed in my presence :

## 2023-10-23 DIAGNOSIS — E78.2 MIXED HYPERLIPIDEMIA: ICD-10-CM

## 2023-10-23 RX ORDER — SIMVASTATIN 20 MG
TABLET ORAL
Qty: 90 TABLET | Refills: 3 | Status: SHIPPED | OUTPATIENT
Start: 2023-10-23

## 2023-11-15 ENCOUNTER — OFFICE VISIT (OUTPATIENT)
Dept: FAMILY MEDICINE CLINIC | Facility: CLINIC | Age: 73
End: 2023-11-15
Payer: MEDICARE

## 2023-11-15 VITALS
HEART RATE: 69 BPM | BODY MASS INDEX: 22.01 KG/M2 | WEIGHT: 177 LBS | OXYGEN SATURATION: 97 % | SYSTOLIC BLOOD PRESSURE: 122 MMHG | HEIGHT: 75 IN | TEMPERATURE: 98 F | DIASTOLIC BLOOD PRESSURE: 78 MMHG

## 2023-11-15 DIAGNOSIS — Z85.828 HISTORY OF SKIN CANCER: ICD-10-CM

## 2023-11-15 DIAGNOSIS — Z00.00 ENCOUNTER FOR ANNUAL WELLNESS VISIT (AWV) IN MEDICARE PATIENT: ICD-10-CM

## 2023-11-15 DIAGNOSIS — Z23 ENCOUNTER FOR IMMUNIZATION: Primary | ICD-10-CM

## 2023-11-15 DIAGNOSIS — Z80.0 FAMILY HISTORY OF COLON CANCER IN MOTHER: ICD-10-CM

## 2023-11-15 DIAGNOSIS — I42.9 CARDIOMYOPATHY, UNSPECIFIED TYPE (HCC): ICD-10-CM

## 2023-11-15 DIAGNOSIS — E78.2 MIXED HYPERLIPIDEMIA: ICD-10-CM

## 2023-11-15 DIAGNOSIS — Z79.01 CURRENT USE OF LONG TERM ANTICOAGULATION: ICD-10-CM

## 2023-11-15 DIAGNOSIS — I48.4 ATYPICAL ATRIAL FLUTTER (HCC): ICD-10-CM

## 2023-11-15 DIAGNOSIS — I48.0 PAF (PAROXYSMAL ATRIAL FIBRILLATION) (HCC): ICD-10-CM

## 2023-11-15 DIAGNOSIS — N31.9 NEUROGENIC BLADDER: ICD-10-CM

## 2023-11-15 DIAGNOSIS — I71.20 THORACIC AORTIC ANEURYSM WITHOUT RUPTURE, UNSPECIFIED PART (HCC): ICD-10-CM

## 2023-11-15 PROBLEM — R05.9 COUGH: Status: RESOLVED | Noted: 2020-05-01 | Resolved: 2023-11-15

## 2023-11-15 PROCEDURE — 99214 OFFICE O/P EST MOD 30 MIN: CPT | Performed by: STUDENT IN AN ORGANIZED HEALTH CARE EDUCATION/TRAINING PROGRAM

## 2023-11-15 PROCEDURE — G0008 ADMIN INFLUENZA VIRUS VAC: HCPCS

## 2023-11-15 PROCEDURE — 90662 IIV NO PRSV INCREASED AG IM: CPT

## 2023-11-15 PROCEDURE — G0439 PPPS, SUBSEQ VISIT: HCPCS | Performed by: STUDENT IN AN ORGANIZED HEALTH CARE EDUCATION/TRAINING PROGRAM

## 2023-11-15 NOTE — PROGRESS NOTES
Assessment and Plan:     Problem List Items Addressed This Visit        Cardiovascular and Mediastinum    Cardiomyopathy ([address])     Follows with Dr Jewel Castro and Dr Wanda Back. Stable on sotalol, on eliquis         Relevant Orders    Lipid panel    Comprehensive metabolic panel    CBC and differential    Magnesium    Atrial flutter ([address])     Follows with Dr Jewel Castro and Dr Wanda Back. Stable on sotalol, on eliquis         Thoracic aortic aneurysm without rupture ([address])       Other    Mixed hyperlipidemia    Relevant Orders    Lipid panel    Comprehensive metabolic panel    Neurogenic bladder     Followsw [facility] urologist, Dr Brenden Mcginnis. continue Flomax. Currently self cathing twice a day and gets 500 mL but voids his bladder normally during the day          Current use of long term anticoagulation    History of skin cancer     Follows with derm         Family history of colon cancer in mother     Due for repeat 2025        Other Visit Diagnoses     Encounter for immunization    -  Primary    Relevant Orders    influenza vaccine, high-dose, PF 0.7 mL (FLUZONE HIGH-DOSE) (Completed)    PAF (paroxysmal atrial fibrillation) ([address])        Relevant Orders    Lipid panel    Comprehensive metabolic panel    CBC and differential    Magnesium    Encounter for annual wellness visit (AWV) in Medicare patient              Depression Screening and Follow-up Plan: Patient was screened for depression during today's encounter. They screened negative with a PHQ-2 score of 0. Will get shingles vaccine at pharmacy   Preventive health issues were discussed with patient, and age appropriate screening tests were ordered as noted in patient's After Visit Summary. Personalized health advice and appropriate referrals for health education or preventive services given if needed, as noted in patient's After Visit Summary.      History of Present Illness:     Patient presents for a Medicare Wellness Visit    HPI     Follows with [facility], Dr Tracy Manzanares    Psa 2.4 with Dr Byron Joseph in [place]. No issues. Still needs to catherize BID    Cardio: Dr Yoly Black and Dr Anirudh Vallejo (    Patient Care Team:  Garo Milner MD as PCP - General (Family Medicine)  Ida Mayfield MD     Review of Systems:     Review of Systems   Constitutional:  Negative for chills, fatigue and fever. HENT:  Negative for rhinorrhea and sore throat. Eyes:  Negative for visual disturbance. Respiratory:  Negative for cough and shortness of breath. Cardiovascular:  Negative for chest pain and palpitations. Gastrointestinal:  Negative for abdominal pain, constipation, diarrhea, nausea and vomiting. Genitourinary:  Negative for difficulty urinating, dysuria and frequency. Musculoskeletal:  Negative for arthralgias and myalgias. Skin:  Negative for color change and rash. Neurological:  Negative for weakness and headaches.         Problem List:     Patient Active Problem List   Diagnosis   • Cardiomyopathy [facility])   • Mixed hyperlipidemia   • Essential hypertension   • Atrial flutter (HCC)   • Neurogenic bladder   • Current use of long term anticoagulation   • History of total hip arthroplasty   • Thoracic aortic aneurysm without rupture ([address])   • History of skin cancer   • Lung nodule < 6cm on CT   • History of tobacco use   • Family history of colon cancer in mother      Past Medical and Surgical History:     Past Medical History:   Diagnosis Date   • Atrial fibrillation [facility])    • Benign hypertension    • Blood coagulation disorder ([address])    • Chronic bilateral low back pain without sciatica 04/24/2018   • Colon polyp    • Hip arthritis 01/22/2019   • Hypertriglyceridemia    • Lipid metabolism disorder    • Neurogenic bladder    • Other postprocedural cardiac functional disturbances following cardiac surgery    • Preop examination 01/22/2019   • Squamous cell skin cancer     scalp     Past Surgical History:   Procedure Laterality Date   • COLONOSCOPY     • HERNIA REPAIR     • HIP SURGERY Right 02/2019   • KNEE SURGERY Bilateral • LIPOMA RESECTION        Family History:     Family History   Problem Relation Age of Onset   • Diabetes Mother    • Colon cancer Mother    • Lung cancer Father       Social History:     Social History     Socioeconomic History   • Marital status: /Civil Union     Spouse name: None   • Number of children: None   • Years of education: None   • Highest education level: None   Occupational History     Comment: employed   Tobacco Use   • Smoking status: Former   • Smokeless tobacco: Never   • Tobacco comments:     quite in 1972   Vaping Use   • Vaping Use: Never used   Substance and Sexual Activity   • Alcohol use: Yes     Alcohol/week: 1.0 standard drink of alcohol     Types: 1 Glasses of wine per week     Comment: each night   • Drug use: No   • Sexual activity: None   Other Topics Concern   • None   Social History Narrative    Always uses a seatbelt    Daily caffeine consumption, 2-3 servings per day    Seeing a dentist     Social Determinants of Health     Financial Resource Strain: Low Risk  (11/8/2023)    Overall Financial Resource Strain (CARDIA)    • Difficulty of Paying Living Expenses: Not hard at all   Food Insecurity: Not on file   Transportation Needs: No Transportation Needs (11/8/2023)    PRAPARE - Transportation    • Lack of Transportation (Medical): No    • Lack of Transportation (Non-Medical):  No   Physical Activity: Not on file   Stress: Not on file   Social Connections: Not on file   Intimate Partner Violence: Not on file   Housing Stability: Not on file      Medications and Allergies:     Current Outpatient Medications   Medication Sig Dispense Refill   • acetaminophen (TYLENOL) 650 mg CR tablet Take 1,300 mg by mouth every 8 (eight) hours as needed     • Cream Base (PCCA LIPODERM BASE) CREA PCCA Lipoderm Base cream     • fluticasone (FLONASE) 50 mcg/act nasal spray fluticasone propionate 50 mcg/actuation nasal spray,suspension   instill 2 sprays into each nostril once daily     • simvastatin (ZOCOR) 20 mg tablet TAKE 1 TABLET BY MOUTH DAILY AT  BEDTIME 90 tablet 3   • sotalol (BETAPACE) 80 mg tablet TAKE 1 AND 1/2 TABLETS BY  MOUTH TWICE DAILY 270 tablet 3   • tamsulosin (FLOMAX) 0.4 mg Take 0.8 mg by mouth daily with dinner     • apixaban (ELIQUIS) 5 mg Take 1 tablet (5 mg total) by mouth 2 (two) times a day 180 tablet 3     No current facility-administered medications for this visit. Allergies   Allergen Reactions   • Ramipril Cough      Immunizations:     Immunization History   Administered Date(s) Administered   • COVID-19 MODERNA VACC 0.5 ML IM 02/26/2021, 03/26/2021, 12/08/2021   • COVID-19 PFIZER VACCINE 0.3 ML IM 10/03/2022   • INFLUENZA 10/01/2021   • Influenza, high dose seasonal 0.7 mL 10/03/2022, 11/15/2023   • Pneumococcal Conjugate 13-Valent 09/28/2020   • Pneumococcal Polysaccharide PPV23 10/01/2021      Health Maintenance:         Topic Date Due   • Colorectal Cancer Screening  05/24/2025   • Hepatitis C Screening  Completed         Topic Date Due   • COVID-19 Vaccine (5 - Moderna series) 11/28/2022      Medicare Screening Tests and Risk Assessments:     Denisa Patel is here for his Subsequent Wellness visit. Last Medicare Wellness visit information reviewed, patient interviewed, no change since last AWV. Health Risk Assessment:   Patient rates overall health as very good. Patient feels that their physical health rating is same. Patient is very satisfied with their life. Eyesight was rated as same. Hearing was rated as same. Patient feels that their emotional and mental health rating is same. Patients states they are never, rarely angry. Patient states they are never, rarely unusually tired/fatigued. Pain experienced in the last 7 days has been some. Patient's pain rating has been 3/10. Patient states that he has experienced no weight loss or gain in last 6 months. Depression Screening:   PHQ-2 Score: 0      Fall Risk Screening:    In the past year, patient has experienced: no history of falling in past year      Home Safety:  Patient does not have trouble with stairs inside or outside of their home. Patient has working smoke alarms and has working carbon monoxide detector. Home safety hazards include: none. Nutrition:   Current diet is Regular. Medications:   Patient is currently taking over-the-counter supplements. OTC medications include: see medication list. Patient is able to manage medications. Activities of Daily Living (ADLs)/Instrumental Activities of Daily Living (IADLs):   Walk and transfer into and out of bed and chair?: Yes  Dress and groom yourself?: Yes    Bathe or shower yourself?: Yes    Feed yourself? Yes  Do your laundry/housekeeping?: Yes  Manage your money, pay your bills and track your expenses?: Yes  Make your own meals?: Yes    Do your own shopping?: Yes    Previous Hospitalizations:   Any hospitalizations or ED visits within the last 12 months?: No      Advance Care Planning:   Living will: Yes    Durable POA for healthcare: Yes    Advanced directive: Yes      PREVENTIVE SCREENINGS      Cardiovascular Screening:    General: Screening Not Indicated and History Lipid Disorder      Colorectal Cancer Screening:     General: Screening Current      Abdominal Aortic Aneurysm (AAA) Screening:    Risk factors include: age between 70-75 yo and tobacco use        Lung Cancer Screening:     General: Screening Not Indicated      Hepatitis C Screening:    General: Screening Current    Screening, Brief Intervention, and Referral to Treatment (SBIRT)    Screening  Typical number of drinks in a day: 1  Typical number of drinks in a week: 7  Interpretation: Low risk drinking behavior. AUDIT-C Screenin) How often did you have a drink containing alcohol in the past year? 4 or more times a week  2) How many drinks did you have on a typical day when you were drinking in the past year?  1 to 2  3) How often did you have 6 or more drinks on one occasion in the past year? never    AUDIT-C Score: 4  Interpretation: Score 4-12 (male): POSITIVE screen for alcohol misuse    AUDIT Screenin) How often during the last year have you found that you were not able to stop drinking once you had started? 0 - never  5) How often during the last year have you failed to do what was normally expected from you because of drinking? 0 - never  6) How often during the last year have you needed a first drink in the morning to get yourself going after a heavy drinking session? 0 - never  7) How often during the last year have you had a feeling of guilt or remorse after drinking? 0 - never  8) How often during the last year have you been unable to remember what happened the night before because you had been drinking? 0 - never  9) Have you or someone else been injured as a result of your drinking? 0 - no  10) Has a relative or friend or a doctor or another health worker been concerned about your drinking or suggested you cut down? 0 - no    AUDIT Score: 4  Interpretation: Low risk alcohol consumption    Single Item Drug Screening:  How often have you used an illegal drug (including marijuana) or a prescription medication for non-medical reasons in the past year? never    Single Item Drug Screen Score: 0  Interpretation: Negative screen for possible drug use disorder    No results found. Physical Exam:     /78   Pulse 69   Temp 98 °F (36.7 °C)   Ht 6' 3" (1.905 m)   Wt 80.3 kg (177 lb)   SpO2 97%   BMI 22.12 kg/m²     Physical Exam  Constitutional:       General: He is not in acute distress. Appearance: Normal appearance. He is not ill-appearing. HENT:      Head: Normocephalic and atraumatic. Right Ear: Tympanic membrane, ear canal and external ear normal.      Left Ear: Tympanic membrane, ear canal and external ear normal.      Nose: Nose normal.      Mouth/Throat:      Mouth: Mucous membranes are moist.      Pharynx: Oropharynx is clear.  No oropharyngeal exudate or posterior oropharyngeal erythema. Eyes:      General: No scleral icterus. Right eye: No discharge. Left eye: No discharge. Extraocular Movements: Extraocular movements intact. Conjunctiva/sclera: Conjunctivae normal.      Pupils: Pupils are equal, round, and reactive to light. Cardiovascular:      Rate and Rhythm: Normal rate and regular rhythm. Pulses: Normal pulses. Heart sounds: Normal heart sounds. No murmur heard. Pulmonary:      Effort: Pulmonary effort is normal. No respiratory distress. Breath sounds: Normal breath sounds. Abdominal:      General: Bowel sounds are normal.      Palpations: Abdomen is soft. Tenderness: There is no abdominal tenderness. Musculoskeletal:         General: Normal range of motion. Cervical back: Normal range of motion and neck supple. Lymphadenopathy:      Cervical: No cervical adenopathy. Skin:     General: Skin is warm and dry. Capillary Refill: Capillary refill takes less than 2 seconds. Neurological:      General: No focal deficit present. Mental Status: He is alert and oriented to person, place, and time. Mental status is at baseline. Cranial Nerves: No cranial nerve deficit.    Psychiatric:         Mood and Affect: Mood normal.          Kelly Bazan MD

## 2023-11-15 NOTE — ASSESSMENT & PLAN NOTE
[facility], Dr Gwyn Keller. continue Flomax.   Currently self cathing twice a day and gets 500 mL but voids his bladder normally during the day

## 2023-11-15 NOTE — PATIENT INSTRUCTIONS
Medicare Preventive Visit Patient Instructions  Thank you for completing your Welcome to Medicare Visit or Medicare Annual Wellness Visit today. Your next wellness visit will be due in one year (11/15/2024). The screening/preventive services that you may require over the next 5-10 years are detailed below. Some tests may not apply to you based off risk factors and/or age. Screening tests ordered at today's visit but not completed yet may show as past due. Also, please note that scanned in results may not display below. Preventive Screenings:  Service Recommendations Previous Testing/Comments   Colorectal Cancer Screening  Colonoscopy    Fecal Occult Blood Test (FOBT)/Fecal Immunochemical Test (FIT)  Fecal DNA/Cologuard Test  Flexible Sigmoidoscopy Age: 43-73 years old   Colonoscopy: every 10 years (May be performed more frequently if at higher risk)  OR  FOBT/FIT: every 1 year  OR  Cologuard: every 3 years  OR  Sigmoidoscopy: every 5 years  Screening may be recommended earlier than age 39 if at higher risk for colorectal cancer. Also, an individualized decision between you and your healthcare provider will decide whether screening between the ages of 77-80 would be appropriate.  Colonoscopy: 05/25/2022  FOBT/FIT: Not on file  Cologuard: Not on file  Sigmoidoscopy: Not on file          Prostate Cancer Screening Individualized decision between patient and health care provider in men between ages of 53-66   Medicare will cover every 12 months beginning on the day after your 50th birthday PSA: No results in last 5 years           Hepatitis C Screening Once for adults born between 1945 and 1965  More frequently in patients at high risk for Hepatitis C Hep C Antibody: 11/07/2017        Diabetes Screening 1-2 times per year if you're at risk for diabetes or have pre-diabetes Fasting glucose: 98 mg/dL (12/7/2021)  A1C: No results in last 5 years (No results in last 5 years)      Cholesterol Screening Once every 5 years if you don't have a lipid disorder. May order more often based on risk factors. Lipid panel: 10/31/2022         Other Preventive Screenings Covered by Medicare:  Abdominal Aortic Aneurysm (AAA) Screening: covered once if your at risk. You're considered to be at risk if you have a family history of AAA or a male between the age of 70-76 who smoking at least 100 cigarettes in your lifetime. Lung Cancer Screening: covers low dose CT scan once per year if you meet all of the following conditions: (1) Age 48-67; (2) No signs or symptoms of lung cancer; (3) Current smoker or have quit smoking within the last 15 years; (4) You have a tobacco smoking history of at least 20 pack years (packs per day x number of years you smoked); (5) You get a written order from a healthcare provider. Glaucoma Screening: covered annually if you're considered high risk: (1) You have diabetes OR (2) Family history of glaucoma OR (3)  aged 48 and older OR (3)  American aged 72 and older  Osteoporosis Screening: covered every 2 years if you meet one of the following conditions: (1) Have a vertebral abnormality; (2) On glucocorticoid therapy for more than 3 months; (3) Have primary hyperparathyroidism; (4) On osteoporosis medications and need to assess response to drug therapy. HIV Screening: covered annually if you're between the age of 14-79. Also covered annually if you are younger than 13 and older than 72 with risk factors for HIV infection. For pregnant patients, it is covered up to 3 times per pregnancy.     Immunizations:  Immunization Recommendations   Influenza Vaccine Annual influenza vaccination during flu season is recommended for all persons aged >= 6 months who do not have contraindications   Pneumococcal Vaccine   * Pneumococcal conjugate vaccine = PCV13 (Prevnar 13), PCV15 (Vaxneuvance), PCV20 (Prevnar 20)  * Pneumococcal polysaccharide vaccine = PPSV23 (Pneumovax) Adults 29-66 yo with certain risk factors or if 65+ yo  If never received any pneumonia vaccine: recommend Prevnar 21 (PCV20)  Give PCV20 if previously received 1 dose of PCV13 or PPSV23   Hepatitis B Vaccine 3 dose series if at intermediate or high risk (ex: diabetes, end stage renal disease, liver disease)   Respiratory syncytial virus (RSV) Vaccine - COVERED BY MEDICARE PART D  * RSVPreF3 (Arexvy) CDC recommends that adults 61years of age and older may receive a single dose of RSV vaccine using shared clinical decision-making (SCDM)   Tetanus (Td) Vaccine - COST NOT COVERED BY MEDICARE PART B Following completion of primary series, a booster dose should be given every 10 years to maintain immunity against tetanus. Td may also be given as tetanus wound prophylaxis. Tdap Vaccine - COST NOT COVERED BY MEDICARE PART B Recommended at least once for all adults. For pregnant patients, recommended with each pregnancy. Shingles Vaccine (Shingrix) - COST NOT COVERED BY MEDICARE PART B  2 shot series recommended in those 19 years and older who have or will have weakened immune systems or those 50 years and older     Health Maintenance Due:      Topic Date Due   • Colorectal Cancer Screening  05/24/2025   • Hepatitis C Screening  Completed     Immunizations Due:      Topic Date Due   • COVID-19 Vaccine (5 - Moderna series) 11/28/2022   • Influenza Vaccine (1) 09/01/2023     Advance Directives   What are advance directives? Advance directives are legal documents that state your wishes and plans for medical care. These plans are made ahead of time in case you lose your ability to make decisions for yourself. Advance directives can apply to any medical decision, such as the treatments you want, and if you want to donate organs. What are the types of advance directives? There are many types of advance directives, and each state has rules about how to use them. You may choose a combination of any of the following:  Living will:   This is a written record of the treatment you want. You can also choose which treatments you do not want, which to limit, and which to stop at a certain time. This includes surgery, medicine, IV fluid, and tube feedings. Durable power of  for [facility]): This is a written record that states who you want to make healthcare choices for you when you are unable to make them for yourself. This person, called a proxy, is usually a family member or a friend. You may choose more than 1 proxy. Do not resuscitate (DNR) order:  A DNR order is used in case your heart stops beating or you stop breathing. It is a request not to have certain forms of treatment, such as CPR. A DNR order may be included in other types of advance directives. Medical directive: This covers the care that you want if you are in a coma, near death, or unable to make decisions for yourself. You can list the treatments you want for each condition. Treatment may include pain medicine, surgery, blood transfusions, dialysis, IV or tube feedings, and a ventilator (breathing machine). Values history: This document has questions about your views, beliefs, and how you feel and think about life. This information can help others choose the care that you would choose. Why are advance directives important? An advance directive helps you control your care. Although spoken wishes may be used, it is better to have your wishes written down. Spoken wishes can be misunderstood, or not followed. Treatments may be given even if you do not want them. An advance directive may make it easier for your family to make difficult choices about your care. © Copyright [employer] 2018 Information is for End User's use only and may not be sold, redistributed or otherwise used for commercial purposes.  All illustrations and images included in CareNotes® are the copyrighted property of [employer] or  [employer]

## 2023-12-21 LAB
ALBUMIN SERPL-MCNC: 4.3 G/DL (ref 3.8–4.8)
ALBUMIN/GLOB SERPL: 1.7 {RATIO} (ref 1.2–2.2)
ALP SERPL-CCNC: 103 IU/L (ref 44–121)
ALT SERPL-CCNC: 20 IU/L (ref 0–44)
AST SERPL-CCNC: 26 IU/L (ref 0–40)
BASOPHILS # BLD AUTO: 0 X10E3/UL (ref 0–0.2)
BASOPHILS NFR BLD AUTO: 0 %
BILIRUB SERPL-MCNC: 0.7 MG/DL (ref 0–1.2)
BUN SERPL-MCNC: 20 MG/DL (ref 8–27)
BUN/CREAT SERPL: 21 (ref 10–24)
CALCIUM SERPL-MCNC: 9.5 MG/DL (ref 8.6–10.2)
CHLORIDE SERPL-SCNC: 102 MMOL/L (ref 96–106)
CHOLEST SERPL-MCNC: 134 MG/DL (ref 100–199)
CO2 SERPL-SCNC: 24 MMOL/L (ref 20–29)
CREAT SERPL-MCNC: 0.96 MG/DL (ref 0.76–1.27)
EGFR: 85 ML/MIN/1.73
EOSINOPHIL # BLD AUTO: 0.1 X10E3/UL (ref 0–0.4)
EOSINOPHIL NFR BLD AUTO: 2 %
ERYTHROCYTE [DISTWIDTH] IN BLOOD BY AUTOMATED COUNT: 11.9 % (ref 11.6–15.4)
GLOBULIN SER-MCNC: 2.5 G/DL (ref 1.5–4.5)
GLUCOSE SERPL-MCNC: 86 MG/DL (ref 70–99)
HCT VFR BLD AUTO: 45.4 % (ref 37.5–51)
HDLC SERPL-MCNC: 53 MG/DL
HGB BLD-MCNC: 15.7 G/DL (ref 13–17.7)
IMM GRANULOCYTES # BLD: 0 X10E3/UL (ref 0–0.1)
IMM GRANULOCYTES NFR BLD: 0 %
LDLC SERPL CALC-MCNC: 60 MG/DL (ref 0–99)
LYMPHOCYTES # BLD AUTO: 1.6 X10E3/UL (ref 0.7–3.1)
LYMPHOCYTES NFR BLD AUTO: 24 %
MAGNESIUM SERPL-MCNC: 2 MG/DL (ref 1.6–2.3)
MCH RBC QN AUTO: 34.2 PG (ref 26.6–33)
MCHC RBC AUTO-ENTMCNC: 34.6 G/DL (ref 31.5–35.7)
MCV RBC AUTO: 99 FL (ref 79–97)
MONOCYTES # BLD AUTO: 0.7 X10E3/UL (ref 0.1–0.9)
MONOCYTES NFR BLD AUTO: 10 %
NEUTROPHILS # BLD AUTO: 4.4 X10E3/UL (ref 1.4–7)
NEUTROPHILS NFR BLD AUTO: 64 %
PLATELET # BLD AUTO: 174 X10E3/UL (ref 150–450)
POTASSIUM SERPL-SCNC: 4.7 MMOL/L (ref 3.5–5.2)
PROT SERPL-MCNC: 6.8 G/DL (ref 6–8.5)
RBC # BLD AUTO: 4.59 X10E6/UL (ref 4.14–5.8)
SL AMB VLDL CHOLESTEROL CALC: 21 MG/DL (ref 5–40)
SODIUM SERPL-SCNC: 140 MMOL/L (ref 134–144)
TRIGL SERPL-MCNC: 116 MG/DL (ref 0–149)
WBC # BLD AUTO: 6.8 X10E3/UL (ref 3.4–10.8)

## 2024-01-02 ENCOUNTER — OFFICE VISIT (OUTPATIENT)
Dept: DERMATOLOGY | Facility: CLINIC | Age: 74
End: 2024-01-02
Payer: MEDICARE

## 2024-01-02 VITALS — HEIGHT: 75 IN | WEIGHT: 173 LBS | TEMPERATURE: 98.3 F | BODY MASS INDEX: 21.51 KG/M2

## 2024-01-02 DIAGNOSIS — D22.62 MULTIPLE BENIGN MELANOCYTIC NEVI OF UPPER AND LOWER EXTREMITIES AND TRUNK: Primary | ICD-10-CM

## 2024-01-02 DIAGNOSIS — D18.01 CHERRY ANGIOMA: ICD-10-CM

## 2024-01-02 DIAGNOSIS — D22.72 MULTIPLE BENIGN MELANOCYTIC NEVI OF UPPER AND LOWER EXTREMITIES AND TRUNK: Primary | ICD-10-CM

## 2024-01-02 DIAGNOSIS — L82.1 SEBORRHEIC KERATOSES: ICD-10-CM

## 2024-01-02 DIAGNOSIS — L81.4 LENTIGO: ICD-10-CM

## 2024-01-02 DIAGNOSIS — D22.71 MULTIPLE BENIGN MELANOCYTIC NEVI OF UPPER AND LOWER EXTREMITIES AND TRUNK: Primary | ICD-10-CM

## 2024-01-02 DIAGNOSIS — L85.3 XEROSIS OF SKIN: ICD-10-CM

## 2024-01-02 DIAGNOSIS — L57.0 AK (ACTINIC KERATOSIS): ICD-10-CM

## 2024-01-02 DIAGNOSIS — D22.61 MULTIPLE BENIGN MELANOCYTIC NEVI OF UPPER AND LOWER EXTREMITIES AND TRUNK: Primary | ICD-10-CM

## 2024-01-02 DIAGNOSIS — D22.5 MULTIPLE BENIGN MELANOCYTIC NEVI OF UPPER AND LOWER EXTREMITIES AND TRUNK: Primary | ICD-10-CM

## 2024-01-02 PROCEDURE — 17000 DESTRUCT PREMALG LESION: CPT | Performed by: DERMATOLOGY

## 2024-01-02 PROCEDURE — 99214 OFFICE O/P EST MOD 30 MIN: CPT | Performed by: DERMATOLOGY

## 2024-01-02 NOTE — PATIENT INSTRUCTIONS
"MELANOCYTIC NEVI (\"Moles\")         Assessment and Plan:  Based on a thorough discussion of this condition and the management approach to it (including a comprehensive discussion of the known risks, side effects and potential benefits of treatment), the patient (family) agrees to implement the following specific plan:  When outside we recommend using a wide brim hat, sunglasses, long sleeve and pants, sunscreen with SPF 30+ with reapplication every 2 hours, or SPF specific clothing   Benign, reassured  Annual skin check     Melanocytic Nevi  Melanocytic nevi (\"moles\") are tan or brown, raised or flat areas of the skin which have an increased number of melanocytes. Melanocytes are the cells in our body which make pigment and account for skin color.    Some moles are present at birth (I.e., \"congenital nevi\"), while others come up later in life (i.e., \"acquired nevi\").  The sun can stimulate the body to make more moles.  Sunburns are not the only thing that triggers more moles.  Chronic sun exposure can do it too.     Clinically distinguishing a healthy mole from melanoma may be difficult, even for experienced dermatologists. The \"ABCDE's\" of moles have been suggested as a means of helping to alert a person to a suspicious mole and the possible increased risk of melanoma.  The suggestions for raising alert are as follows:    Asymmetry: Healthy moles tend to be symmetric, while melanomas are often asymmetric.  Asymmetry means if you draw a line through the mole, the two halves do not match in color, size, shape, or surface texture. Asymmetry can be a result of rapid enlargement of a mole, the development of a raised area on a previously flat lesion, scaling, ulceration, bleeding or scabbing within the mole.  Any mole that starts to demonstrate \"asymmetry\" should be examined promptly by a board certified dermatologist.     Border: Healthy moles tend to have discrete, even borders.  The border of a melanoma often blends " "into the normal skin and does not sharply delineate the mole from normal skin.  Any mole that starts to demonstrate \"uneven borders\" should be examined promptly by a board certified dermatologist.     Color: Healthy moles tend to be one color throughout.  Melanomas tend to be made up of different colors ranging from dark black, blue, white, or red.  Any mole that demonstrates a color change should be examined promptly by a board certified dermatologist.     Diameter: Healthy moles tend to be smaller than 0.6 cm in size; an exception are \"congenital nevi\" that can be larger.  Melanomas tend to grow and can often be greater than 0.6 cm (1/4 of an inch, or the size of a pencil eraser). This is only a guideline, and many normal moles may be larger than 0.6 cm without being unhealthy.  Any mole that starts to change in size (small to bigger or bigger to smaller) should be examined promptly by a board certified dermatologist.     Evolving: Healthy moles tend to \"stay the same.\"  Melanomas may often show signs of change or evolution such as a change in size, shape, color, or elevation.  Any mole that starts to itch, bleed, crust, burn, hurt, or ulcerate or demonstrate a change or evolution should be examined promptly by a board certified dermatologist.        LENTIGO        Assessment and Plan:  Based on a thorough discussion of this condition and the management approach to it (including a comprehensive discussion of the known risks, side effects and potential benefits of treatment), the patient (family) agrees to implement the following specific plan:  When outside we recommend using a wide brim hat, sunglasses, long sleeve and pants, sunscreen with SPF 30+ with reapplication every 2 hours, or SPF specific clothing       What is a lentigo?  A lentigo is a pigmented flat or slightly raised lesion with a clearly defined edge. Unlike an ephelis (freckle), it does not fade in the winter months. There are several kinds of " lentigo.  The name lentigo originally referred to its appearance resembling a small lentil. The plural of lentigo is lentigines, although “lentigos” is also in common use.    Who gets lentigines?  Lentigines can affect males and females of all ages and races. Solar lentigines are especially prevalent in fair skinned adults. Lentigines associated with syndromes are present at birth or arise during childhood.    What causes lentigines?  Common forms of lentigo are due to exposure to ultraviolet radiation:  Sun damage including sunburn   Indoor tanning   Phototherapy, especially photochemotherapy (PUVA)    Ionizing radiation, eg radiation therapy, can also cause lentigines.  Several familial syndromes associated with widespread lentigines originate from mutations in Edwin-MAP kinase, mTOR signaling and PTEN pathways.    What is the treatment for lentigines?  Most lentigines are left alone. Attempts to lighten them may not be successful. The following approaches are used:  SPF 50+ broad-spectrum sunscreen   Hydroquinone bleaching cream   Alpha hydroxy acids   Vitamin C   Retinoids   Azelaic acid   Chemical peels  Individual lesions can be permanently removed using:  Cryotherapy   Intense pulsed light   Pigment lasers    How can lentigines be prevented?  Lentigines associated with exposure ultraviolet radiation can be prevented by very careful sun protection. Clothing is more successful at preventing new lentigines than are sunscreens.    What is the outlook for lentigines?  Lentigines usually persist. They may increase in number with age and sun exposure. Some in sun-protected sites may fade and disappear.    ULLOA ANGIOMAS        Assessment and Plan:  Based on a thorough discussion of this condition and the management approach to it (including a comprehensive discussion of the known risks, side effects and potential benefits of treatment), the patient (family) agrees to implement the following specific plan:  Monitor  "for changes  Benign, reassured      Assessment and Plan:    Cherry angioma, also known as Davies de Chris spots, are benign vascular skin lesions. A \"cherry angioma\" is a firm red, blue or purple papule, 0.1-1 cm in diameter. When thrombosed, they can appear black in colour until evaluated with a dermatoscope when the red or purple colour is more easily seen. Cherry angioma may develop on any part of the body but most often appear on the scalp, face, lips and trunk.  An angioma is due to proliferating endothelial cells; these are the cells that line the inside of a blood vessel.    Angiomas can arise in early life or later in life; the most common type of angioma is a cherry angioma.  Cherry angiomas are very common in males and females of any age or race. They are more noticeable in white skin than in skin of colour. They markedly increase in number from about the age of 40. There may be a family history of similar lesions. Eruptive cherry angiomas have been rarely reported to be associated with internal malignancy. The cause of angiomas is unknown. Genetic analysis of cherry angiomas has shown that they frequently carry specific somatic missense mutations in the GNAQ and GNA11 (Q209H) genes, which are involved in other vascular and melanocytic proliferations.      SEBORRHEIC KERATOSIS; NON-INFLAMED        Assessment and Plan:  Based on a thorough discussion of this condition and the management approach to it (including a comprehensive discussion of the known risks, side effects and potential benefits of treatment), the patient (family) agrees to implement the following specific plan:  Monitor for changes  Benign, reassured      Seborrheic Keratosis  A seborrheic keratosis is a harmless warty spot that appears during adult life as a common sign of skin aging.  Seborrheic keratoses can arise on any area of skin, covered or uncovered, with the usual exception of the palms and soles. They do not arise from mucous " "membranes. Seborrheic keratoses can have highly variable appearance.      Seborrheic keratoses are extremely common. It has been estimated that over 90% of adults over the age of 60 years have one or more of them. They occur in males and females of all races, typically beginning to erupt in the 30s or 40s. They are uncommon under the age of 20 years.  The precise cause of seborrhoeic keratoses is not known.  Seborrhoeic keratoses are considered degenerative in nature. As time goes by, seborrheic keratoses tend to become more numerous. Some people inherit a tendency to develop a very large number of them; some people may have hundreds of them.      There is no easy way to remove multiple lesions on a single occasion.  Unless a specific lesion is \"inflamed\" and is causing pain or stinging/burning or is bleeding, most insurance companies do not authorize treatment.    XEROSIS (\"DRY SKIN\")      Assessment and Plan:  Based on a thorough discussion of this condition and the management approach to it (including a comprehensive discussion of the known risks, side effects and potential benefits of treatment), the patient (family) agrees to implement the following specific plan:  Use moisturizer like Eucerin,Cerave or Aveeno Cream 3 times a day for the dry skin            Dry skin refers to skin that feels dry to touch. Dry skin has a dull surface with a rough, scaly quality. The skin is less pliable and cracked. When dryness is severe, the skin may become inflamed and fissured.  Although any body site can be dry, dry skin tends to affect the shins more than any other site.    Dry skin is lacking moisture in the outer horny cell layer (stratum corneum) and this results in cracks in the skin surface.  Dry skin is also called xerosis, xeroderma or asteatosis (lack of fat).  It can affect males and females of all ages. There is some racial variability in water and lipid content of the skin.  Dry skin that starts in early " childhood may be one of about 20 types of ichthyosis (fish-scale skin). There is often a family history of dry skin.   Dry skin is commonly seen in people with atopic dermatitis.  Nearly everyone > 60 years has dry skin.    Dry skin that begins later may be seen in people with certain diseases and conditions.  Postmenopausal women  Hypothyroidism  Chronic renal disease   Malnutrition and weight loss   Subclinical dermatitis   Treatment with certain drugs such as oral retinoids, diuretics and epidermal growth factor receptor inhibitors      What is the treatment for dry skin?  The mainstay of treatment of dry skin and ichthyosis is moisturisers/emollients. They should be applied liberally and often enough to:  Reduce itch   Improve the barrier function   Prevent entry of irritants, bacteria   Reduce transepidermal water loss.      How can dry skin be prevented?  Eliminate aggravating factors:  Reduce the frequency of bathing.   A humidifier in winter and air conditioner in summer   Compare having a short shower with a prolonged soak in a bath.   Use lukewarm, not hot, water.   Replace standard soap with a substitute such as a synthetic detergent cleanser, water-miscible emollient, bath oil, anti-pruritic tar oil, colloidal oatmeal etc.   Apply an emollient liberally and often, particularly shortly after bathing, and when itchy. The drier the skin, the thicker this should be, especially on the hands.    What is the outlook for dry skin?  A tendency to dry skin may persist life-long, or it may improve once contributing factors are controlled.     ACTINIC KERATOSIS        Assessment and Plan:  Based on a thorough discussion of this condition and the management approach to it (including a comprehensive discussion of the known risks, side effects and potential benefits of treatment), the patient (family) agrees to implement the following specific plan:  When outside we recommend using a wide brim hat, sunglasses, long  sleeve and pants, sunscreen with SPF 30+ with reapplication every 2 hours, or SPF specific clothing   liquid nitrogen to treat areas. Consent obtained. Expect area to blister, crust, and then fall off within 2 weeks. Please use vaseline.                                                 [facility] Fluorouracil/calcipotriene cream: apply topically twice a day for 5 days to scalp, forehead. This medication will cause extreme redness, inflammation, open sores. You can apply plain Vaseline 20-30 minutes after applying the cream to help with the redness, inflammation and blistering. If it is very uncomfortable, please contact Dr. Dawson and she may order topical steroids. You can also take a break and try to resume and complete the 5 day course when able to. This medication was sent to a compounding pharmacy, [facility].                                                                                                                                                                                                                                                                               [facility]  We use this service to prescribe medications that are often not covered by insurance. Your physician will send your prescription to the pharmacy. You will receive an email from [email or web address] where you will follow the instructions within the email to provide your billing and shipping information. Your medicine will be shipped directly to you. If you have any questions, you can call [phone] or email [email or web address]   Patient has cream left over from last treatment, if needs more will send a message.

## 2024-01-02 NOTE — PROGRESS NOTES
"[facility] Note     Patient Name: Benjy Mae  Encounter Date: 01/02/2023    Have you been cared for by a [facility] Dermatologist in the last 3 years and, if so, which description applies to you?    Yes.  I have been here within the last 3 years, and my medical history has NOT changed since that time.  I am MALE/not capable of bearing children.    REVIEW OF SYSTEMS:  Have you recently had or currently have any of the following? No changes in my recent health.   PAST MEDICAL HISTORY:  Have you personally ever had or currently have any of the following?  If \"YES,\" then please provide more detail. No changes in my medical history.   HISTORY OF IMMUNOSUPPRESSION: Do you have a history of any of the following:  Systemic Immunosuppression such as Diabetes, Biologic or Immunotherapy, Chemotherapy, Organ Transplantation, Bone Marrow Transplantation?  No     Answering \"YES\" requires the addition of the dotphrase \"IMMUNOSUPPRESSED\" as the first diagnosis of the patient's visit.   FAMILY HISTORY:  Any \"first degree relatives\" (parent, brother, sister, or child) with the following?    No changes in my family's known health.   PATIENT EXPERIENCE:    Do you want the Dermatologist to perform a COMPLETE skin exam today including a clinical examination under the \"bra and underwear\" areas?  Yes  If necessary, do we have your permission to call and leave a detailed message on your Preferred Phone number that includes your specific medical information?  Yes      Allergies   Allergen Reactions    Ramipril Cough      Current Outpatient Medications:     acetaminophen (TYLENOL) 650 mg CR tablet, Take 1,300 mg by mouth every 8 (eight) hours as needed, Disp: , Rfl:     apixaban (ELIQUIS) 5 mg, Take 1 tablet (5 mg total) by mouth 2 (two) times a day, Disp: 180 tablet, Rfl: 3    Cream Base (PCCA LIPODERM BASE) CREA, PCCA Lipoderm Base cream, Disp: , Rfl:     fluticasone (FLONASE) 50 mcg/act nasal spray, fluticasone " "propionate 50 mcg/actuation nasal spray,suspension  instill 2 sprays into each nostril once daily, Disp: , Rfl:     simvastatin (ZOCOR) 20 mg tablet, TAKE 1 TABLET BY MOUTH DAILY AT  BEDTIME, Disp: 90 tablet, Rfl: 3    sotalol (BETAPACE) 80 mg tablet, TAKE 1 AND 1/2 TABLETS BY  MOUTH TWICE DAILY, Disp: 270 tablet, Rfl: 3    tamsulosin (FLOMAX) 0.4 mg, Take 0.8 mg by mouth daily with dinner, Disp: , Rfl:           Whom besides the patient is providing clinical information about today's encounter?   NO ADDITIONAL HISTORIAN (patient alone provided history)    Physical Exam and Assessment/Plan by Diagnosis:    Patient here for skin exan, has spot of concern on left nasal wall.     MELANOCYTIC NEVI (\"Moles\")    Physical Exam:  Anatomic Location Affected:   Mostly on sun-exposed areas of the trunk and extremities  Morphological Description:  Scattered, 1-4mm round to ovoid, symmetrical-appearing, even bordered, skin colored to dark brown macules/papules, mostly in sun-exposed areas  Pertinent Positives:  Pertinent Negatives:    Additional History of Present Condition:      Assessment and Plan:  Based on a thorough discussion of this condition and the management approach to it (including a comprehensive discussion of the known risks, side effects and potential benefits of treatment), the patient (family) agrees to implement the following specific plan:  When outside we recommend using a wide brim hat, sunglasses, long sleeve and pants, sunscreen with SPF 30+ with reapplication every 2 hours, or SPF specific clothing   Benign, reassured  Annual skin check     Melanocytic Nevi  Melanocytic nevi (\"moles\") are tan or brown, raised or flat areas of the skin which have an increased number of melanocytes. Melanocytes are the cells in our body which make pigment and account for skin color.    Some moles are present at birth (I.e., \"congenital nevi\"), while others come up later in life (i.e., \"acquired nevi\").  The sun can " "stimulate the body to make more moles.  Sunburns are not the only thing that triggers more moles.  Chronic sun exposure can do it too.     Clinically distinguishing a healthy mole from melanoma may be difficult, even for experienced dermatologists. The \"ABCDE's\" of moles have been suggested as a means of helping to alert a person to a suspicious mole and the possible increased risk of melanoma.  The suggestions for raising alert are as follows:    Asymmetry: Healthy moles tend to be symmetric, while melanomas are often asymmetric.  Asymmetry means if you draw a line through the mole, the two halves do not match in color, size, shape, or surface texture. Asymmetry can be a result of rapid enlargement of a mole, the development of a raised area on a previously flat lesion, scaling, ulceration, bleeding or scabbing within the mole.  Any mole that starts to demonstrate \"asymmetry\" should be examined promptly by a board certified dermatologist.     Border: Healthy moles tend to have discrete, even borders.  The border of a melanoma often blends into the normal skin and does not sharply delineate the mole from normal skin.  Any mole that starts to demonstrate \"uneven borders\" should be examined promptly by a board certified dermatologist.     Color: Healthy moles tend to be one color throughout.  Melanomas tend to be made up of different colors ranging from dark black, blue, white, or red.  Any mole that demonstrates a color change should be examined promptly by a board certified dermatologist.     Diameter: Healthy moles tend to be smaller than 0.6 cm in size; an exception are \"congenital nevi\" that can be larger.  Melanomas tend to grow and can often be greater than 0.6 cm (1/4 of an inch, or the size of a pencil eraser). This is only a guideline, and many normal moles may be larger than 0.6 cm without being unhealthy.  Any mole that starts to change in size (small to bigger or bigger to smaller) should be examined " "promptly by a board certified dermatologist.     Evolving: Healthy moles tend to \"stay the same.\"  Melanomas may often show signs of change or evolution such as a change in size, shape, color, or elevation.  Any mole that starts to itch, bleed, crust, burn, hurt, or ulcerate or demonstrate a change or evolution should be examined promptly by a board certified dermatologist.        LENTIGO    Physical Exam:  Anatomic Location Affected:  trunk, arms  Morphological Description:  Light brown macules  Pertinent Positives:  Pertinent Negatives:    Additional History of Present Condition:      Assessment and Plan:  Based on a thorough discussion of this condition and the management approach to it (including a comprehensive discussion of the known risks, side effects and potential benefits of treatment), the patient (family) agrees to implement the following specific plan:  When outside we recommend using a wide brim hat, sunglasses, long sleeve and pants, sunscreen with SPF 30+ with reapplication every 2 hours, or SPF specific clothing       What is a lentigo?  A lentigo is a pigmented flat or slightly raised lesion with a clearly defined edge. Unlike an ephelis (freckle), it does not fade in the winter months. There are several kinds of lentigo.  The name lentigo originally referred to its appearance resembling a small lentil. The plural of lentigo is lentigines, although “lentigos” is also in common use.    Who gets lentigines?  Lentigines can affect males and females of all ages and races. Solar lentigines are especially prevalent in fair skinned adults. Lentigines associated with syndromes are present at birth or arise during childhood.    What causes lentigines?  Common forms of lentigo are due to exposure to ultraviolet radiation:  Sun damage including sunburn   Indoor tanning   Phototherapy, especially photochemotherapy (PUVA)    Ionizing radiation, eg radiation therapy, can also cause lentigines.  Several familial " "syndromes associated with widespread lentigines originate from mutations in Edwin-MAP kinase, mTOR signaling and PTEN pathways.    What is the treatment for lentigines?  Most lentigines are left alone. Attempts to lighten them may not be successful. The following approaches are used:  SPF 50+ broad-spectrum sunscreen   Hydroquinone bleaching cream   Alpha hydroxy acids   Vitamin C   Retinoids   Azelaic acid   Chemical peels  Individual lesions can be permanently removed using:  Cryotherapy   Intense pulsed light   Pigment lasers    How can lentigines be prevented?  Lentigines associated with exposure ultraviolet radiation can be prevented by very careful sun protection. Clothing is more successful at preventing new lentigines than are sunscreens.    What is the outlook for lentigines?  Lentigines usually persist. They may increase in number with age and sun exposure. Some in sun-protected sites may fade and disappear.    ULLOA ANGIOMAS    Physical Exam:  Anatomic Location Affected:  trunk  Morphological Description:  Scattered cherry red, 1-4 mm papules.  Pertinent Positives:  Pertinent Negatives:    Additional History of Present Condition:      Assessment and Plan:  Based on a thorough discussion of this condition and the management approach to it (including a comprehensive discussion of the known risks, side effects and potential benefits of treatment), the patient (family) agrees to implement the following specific plan:  Monitor for changes  Benign, reassured      Assessment and Plan:    Cherry angioma, also known as Davies de Chris spots, are benign vascular skin lesions. A \"cherry angioma\" is a firm red, blue or purple papule, 0.1-1 cm in diameter. When thrombosed, they can appear black in colour until evaluated with a dermatoscope when the red or purple colour is more easily seen. Cherry angioma may develop on any part of the body but most often appear on the scalp, face, lips and trunk.  An angioma is due " "to proliferating endothelial cells; these are the cells that line the inside of a blood vessel.    Angiomas can arise in early life or later in life; the most common type of angioma is a cherry angioma.  Cherry angiomas are very common in males and females of any age or race. They are more noticeable in white skin than in skin of colour. They markedly increase in number from about the age of 40. There may be a family history of similar lesions. Eruptive cherry angiomas have been rarely reported to be associated with internal malignancy. The cause of angiomas is unknown. Genetic analysis of cherry angiomas has shown that they frequently carry specific somatic missense mutations in the GNAQ and GNA11 (Q209H) genes, which are involved in other vascular and melanocytic proliferations.      SEBORRHEIC KERATOSIS; NON-INFLAMED    Physical Exam:  Anatomic Location Affected:  trunk  Morphological Description:  Flat and raised, waxy, smooth to warty textured, yellow to brownish-grey to dark brown to blackish, discrete, \"stuck-on\" appearing papules.  Pertinent Positives:  Pertinent Negatives:    Additional History of Present Condition:      Assessment and Plan:  Based on a thorough discussion of this condition and the management approach to it (including a comprehensive discussion of the known risks, side effects and potential benefits of treatment), the patient (family) agrees to implement the following specific plan:  Monitor for changes  Benign, reassured      Seborrheic Keratosis  A seborrheic keratosis is a harmless warty spot that appears during adult life as a common sign of skin aging.  Seborrheic keratoses can arise on any area of skin, covered or uncovered, with the usual exception of the palms and soles. They do not arise from mucous membranes. Seborrheic keratoses can have highly variable appearance.      Seborrheic keratoses are extremely common. It has been estimated that over 90% of adults over the age of 60 " "years have one or more of them. They occur in males and females of all races, typically beginning to erupt in the 30s or 40s. They are uncommon under the age of 20 years.  The precise cause of seborrhoeic keratoses is not known.  Seborrhoeic keratoses are considered degenerative in nature. As time goes by, seborrheic keratoses tend to become more numerous. Some people inherit a tendency to develop a very large number of them; some people may have hundreds of them.      There is no easy way to remove multiple lesions on a single occasion.  Unless a specific lesion is \"inflamed\" and is causing pain or stinging/burning or is bleeding, most insurance companies do not authorize treatment.    XEROSIS (\"DRY SKIN\")    Physical Exam:  Anatomic Location Affected:  diffuse  Morphological Description:  xerosis  Pertinent Positives:  Pertinent Negatives:    Additional History of Present Condition:      Assessment and Plan:  Based on a thorough discussion of this condition and the management approach to it (including a comprehensive discussion of the known risks, side effects and potential benefits of treatment), the patient (family) agrees to implement the following specific plan:  Use moisturizer like Eucerin,Cerave or Aveeno Cream 3 times a day for the dry skin            Dry skin refers to skin that feels dry to touch. Dry skin has a dull surface with a rough, scaly quality. The skin is less pliable and cracked. When dryness is severe, the skin may become inflamed and fissured.  Although any body site can be dry, dry skin tends to affect the shins more than any other site.    Dry skin is lacking moisture in the outer horny cell layer (stratum corneum) and this results in cracks in the skin surface.  Dry skin is also called xerosis, xeroderma or asteatosis (lack of fat).  It can affect males and females of all ages. There is some racial variability in water and lipid content of the skin.  Dry skin that starts in early " childhood may be one of about 20 types of ichthyosis (fish-scale skin). There is often a family history of dry skin.   Dry skin is commonly seen in people with atopic dermatitis.  Nearly everyone > 60 years has dry skin.    Dry skin that begins later may be seen in people with certain diseases and conditions.  Postmenopausal women  Hypothyroidism  Chronic renal disease   Malnutrition and weight loss   Subclinical dermatitis   Treatment with certain drugs such as oral retinoids, diuretics and epidermal growth factor receptor inhibitors      What is the treatment for dry skin?  The mainstay of treatment of dry skin and ichthyosis is moisturisers/emollients. They should be applied liberally and often enough to:  Reduce itch   Improve the barrier function   Prevent entry of irritants, bacteria   Reduce transepidermal water loss.      How can dry skin be prevented?  Eliminate aggravating factors:  Reduce the frequency of bathing.   A humidifier in winter and air conditioner in summer   Compare having a short shower with a prolonged soak in a bath.   Use lukewarm, not hot, water.   Replace standard soap with a substitute such as a synthetic detergent cleanser, water-miscible emollient, bath oil, anti-pruritic tar oil, colloidal oatmeal etc.   Apply an emollient liberally and often, particularly shortly after bathing, and when itchy. The drier the skin, the thicker this should be, especially on the hands.    What is the outlook for dry skin?  A tendency to dry skin may persist life-long, or it may improve once contributing factors are controlled.     ACTINIC KERATOSIS    Physical Exam:  Anatomic Location Affected:  scalp, forehead and, left nasal wall    Morphological Description:  Scaly pink papules  Pertinent Positives:  Pertinent Negatives:      Assessment and Plan:  Based on a thorough discussion of this condition and the management approach to it (including a comprehensive discussion of the known risks, side effects  and potential benefits of treatment), the patient (family) agrees to implement the following specific plan:  When outside we recommend using a wide brim hat, sunglasses, long sleeve and pants, sunscreen with SPF 30+ with reapplication every 2 hours, or SPF specific clothing   liquid nitrogen to treat areas. Consent obtained. Expect area to blister, crust, and then fall off within 2 weeks. Please use vaseline.                                         PROCEDURE:  DESTRUCTION OF PRE-MALIGNANT LESIONS  After a thorough discussion of treatment options and risk/benefits/alternatives (including but not limited to local pain, scarring, dyspigmentation, blistering, and possible superinfection), verbal and written consent were obtained and the aforementioned lesions were treated on with cryotherapy using liquid nitrogen x 1 cycle for 5-10 seconds.    TOTAL NUMBER of 1 pre-malignant lesions were treated today on the ANATOMIC LOCATION: Left nasal wall.     The patient tolerated the procedure well, and after-care instructions were provided.     [facility] Fluorouracil/calcipotriene cream: apply topically twice a day for 5 days to scalp and forehead. This medication will cause extreme redness, inflammation, open sores. You can apply plain Vaseline 20-30 minutes after applying the cream to help with the redness, inflammation and blistering. If it is very uncomfortable, please contact Dr. Dawson and she may order topical steroids. You can also take a break and try to resume and complete the 5 day course when able to. This medication was sent to a compounding pharmacy, [email or web address].                                                                                                                                                                                                                                                                               [facility]  We use this service to prescribe medications that are often not  covered by insurance. Your physician will send your prescription to the pharmacy. You will receive an email from [email or web address] where you will follow the instructions within the email to provide your billing and shipping information. Your medicine will be shipped directly to you. If you have any questions, you can call [phone] or email [email or web address]   Patient has cream left over from last treatment, if needs more will send a message.      Scribe Attestation      I,:  Meri Fagan MA am acting as a scribe while in the presence of the attending physician.:       I,:  Camelia Dawson MD personally performed the services described in this documentation    as scribed in my presence.:

## 2024-01-18 ENCOUNTER — OFFICE VISIT (OUTPATIENT)
Dept: FAMILY MEDICINE CLINIC | Facility: CLINIC | Age: 74
End: 2024-01-18
Payer: MEDICARE

## 2024-01-18 VITALS
BODY MASS INDEX: 22.18 KG/M2 | TEMPERATURE: 96.8 F | SYSTOLIC BLOOD PRESSURE: 124 MMHG | OXYGEN SATURATION: 98 % | WEIGHT: 178.4 LBS | HEIGHT: 75 IN | HEART RATE: 56 BPM | DIASTOLIC BLOOD PRESSURE: 80 MMHG | RESPIRATION RATE: 14 BRPM

## 2024-01-18 DIAGNOSIS — R68.89 FLU-LIKE SYMPTOMS: Primary | ICD-10-CM

## 2024-01-18 DIAGNOSIS — U07.1 COVID: ICD-10-CM

## 2024-01-18 LAB
SARS-COV-2 AG UPPER RESP QL IA: POSITIVE
SL AMB POCT RAPID FLU A: NEGATIVE
SL AMB POCT RAPID FLU B: NEGATIVE
VALID CONTROL: ABNORMAL

## 2024-01-18 PROCEDURE — 87804 INFLUENZA ASSAY W/OPTIC: CPT | Performed by: STUDENT IN AN ORGANIZED HEALTH CARE EDUCATION/TRAINING PROGRAM

## 2024-01-18 PROCEDURE — 87811 SARS-COV-2 COVID19 W/OPTIC: CPT | Performed by: STUDENT IN AN ORGANIZED HEALTH CARE EDUCATION/TRAINING PROGRAM

## 2024-01-18 PROCEDURE — 99214 OFFICE O/P EST MOD 30 MIN: CPT | Performed by: STUDENT IN AN ORGANIZED HEALTH CARE EDUCATION/TRAINING PROGRAM

## 2024-01-18 RX ORDER — NIRMATRELVIR AND RITONAVIR 300-100 MG
3 KIT ORAL 2 TIMES DAILY
Qty: 30 TABLET | Refills: 0 | Status: SHIPPED | OUTPATIENT
Start: 2024-01-18 | End: 2024-01-23

## 2024-01-18 NOTE — PROGRESS NOTES
Assessment/Plan:         Problem List Items Addressed This Visit    None  Visit Diagnoses     Flu-like symptoms    -  Primary    Relevant Orders    POCT Rapid Covid Ag (Completed)    POCT rapid flu A and B (Completed)    COVID        Relevant Medications    nirmatrelvir & ritonavir (Paxlovid, 300/100,) tablet therapy pack        Take half a dose of eliquis for the next 10 days, do not take simvastatin for the next 7 days    Subjective:      Patient ID: Benjy Mae is a 73 y.o. male.    HPI    3 weeks iof cold like symptoms. 2-3 days ago started with worsening cough, sore throat and nasal congestion.     Tested forc ovid 3 weeks ago and negative.    The following portions of the patient's history were reviewed and updated as appropriate:   Past Medical History:  He has a past medical history of Atrial fibrillation (HCC), Benign hypertension, Blood coagulation disorder (HCC), Chronic bilateral low back pain without sciatica (04/24/2018), Colon polyp, Hip arthritis (01/22/2019), Hypertriglyceridemia, Lipid metabolism disorder, Neurogenic bladder, Other postprocedural cardiac functional disturbances following cardiac surgery, Preop examination (01/22/2019), and Squamous cell skin cancer.,  _______________________________________________________________________  Medical Problems:  does not have any pertinent problems on file.,  _______________________________________________________________________  Past Surgical History:   has a past surgical history that includes Knee surgery (Bilateral); Lipoma resection; Hip surgery (Right, 02/2019); Hernia repair; and Colonoscopy.,  _______________________________________________________________________  Family History:  family history includes Colon cancer in his mother; Diabetes in his mother; Lung cancer in his father.,  _______________________________________________________________________  Social History:   reports that he has quit smoking. He has never used smokeless  [employer] tobacco. He reports current alcohol use of about 1.0 standard drink of alcohol per week. He reports that he does not use drugs.,  _______________________________________________________________________  Allergies:  is allergic to ramipril..  _______________________________________________________________________  Current Outpatient Medications   Medication Sig Dispense Refill   • apixaban (ELIQUIS) 5 mg Take 1 tablet (5 mg total) by mouth 2 (two) times a day 180 tablet 3   • fluticasone (FLONASE) 50 mcg/act nasal spray fluticasone propionate 50 mcg/actuation nasal spray,suspension   instill 2 sprays into each nostril once daily     • nirmatrelvir & ritonavir (Paxlovid, 300/100,) tablet therapy pack Take 3 tablets by mouth 2 (two) times a day for 5 days Take 2 nirmatrelvir tablets + 1 ritonavir tablet together per dose 30 tablet 0   • simvastatin (ZOCOR) 20 mg tablet TAKE 1 TABLET BY MOUTH DAILY AT  BEDTIME 90 tablet 3   • sotalol (BETAPACE) 80 mg tablet TAKE 1 AND 1/2 TABLETS BY  MOUTH TWICE DAILY 270 tablet 3   • tamsulosin (FLOMAX) 0.4 mg Take 0.8 mg by mouth daily with dinner     • acetaminophen (TYLENOL) 650 mg CR tablet Take 1,300 mg by mouth every 8 (eight) hours as needed     • Cream Base (PCCA LIPODERM BASE) CREA PCCA Lipoderm Base cream       No current facility-administered medications for this visit.     _______________________________________________________________________  Review of Systems   Constitutional:  Positive for fatigue. Negative for activity change, appetite change and fever.   HENT:  Positive for congestion, postnasal drip, rhinorrhea, sinus pressure, sinus pain and sore throat.    Eyes:  Negative for visual disturbance.   Respiratory:  Positive for cough. Negative for shortness of breath.    Cardiovascular:  Negative for chest pain.   Gastrointestinal:  Negative for nausea and vomiting.         Objective:  Vitals:    01/18/24 1055   BP: 124/80   Pulse: 56   Resp: 14   Temp: (!) 96.8 °F (36  "°C)   SpO2: 98%   Weight: 80.9 kg (178 lb 6.4 oz)   Height: 6' 3\" (1.905 m)     Body mass index is 22.3 kg/m².     Physical Exam  Constitutional:       General: He is not in acute distress.     Appearance: Normal appearance. He is well-developed. He is ill-appearing.   HENT:      Head: Normocephalic and atraumatic.   Pulmonary:      Effort: Pulmonary effort is normal. No respiratory distress.      Breath sounds: Transmitted upper airway sounds present.   Neurological:      Mental Status: He is alert and oriented to person, place, and time. Mental status is at baseline.   Psychiatric:         Mood and Affect: Mood normal.         Behavior: Behavior normal.         "

## 2024-02-04 DIAGNOSIS — I48.0 PAF (PAROXYSMAL ATRIAL FIBRILLATION) (HCC): ICD-10-CM

## 2024-02-05 ENCOUNTER — TELEPHONE (OUTPATIENT)
Dept: CARDIOLOGY CLINIC | Facility: CLINIC | Age: 74
End: 2024-02-05

## 2024-02-05 RX ORDER — SOTALOL HYDROCHLORIDE 80 MG/1
TABLET ORAL
Qty: 270 TABLET | Refills: 3 | Status: SHIPPED | OUTPATIENT
Start: 2024-02-05

## 2024-02-05 NOTE — TELEPHONE ENCOUNTER
Patient Galdino scheduled to see Dr. Harper on 2/26/2024 @ 11:20 AM.    Patient would also like to see Dr. Palma.    Dr. Harper can you reach out to this EP provider.

## 2024-02-05 NOTE — TELEPHONE ENCOUNTER
Pt called & stated that his apple has been telling for the past couple of days that he is in Afib.       Call transferred to [facility]

## 2024-02-05 NOTE — TELEPHONE ENCOUNTER
I will not be able to see this patient this week as I am working in the hospital.    Dr. Rodas is no longer at [facility]    His replacement Dr. Palma his electrophysiologist at [facility].    If patient is agreeable, I can call him and see if patient can be seen by him.  Please let me know.

## 2024-02-05 NOTE — TELEPHONE ENCOUNTER
Spoke with pt and he stated that according to his apple watch he is experiencing A-Fib intermittently on and off which started since about 2 weeks ago. Pt also stated that he is experiencing intermittent SOB on exertion. Pt denied any other cardiac related symptoms.    Pt's Heart rate today is 68    Pt last saw Dr. Rodas at [facility] for EP and said that he sees Dr. ISLAS as well but as his general cardiac practitioner.    Pt was last seen by Dr. ISLAS on 11/16/2022 and was told to come back in a year and said that no one contacted him.     Pt only wants to see Dr. ISLAS, please schedule pt accordingly.    Thanks!

## 2024-02-05 NOTE — TELEPHONE ENCOUNTER
I reached out to MAGGIE Shea at [facility].  Their office should be calling him to schedule an appointment.

## 2024-02-26 ENCOUNTER — OFFICE VISIT (OUTPATIENT)
Dept: CARDIOLOGY CLINIC | Facility: CLINIC | Age: 74
End: 2024-02-26
Payer: MEDICARE

## 2024-02-26 VITALS
DIASTOLIC BLOOD PRESSURE: 80 MMHG | HEIGHT: 76 IN | SYSTOLIC BLOOD PRESSURE: 110 MMHG | RESPIRATION RATE: 16 BRPM | BODY MASS INDEX: 21.55 KG/M2 | OXYGEN SATURATION: 99 % | HEART RATE: 66 BPM | WEIGHT: 177 LBS

## 2024-02-26 DIAGNOSIS — I48.0 PAF (PAROXYSMAL ATRIAL FIBRILLATION) (HCC): Primary | ICD-10-CM

## 2024-02-26 DIAGNOSIS — I71.20 THORACIC AORTIC ANEURYSM WITHOUT RUPTURE, UNSPECIFIED PART (HCC): ICD-10-CM

## 2024-02-26 DIAGNOSIS — I42.9 CARDIOMYOPATHY, UNSPECIFIED TYPE (HCC): ICD-10-CM

## 2024-02-26 PROCEDURE — 93000 ELECTROCARDIOGRAM COMPLETE: CPT | Performed by: INTERNAL MEDICINE

## 2024-02-26 PROCEDURE — 99214 OFFICE O/P EST MOD 30 MIN: CPT | Performed by: INTERNAL MEDICINE

## 2024-02-26 NOTE — PROGRESS NOTES
[facility] [place]  [address]  [place] [address]  Cardiology Follow Up    Benjy Mae  1950  [id number]      1. PAF (paroxysmal atrial fibrillation) (HCC)  POCT ECG    AMB event recorder      2. Thoracic aortic aneurysm without rupture, unspecified part (HCC)        3. Cardiomyopathy, unspecified type (HCC)            Chief Complaint   Patient presents with    Follow-up       Interval History: Patient presents for follow-up visit.  Patient denies any history of chest pain shortness of breath.  Patient denies any history of leg edema or orthopnea PND.  No history of presyncope syncope.  Patient states compliance with the present list of medications.  Patient had episodes of palpitations likely consistent with more frequent episode of A-fib.  Patient did have COVID in January and was treated with Paxlovid.  Patient also seen by electrophysiologist Dr. Palma at [facility].  He was offered options of cardioversion versus ablation.  It appears now that the patient is in sinus rhythm presently.    He denies any bleeding issues.    Patient Active Problem List   Diagnosis    Cardiomyopathy (HCC)    Mixed hyperlipidemia    Essential hypertension    PAF (paroxysmal atrial fibrillation) (HCC)    Neurogenic bladder    Current use of long term anticoagulation    History of total hip arthroplasty    Thoracic aortic aneurysm without rupture (HCC)    History of skin cancer    Lung nodule < 6cm on CT    History of tobacco use    Family history of colon cancer in mother     Past Medical History:   Diagnosis Date    Atrial fibrillation (HCC)     Benign hypertension     Blood coagulation disorder (HCC)     Chronic bilateral low back pain without sciatica 04/24/2018    Colon polyp     Hip arthritis 01/22/2019    Hypertriglyceridemia     Lipid metabolism disorder     Neurogenic bladder     Other postprocedural cardiac functional disturbances following cardiac surgery      Preop examination 01/22/2019    Squamous cell skin cancer     scalp     Social History     Socioeconomic History    Marital status: /Civil Union     Spouse name: Not on file    Number of children: Not on file    Years of education: Not on file    Highest education level: Not on file   Occupational History     Comment: employed   Tobacco Use    Smoking status: Former    Smokeless tobacco: Never    Tobacco comments:     quite in 1972   Vaping Use    Vaping status: Never Used   Substance and Sexual Activity    Alcohol use: Yes     Alcohol/week: 1.0 standard drink of alcohol     Types: 1 Glasses of wine per week     Comment: each night    Drug use: No    Sexual activity: Not on file   Other Topics Concern    Not on file   Social History Narrative    Always uses a seatbelt    Daily caffeine consumption, 2-3 servings per day    Seeing a dentist     Social Determinants of Health     Financial Resource Strain: Low Risk  (11/8/2023)    Overall Financial Resource Strain (CARDIA)     Difficulty of Paying Living Expenses: Not hard at all   Food Insecurity: Not on file   Transportation Needs: No Transportation Needs (11/8/2023)    PRAPARE - Transportation     Lack of Transportation (Medical): No     Lack of Transportation (Non-Medical): No   Physical Activity: Not on file   Stress: Not on file   Social Connections: Not on file   Intimate Partner Violence: Not on file   Housing Stability: Not on file      Family History   Problem Relation Age of Onset    Diabetes Mother     Colon cancer Mother     Lung cancer Father      Past Surgical History:   Procedure Laterality Date    COLONOSCOPY      HERNIA REPAIR      HIP SURGERY Right 02/2019    KNEE SURGERY Bilateral     LIPOMA RESECTION         Current Outpatient Medications:     acetaminophen (TYLENOL) 650 mg CR tablet, Take 1,300 mg by mouth every 8 (eight) hours as needed, Disp: , Rfl:     apixaban (ELIQUIS) 5 mg, Take 1 tablet (5 mg total) by mouth 2 (two) times a day,  "Disp: 180 tablet, Rfl: 3    Cream Base (PCCA LIPODERM BASE) CREA, PCCA Lipoderm Base cream, Disp: , Rfl:     fluticasone (FLONASE) 50 mcg/act nasal spray, fluticasone propionate 50 mcg/actuation nasal spray,suspension  instill 2 sprays into each nostril once daily, Disp: , Rfl:     simvastatin (ZOCOR) 20 mg tablet, TAKE 1 TABLET BY MOUTH DAILY AT  BEDTIME, Disp: 90 tablet, Rfl: 3    sotalol (BETAPACE) 80 mg tablet, TAKE 1 AND 1/2 TABLETS BY MOUTH  TWICE DAILY, Disp: 270 tablet, Rfl: 3    tamsulosin (FLOMAX) 0.4 mg, Take 0.8 mg by mouth daily with dinner, Disp: , Rfl:   Allergies   Allergen Reactions    Ramipril Cough       Labs:  Office Visit on 01/18/2024   Component Date Value    POCT SARS-CoV-2 Ag 01/18/2024 Positive (A)     VALID CONTROL 01/18/2024 Valid     RAPID FLU A 01/18/2024 negative     RAPID FLU B 01/18/2024 negative      Imaging: No results found.    Review of Systems:  Review of Systems  REVIEW OF SYSTEMS:  Constitutional:  Denies fever or chills   Eyes:  Denies change in visual acuity   HENT:  Denies nasal congestion or sore throat   Respiratory:  Denies cough or shortness of breath   Cardiovascular:  Denies chest pain or edema   GI:  Denies abdominal pain, nausea, vomiting, bloody stools or diarrhea   :  Denies dysuria, frequency, difficulty in micturition and nocturia  Musculoskeletal:  Denies back pain or joint pain   Neurologic:  Denies headache, focal weakness or sensory changes   Endocrine:  Denies polyuria or polydipsia   Lymphatic:  Denies swollen glands   Psychiatric:  Denies depression or anxiety    Physical Exam:    /80 (BP Location: Left arm, Patient Position: Sitting, Cuff Size: Standard)   Pulse 66   Resp 16   Ht 6' 3.5\" (1.918 m)   Wt 80.3 kg (177 lb)   SpO2 99%   BMI 21.83 kg/m²     Physical Exam  PHYSICAL EXAM:  General:  Patient is not in acute distress   Head: Normocephalic, Atraumatic.  HEENT:  Both pupils normal-size atraumatic, normocephalic, nonicteric  Neck:  JVP " not raised. Trachea central. No carotid bruit  Respiratory:  normal breath sounds no crackles. no rhonchi  Cardiovascular:  Regular rate and rhythm no S3.  Patient was skipped beats no murmurs  GI:  Abdomen soft nontender. No organomegaly.   Lymphatic:  No cervical or inguinal lymphadenopathy  Neurologic:  Patient is awake alert, oriented . Grossly nonfocal  Extremities no edema    EKG shows sinus rhythm with PACs      Discussion/Summary:    Patient with multiple medical problems who seems to be doing reasonably well from cardiac standpoint. Previous studies reviewed with patient. Medications reviewed and possible side effects discussed. concepts of cardiovascular disease , signs and symptoms of heart disease. Dietary and risk factor modification reinforced. All questions answered.  Safety measures reviewed. Patient advised to report any problems prompting medical attention.    Patient with known history of paroxysmal atrial fibrillation with more frequent episodes of palpitations recently.  Patient did have A-fib ablation approximately 4 years ago.  It appears that patient may be back in sinus rhythm.  Patient is on sotalol for A-fib prophylaxis.    Patient will be scheduled for cardiac event monitor for 1 week to assess A-fib burden.    Risks and benefits  and alternatives of anticoagulation to prevent thromboembolic risk from atrial fibrillation discussed at length.  Patient to report any bleeding issues.    Follow-up in 6 months or earlier as needed.

## 2024-03-13 ENCOUNTER — CLINICAL SUPPORT (OUTPATIENT)
Dept: CARDIOLOGY CLINIC | Facility: CLINIC | Age: 74
End: 2024-03-13
Payer: MEDICARE

## 2024-03-13 DIAGNOSIS — I48.0 PAF (PAROXYSMAL ATRIAL FIBRILLATION) (HCC): ICD-10-CM

## 2024-03-13 PROCEDURE — 93244 EXT ECG>48HR<7D REV&INTERPJ: CPT | Performed by: INTERNAL MEDICINE

## 2024-07-30 ENCOUNTER — TELEPHONE (OUTPATIENT)
Dept: CARDIOLOGY CLINIC | Facility: CLINIC | Age: 74
End: 2024-07-30

## 2024-07-30 DIAGNOSIS — I48.0 PAF (PAROXYSMAL ATRIAL FIBRILLATION) (HCC): Primary | ICD-10-CM

## 2024-07-30 DIAGNOSIS — E78.2 MIXED HYPERLIPIDEMIA: ICD-10-CM

## 2024-07-30 NOTE — TELEPHONE ENCOUNTER
Caller: Galdino    Doctor: Dr. ISLAS    Reason for call: Pt requesting if Dr. ISLAS can order labs before his next apt on October 31. If labs are ordered pt would like them mailed to his house [facility]    Call back#: [phone]

## 2024-07-30 NOTE — TELEPHONE ENCOUNTER
Order for CBC CMP and lipid panel in the chart.  Please mail the lab order slip to the patient.  He should get this done in September before his appointment in October.  Thank you.

## 2024-09-02 DIAGNOSIS — E78.2 MIXED HYPERLIPIDEMIA: ICD-10-CM

## 2024-09-03 RX ORDER — SIMVASTATIN 20 MG
TABLET ORAL
Qty: 90 TABLET | Refills: 0 | Status: SHIPPED | OUTPATIENT
Start: 2024-09-03

## 2024-09-26 DIAGNOSIS — E78.2 MIXED HYPERLIPIDEMIA: ICD-10-CM

## 2024-09-27 RX ORDER — SIMVASTATIN 20 MG
TABLET ORAL
Qty: 90 TABLET | Refills: 1 | Status: SHIPPED | OUTPATIENT
Start: 2024-09-27

## 2024-10-17 ENCOUNTER — TELEPHONE (OUTPATIENT)
Dept: CARDIOLOGY CLINIC | Facility: CLINIC | Age: 74
End: 2024-10-17

## 2024-10-17 NOTE — TELEPHONE ENCOUNTER
Called pt and left vm relaying Dr. Harper's message. Provided call back number for any questions/concerns.    ----- Message from Larissa Harper MD sent at 10/17/2024 11:12 AM EDT -----  Please call the patient.  Blood work overall good.  Has an appointment later this month.

## 2024-10-31 ENCOUNTER — OFFICE VISIT (OUTPATIENT)
Dept: CARDIOLOGY CLINIC | Facility: CLINIC | Age: 74
End: 2024-10-31
Payer: MEDICARE

## 2024-10-31 VITALS
BODY MASS INDEX: 22.01 KG/M2 | SYSTOLIC BLOOD PRESSURE: 104 MMHG | OXYGEN SATURATION: 98 % | DIASTOLIC BLOOD PRESSURE: 76 MMHG | WEIGHT: 177 LBS | HEIGHT: 75 IN | RESPIRATION RATE: 16 BRPM | HEART RATE: 73 BPM

## 2024-10-31 DIAGNOSIS — I48.0 PAF (PAROXYSMAL ATRIAL FIBRILLATION) (HCC): Primary | ICD-10-CM

## 2024-10-31 DIAGNOSIS — E78.2 MIXED HYPERLIPIDEMIA: ICD-10-CM

## 2024-10-31 DIAGNOSIS — Z79.01 CHRONIC ANTICOAGULATION: ICD-10-CM

## 2024-10-31 PROCEDURE — 99213 OFFICE O/P EST LOW 20 MIN: CPT | Performed by: INTERNAL MEDICINE

## 2024-10-31 NOTE — ASSESSMENT & PLAN NOTE
Continue simvastatin.  Lipid profile acceptable.  Recent blood work reports reviewed with the patient.    Symptoms to watch her from cardiac standpoint which would indicate the need for further cardiac evaluation discussed.    Follow-up in 6 months or earlier as needed.  Patient is agreeable with the plan of care.

## 2024-10-31 NOTE — PROGRESS NOTES
[facility]  [address] [place] [address]  Cardiology Follow Up    Benjy Mae  1950  [id number]      Assessment & Plan  PAF (paroxysmal atrial fibrillation) (HCC)  Patient has history of ablation and has maintained sinus rhythm.  Patient is on sotalol therapy.  Patient had an EKG done through his electrophysiologist a few months ago.  Chronic anticoagulation  Risks and benefits  and alternatives of anticoagulation to prevent thromboembolic risk from atrial fibrillation discussed at length.  Patient to report any bleeding issues.  Mixed hyperlipidemia  Continue simvastatin.  Lipid profile acceptable.  Recent blood work reports reviewed with the patient.    Symptoms to watch her from cardiac standpoint which would indicate the need for further cardiac evaluation discussed.    Follow-up in 6 months or earlier as needed.  Patient is agreeable with the plan of care.       Chief Complaint   Patient presents with    Follow-up       Interval History:   Patient presents for follow-up visit.  Patient denies any history of chest pain shortness of breath.  Patient denies any history of leg edema or orthopnea PND.  No history of presyncope syncope.  Patient states compliance with the present list of medications.  No recurrence of palpitations.  No bleeding issues.    Patient Active Problem List   Diagnosis    Cardiomyopathy (HCC)    Mixed hyperlipidemia    Essential hypertension    PAF (paroxysmal atrial fibrillation) (HCC)    Neurogenic bladder    Current use of long term anticoagulation    History of total hip arthroplasty    Thoracic aortic aneurysm without rupture (HCC)    History of skin cancer    Lung nodule < 6cm on CT    History of tobacco use    Family history of colon cancer in mother     Past Medical History:   Diagnosis Date    Atrial fibrillation (HCC)     Benign hypertension     Blood coagulation disorder (HCC)     Chronic bilateral low back pain without sciatica  04/24/2018    Colon polyp     Hip arthritis 01/22/2019    Hypertriglyceridemia     Lipid metabolism disorder     Neurogenic bladder     Other postprocedural cardiac functional disturbances following cardiac surgery     Preop examination 01/22/2019    Squamous cell skin cancer     scalp     Social History     Socioeconomic History    Marital status: /Civil Union     Spouse name: Not on file    Number of children: Not on file    Years of education: Not on file    Highest education level: Not on file   Occupational History     Comment: employed   Tobacco Use    Smoking status: Former    Smokeless tobacco: Never    Tobacco comments:     quite in 1972   Vaping Use    Vaping status: Never Used   Substance and Sexual Activity    Alcohol use: Yes     Alcohol/week: 1.0 standard drink of alcohol     Types: 1 Glasses of wine per week     Comment: each night    Drug use: No    Sexual activity: Not on file   Other Topics Concern    Not on file   Social History Narrative    Always uses a seatbelt    Daily caffeine consumption, 2-3 servings per day    Seeing a dentist     Social Determinants of Health     Financial Resource Strain: Low Risk  (11/8/2023)    Overall Financial Resource Strain (CARDIA)     Difficulty of Paying Living Expenses: Not hard at all   Food Insecurity: Not on file   Transportation Needs: No Transportation Needs (11/8/2023)    PRAPARE - Transportation     Lack of Transportation (Medical): No     Lack of Transportation (Non-Medical): No   Physical Activity: Not on file   Stress: Not on file   Social Connections: Not on file   Intimate Partner Violence: Not on file   Housing Stability: Not on file      Family History   Problem Relation Age of Onset    Diabetes Mother     Colon cancer Mother     Lung cancer Father      Past Surgical History:   Procedure Laterality Date    COLONOSCOPY      HERNIA REPAIR      HIP SURGERY Right 02/2019    KNEE SURGERY Bilateral     LIPOMA RESECTION         Current Outpatient  Medications:     acetaminophen (TYLENOL) 650 mg CR tablet, Take 1,300 mg by mouth every 8 (eight) hours as needed, Disp: , Rfl:     apixaban (ELIQUIS) 5 mg, Take 1 tablet (5 mg total) by mouth 2 (two) times a day, Disp: 180 tablet, Rfl: 3    Cream Base (PCCA LIPODERM BASE) CREA, PCCA Lipoderm Base cream, Disp: , Rfl:     fluticasone (FLONASE) 50 mcg/act nasal spray, fluticasone propionate 50 mcg/actuation nasal spray,suspension  instill 2 sprays into each nostril once daily, Disp: , Rfl:     simvastatin (ZOCOR) 20 mg tablet, TAKE 1 TABLET BY MOUTH DAILY AT  BEDTIME, Disp: 90 tablet, Rfl: 1    sotalol (BETAPACE) 80 mg tablet, TAKE 1 AND 1/2 TABLETS BY MOUTH  TWICE DAILY, Disp: 270 tablet, Rfl: 3    tamsulosin (FLOMAX) 0.4 mg, Take 0.8 mg by mouth daily with dinner, Disp: , Rfl:   Allergies   Allergen Reactions    Ramipril Cough       Labs:  No visits with results within 2 Month(s) from this visit.   Latest known visit with results is:   Office Visit on 01/18/2024   Component Date Value    POCT SARS-CoV-2 Ag 01/18/2024 Positive (A)     VALID CONTROL 01/18/2024 Valid     RAPID FLU A 01/18/2024 negative     RAPID FLU B 01/18/2024 negative      Imaging: No results found.    Review of Systems:  Review of Systems  REVIEW OF SYSTEMS:  Constitutional:  Denies fever or chills   Eyes:  Denies change in visual acuity   HENT:  Denies nasal congestion or sore throat   Respiratory:  Denies cough or shortness of breath   Cardiovascular:  Denies chest pain or edema   GI:  Denies abdominal pain, nausea, vomiting, bloody stools or diarrhea   :  Denies dysuria, frequency, difficulty in micturition and nocturia  Musculoskeletal:  Denies back pain or joint pain   Neurologic:  Denies headache, focal weakness or sensory changes   Endocrine:  Denies polyuria or polydipsia   Lymphatic:  Denies swollen glands   Psychiatric:  Denies depression or anxiety    Physical Exam:    /76 (BP Location: Left arm, Patient Position: Sitting, Cuff  "Size: Standard)   Pulse 73   Resp 16   Ht 6' 3\" (1.905 m)   Wt 80.3 kg (177 lb)   SpO2 98%   BMI 22.12 kg/m²     Physical Exam  PHYSICAL EXAM:  General:  Patient is not in acute distress   Head: Normocephalic, Atraumatic.  HEENT:  Both pupils normal-size atraumatic, normocephalic, nonicteric  Neck:  JVP not raised. Trachea central. No carotid bruit  Respiratory:  normal breath sounds no crackles. no rhonchi  Cardiovascular:  Regular rate and rhythm no S3 no murmurs  GI:  Abdomen soft nontender. No organomegaly.   Lymphatic:  No cervical or inguinal lymphadenopathy  Neurologic:  Patient is awake alert, oriented . Grossly nonfocal  Extremities no edema      "

## 2024-10-31 NOTE — ASSESSMENT & PLAN NOTE
Patient has history of ablation and has maintained sinus rhythm.  Patient is on sotalol therapy.  Patient had an EKG done through his electrophysiologist a few months ago.

## 2024-11-18 ENCOUNTER — OFFICE VISIT (OUTPATIENT)
Dept: FAMILY MEDICINE CLINIC | Facility: CLINIC | Age: 74
End: 2024-11-18
Payer: MEDICARE

## 2024-11-18 VITALS
HEART RATE: 72 BPM | DIASTOLIC BLOOD PRESSURE: 66 MMHG | WEIGHT: 179 LBS | HEIGHT: 75 IN | OXYGEN SATURATION: 99 % | RESPIRATION RATE: 18 BRPM | SYSTOLIC BLOOD PRESSURE: 110 MMHG | BODY MASS INDEX: 22.26 KG/M2

## 2024-11-18 DIAGNOSIS — Z12.5 SCREENING FOR MALIGNANT NEOPLASM OF PROSTATE: ICD-10-CM

## 2024-11-18 DIAGNOSIS — R20.9 COLD HANDS AND FEET: ICD-10-CM

## 2024-11-18 DIAGNOSIS — R09.89 CHRONIC SINUS COMPLAINTS: ICD-10-CM

## 2024-11-18 DIAGNOSIS — I10 ESSENTIAL HYPERTENSION: ICD-10-CM

## 2024-11-18 DIAGNOSIS — Z23 ENCOUNTER FOR IMMUNIZATION: Primary | ICD-10-CM

## 2024-11-18 DIAGNOSIS — Z79.01 CURRENT USE OF LONG TERM ANTICOAGULATION: ICD-10-CM

## 2024-11-18 DIAGNOSIS — E78.2 MIXED HYPERLIPIDEMIA: ICD-10-CM

## 2024-11-18 PROCEDURE — 99214 OFFICE O/P EST MOD 30 MIN: CPT | Performed by: STUDENT IN AN ORGANIZED HEALTH CARE EDUCATION/TRAINING PROGRAM

## 2024-11-18 PROCEDURE — G0008 ADMIN INFLUENZA VIRUS VAC: HCPCS

## 2024-11-18 PROCEDURE — G0439 PPPS, SUBSEQ VISIT: HCPCS | Performed by: STUDENT IN AN ORGANIZED HEALTH CARE EDUCATION/TRAINING PROGRAM

## 2024-11-18 PROCEDURE — 90662 IIV NO PRSV INCREASED AG IM: CPT

## 2024-11-18 RX ORDER — GINSENG 100 MG
50 CAPSULE ORAL DAILY
COMMUNITY

## 2024-11-18 RX ORDER — FLUTICASONE PROPIONATE 50 MCG
2 SPRAY, SUSPENSION (ML) NASAL DAILY
Qty: 9.9 ML | Refills: 1 | Status: SHIPPED | OUTPATIENT
Start: 2024-11-18

## 2024-11-18 RX ORDER — CHLORAL HYDRATE 500 MG
2 CAPSULE ORAL DAILY
COMMUNITY

## 2024-11-18 NOTE — ASSESSMENT & PLAN NOTE
Orders:    Comprehensive metabolic panel; Future    CBC (Includes Diff/Plt) (Refl); Future    TSH + Free T4; Future    Lipid panel; Future

## 2024-11-18 NOTE — PROGRESS NOTES
Name: Benjy Mae      : 1950      MRN: [id number]  Encounter Provider: Ricardo Torres MD  Encounter Date: 2024   Encounter department: [facility] [address]    Assessment & Plan  Encounter for immunization    Orders:    influenza vaccine, high-dose, PF 0.5 mL (Fluzone High Dose)    Chronic sinus complaints    Orders:    fluticasone (FLONASE) 50 mcg/act nasal spray; 2 sprays into each nostril daily    Ambulatory Referral to Otolaryngology; Future    Screening for malignant neoplasm of prostate         Essential hypertension    Orders:    Comprehensive metabolic panel; Future    CBC (Includes Diff/Plt) (Refl); Future    TSH + Free T4; Future    Lipid panel; Future    Mixed hyperlipidemia    Orders:    Lipid panel; Future    Current use of long term anticoagulation    Orders:    CBC (Includes Diff/Plt) (Refl); Future    Cold hands and feet    Orders:    VAS ANALISA and waveform analysis, multiple levels; Future       Preventive health issues were discussed with patient, and age appropriate screening tests were ordered as noted in patient's After Visit Summary. Personalized health advice and appropriate referrals for health education or preventive services given if needed, as noted in patient's After Visit Summary.    History of Present Illness     Some circulatory cocnerns.  Hands and feet may be cold at night.  States he exercises vigorously quite often with no issues    ENT: was going to Dr Curran, who unfortunately is no longer practicing.  Would like to know if I can refill his Flonase and/or have a referral for new ENT.  Has been doing this for years and overall has been stable    Awv    Mental health has been okay states he has bouts of ups and downs and is worried also his mother may likely pass away in the near future.  Had a twin brother who passed away from suicide as well    PSA was 2.4 in [place] at his urologist. Still doing intermittent catherters 2-3x a  day       Patient Care Team:  Ricardo Torres MD as PCP - General (Family Medicine)  Larissa Harper MD    Review of Systems  Medical History Reviewed by provider this encounter:       Annual Wellness Visit Questionnaire   Benjy is here for his Subsequent Wellness visit.     Health Risk Assessment:   Patient rates overall health as very good. Patient feels that their physical health rating is same. Patient is very satisfied with their life. Eyesight was rated as same. Hearing was rated as same. Patient feels that their emotional and mental health rating is slightly worse. Patients states they are never, rarely angry. Patient states they are sometimes unusually tired/fatigued. Pain experienced in the last 7 days has been some. Patient's pain rating has been 5/10. Patient states that he has experienced no weight loss or gain in last 6 months. Mental health is good but some extenuating circumstances can bring on slight depression / sleepless times in middle of night.    Depression Screening:   PHQ-2 Score: 0      Fall Risk Screening:   In the past year, patient has experienced: no history of falling in past year      Home Safety:  Patient does not have trouble with stairs inside or outside of their home. Patient has working smoke alarms and has working carbon monoxide detector. Home safety hazards include: none.     Nutrition:   Current diet is Regular.     Medications:   Patient is currently taking over-the-counter supplements. OTC medications include: see medication list. Patient is able to manage medications.     Activities of Daily Living (ADLs)/Instrumental Activities of Daily Living (IADLs):   Walk and transfer into and out of bed and chair?: Yes  Dress and groom yourself?: Yes    Bathe or shower yourself?: Yes    Feed yourself? Yes  Do your laundry/housekeeping?: Yes  Manage your money, pay your bills and track your expenses?: Yes  Make your own meals?: Yes    Do your own shopping?: Yes    Durable Medical  Equipment Suppliers  Intermittent catheters (2x - 3x per day)    Previous Hospitalizations:   Any hospitalizations or ED visits within the last 12 months?: No      Advance Care Planning:   Living will: Yes    Durable POA for healthcare: Yes    Advanced directive: Yes      PREVENTIVE SCREENINGS      Cardiovascular Screening:    General: Screening Not Indicated and History Lipid Disorder      Diabetes Screening:     General: Screening Current      Colorectal Cancer Screening:     General: Screening Current      Abdominal Aortic Aneurysm (AAA) Screening:    Risk factors include: age between 65-76 yo and tobacco use        Lung Cancer Screening:     General: Screening Not Indicated      Hepatitis C Screening:    General: Screening Current    Screening, Brief Intervention, and Referral to Treatment (SBIRT)    Screening  Typical number of drinks in a day: 1  Typical number of drinks in a week: 7  Interpretation: Low risk drinking behavior.    AUDIT-C Screenin) How often did you have a drink containing alcohol in the past year? 4 or more times a week  2) How many drinks did you have on a typical day when you were drinking in the past year? 1 to 2  3) How often did you have 6 or more drinks on one occasion in the past year? never    AUDIT-C Score: 4  Interpretation: Score 4-12 (male): POSITIVE screen for alcohol misuse    AUDIT Screenin) How often during the last year have you found that you were not able to stop drinking once you had started? 0 - never  5) How often during the last year have you failed to do what was normally expected from you because of drinking? 0 - never  6) How often during the last year have you needed a first drink in the morning to get yourself going after a heavy drinking session? 0 - never  7) How often during the last year have you had a feeling of guilt or remorse after drinking? 0 - never  8) How often during the last year have you been unable to remember what happened the night  before because you had been drinking? 0 - never  9) Have you or someone else been injured as a result of your drinking? 0 - no  10) Has a relative or friend or a doctor or another health worker been concerned about your drinking or suggested you cut down? 0 - no    AUDIT Score: 4  Interpretation: Low risk alcohol consumption    Single Item Drug Screening:  How often have you used an illegal drug (including marijuana) or a prescription medication for non-medical reasons in the past year? never    Single Item Drug Screen Score: 0  Interpretation: Negative screen for possible drug use disorder    Social Drivers of Health     Financial Resource Strain: Low Risk  (11/8/2023)    Overall Financial Resource Strain (CARDIA)     Difficulty of Paying Living Expenses: Not hard at all   Food Insecurity: No Food Insecurity (11/17/2024)    Hunger Vital Sign     Worried About Running Out of Food in the Last Year: Never true     Ran Out of Food in the Last Year: Never true   Transportation Needs: No Transportation Needs (11/17/2024)    PRAPARE - Transportation     Lack of Transportation (Medical): No     Lack of Transportation (Non-Medical): No   Housing Stability: Low Risk  (11/17/2024)    Housing Stability Vital Sign     Unable to Pay for Housing in the Last Year: No     Number of Times Moved in the Last Year: 0     Homeless in the Last Year: No   Utilities: Not At Risk (11/17/2024)    [facility] Utilities     Threatened with loss of utilities: No     No results found.    Objective   There were no vitals taken for this visit.    Physical Exam  Constitutional:       General: He is not in acute distress.     Appearance: Normal appearance. He is not ill-appearing.   HENT:      Head: Normocephalic and atraumatic.      Right Ear: Tympanic membrane, ear canal and external ear normal.      Left Ear: Tympanic membrane, ear canal and external ear normal.      Nose: Nose normal.      Mouth/Throat:      Mouth: Mucous membranes are moist.       Pharynx: Oropharynx is clear. No oropharyngeal exudate or posterior oropharyngeal erythema.   Eyes:      General: No scleral icterus.        Right eye: No discharge.         Left eye: No discharge.      Extraocular Movements: Extraocular movements intact.      Conjunctiva/sclera: Conjunctivae normal.      Pupils: Pupils are equal, round, and reactive to light.   Cardiovascular:      Rate and Rhythm: Normal rate and regular rhythm.      Pulses: Normal pulses.      Heart sounds: Normal heart sounds. No murmur heard.  Pulmonary:      Effort: Pulmonary effort is normal. No respiratory distress.      Breath sounds: Normal breath sounds.   Abdominal:      General: Bowel sounds are normal.      Palpations: Abdomen is soft.      Tenderness: There is no abdominal tenderness.   Musculoskeletal:         General: Normal range of motion.      Cervical back: Normal range of motion and neck supple.   Lymphadenopathy:      Cervical: No cervical adenopathy.   Skin:     General: Skin is warm and dry.      Capillary Refill: Capillary refill takes less than 2 seconds.      Comments: Multiple liop[omas in the RUE and some in the LUE   Neurological:      General: No focal deficit present.      Mental Status: He is alert and oriented to person, place, and time. Mental status is at baseline.      Cranial Nerves: No cranial nerve deficit.   Psychiatric:         Mood and Affect: Mood normal.

## 2024-11-27 LAB
ALBUMIN SERPL-MCNC: 4.5 G/DL (ref 3.8–4.8)
ALP SERPL-CCNC: 89 IU/L (ref 44–121)
ALT SERPL-CCNC: 25 IU/L (ref 0–44)
AST SERPL-CCNC: 24 IU/L (ref 0–40)
BASOPHILS # BLD AUTO: 0 X10E3/UL (ref 0–0.2)
BASOPHILS NFR BLD AUTO: 1 %
BILIRUB SERPL-MCNC: 0.7 MG/DL (ref 0–1.2)
BUN SERPL-MCNC: 15 MG/DL (ref 8–27)
BUN/CREAT SERPL: 15 (ref 10–24)
CALCIUM SERPL-MCNC: 10 MG/DL (ref 8.6–10.2)
CHLORIDE SERPL-SCNC: 103 MMOL/L (ref 96–106)
CHOLEST SERPL-MCNC: 141 MG/DL (ref 100–199)
CO2 SERPL-SCNC: 24 MMOL/L (ref 20–29)
CREAT SERPL-MCNC: 1 MG/DL (ref 0.76–1.27)
EGFR: 80 ML/MIN/1.73
EOSINOPHIL # BLD AUTO: 0.1 X10E3/UL (ref 0–0.4)
EOSINOPHIL NFR BLD AUTO: 3 %
ERYTHROCYTE [DISTWIDTH] IN BLOOD BY AUTOMATED COUNT: 12 % (ref 11.6–15.4)
GLOBULIN SER-MCNC: 2.3 G/DL (ref 1.5–4.5)
GLUCOSE SERPL-MCNC: 82 MG/DL (ref 70–99)
HCT VFR BLD AUTO: 47.4 % (ref 37.5–51)
HDLC SERPL-MCNC: 57 MG/DL
HGB BLD-MCNC: 15.6 G/DL (ref 13–17.7)
IMM GRANULOCYTES # BLD: 0 X10E3/UL (ref 0–0.1)
IMM GRANULOCYTES NFR BLD: 0 %
LDLC SERPL CALC-MCNC: 62 MG/DL (ref 0–99)
LYMPHOCYTES # BLD AUTO: 1.2 X10E3/UL (ref 0.7–3.1)
LYMPHOCYTES NFR BLD AUTO: 30 %
MCH RBC QN AUTO: 33.8 PG (ref 26.6–33)
MCHC RBC AUTO-ENTMCNC: 32.9 G/DL (ref 31.5–35.7)
MCV RBC AUTO: 103 FL (ref 79–97)
MONOCYTES # BLD AUTO: 0.4 X10E3/UL (ref 0.1–0.9)
MONOCYTES NFR BLD AUTO: 11 %
NEUTROPHILS # BLD AUTO: 2.2 X10E3/UL (ref 1.4–7)
NEUTROPHILS NFR BLD AUTO: 55 %
PLATELET # BLD AUTO: 156 X10E3/UL (ref 150–450)
POTASSIUM SERPL-SCNC: 4.6 MMOL/L (ref 3.5–5.2)
PROT SERPL-MCNC: 6.8 G/DL (ref 6–8.5)
RBC # BLD AUTO: 4.61 X10E6/UL (ref 4.14–5.8)
SL AMB VLDL CHOLESTEROL CALC: 22 MG/DL (ref 5–40)
SODIUM SERPL-SCNC: 143 MMOL/L (ref 134–144)
T4 FREE SERPL-MCNC: 1.24 NG/DL (ref 0.82–1.77)
TRIGL SERPL-MCNC: 128 MG/DL (ref 0–149)
TSH SERPL DL<=0.005 MIU/L-ACNC: 3.05 UIU/ML (ref 0.45–4.5)
WBC # BLD AUTO: 4 X10E3/UL (ref 3.4–10.8)

## 2025-01-15 ENCOUNTER — OFFICE VISIT (OUTPATIENT)
Dept: DERMATOLOGY | Facility: CLINIC | Age: 75
End: 2025-01-15
Payer: MEDICARE

## 2025-01-15 VITALS — BODY MASS INDEX: 21.39 KG/M2 | WEIGHT: 172 LBS | HEIGHT: 75 IN

## 2025-01-15 DIAGNOSIS — D22.5 MULTIPLE BENIGN MELANOCYTIC NEVI OF UPPER AND LOWER EXTREMITIES AND TRUNK: ICD-10-CM

## 2025-01-15 DIAGNOSIS — D22.61 MULTIPLE BENIGN MELANOCYTIC NEVI OF UPPER AND LOWER EXTREMITIES AND TRUNK: ICD-10-CM

## 2025-01-15 DIAGNOSIS — Z12.83 SKIN CANCER SCREENING: Primary | ICD-10-CM

## 2025-01-15 DIAGNOSIS — D22.62 MULTIPLE BENIGN MELANOCYTIC NEVI OF UPPER AND LOWER EXTREMITIES AND TRUNK: ICD-10-CM

## 2025-01-15 DIAGNOSIS — D18.01 CHERRY ANGIOMA: ICD-10-CM

## 2025-01-15 DIAGNOSIS — L57.0 KERATOSIS, ACTINIC: ICD-10-CM

## 2025-01-15 DIAGNOSIS — L81.4 LENTIGINES: ICD-10-CM

## 2025-01-15 DIAGNOSIS — D22.72 MULTIPLE BENIGN MELANOCYTIC NEVI OF UPPER AND LOWER EXTREMITIES AND TRUNK: ICD-10-CM

## 2025-01-15 DIAGNOSIS — D22.71 MULTIPLE BENIGN MELANOCYTIC NEVI OF UPPER AND LOWER EXTREMITIES AND TRUNK: ICD-10-CM

## 2025-01-15 DIAGNOSIS — L85.3 XEROSIS OF SKIN: ICD-10-CM

## 2025-01-15 DIAGNOSIS — L82.1 SK (SEBORRHEIC KERATOSIS): ICD-10-CM

## 2025-01-15 DIAGNOSIS — Z85.828 HISTORY OF SCC (SQUAMOUS CELL CARCINOMA) OF SKIN: ICD-10-CM

## 2025-01-15 PROCEDURE — 17003 DESTRUCT PREMALG LES 2-14: CPT | Performed by: DERMATOLOGY

## 2025-01-15 PROCEDURE — 99214 OFFICE O/P EST MOD 30 MIN: CPT | Performed by: DERMATOLOGY

## 2025-01-15 PROCEDURE — 17000 DESTRUCT PREMALG LESION: CPT | Performed by: DERMATOLOGY

## 2025-01-15 RX ORDER — FLUOROURACIL 50 MG/G
CREAM TOPICAL 2 TIMES DAILY
Qty: 30 G | Refills: 0 | Status: SHIPPED | OUTPATIENT
Start: 2025-01-15

## 2025-01-15 NOTE — PATIENT INSTRUCTIONS
HISTORY OF SQUAMOUS CELL CARCINOMA      Physical Exam:  Anatomic Location Affected:  Back of scalp     Assessment and Plan:  Based on a thorough discussion of this condition and the management approach to it (including a comprehensive discussion of the known risks, side effects and potential benefits of treatment), the patient (family) agrees to implement the following specific plan:  When outside we recommend using a wide brim hat, sunglasses, long sleeve and pants, sunscreen with SPF 30+ with reapplication every 2 hours, or SPF specific clothing         How can SCC be prevented?  The most important way to prevent SCC is to avoid sunburn. This is especially important in childhood and early life. Fair skinned individuals and those with a personal or family history of BCC should protect their skin from sun exposure daily, year-round and lifelong.  Stay indoors or under the shade in the middle of the day   Wear covering clothing   Apply high protection factor SPF50+ broad-spectrum sunscreens generously to exposed skin if outdoors   Avoid indoor tanning (sun beds, solaria)        What is the outlook for SCC?  Most SCC are cured by treatment. Cure is most likely if treatment is undertaken when the lesion is small. A small percent of SCC's can spread to lymph  nodes and long term monitoring is indicated.   They are also at increased risk of other skin cancers, especially melanoma. Regular self-skin examinations and long-term annual skin checks by an experienced health professional are recommended.    ACTINIC KERATOSIS    Physical Exam:  Anatomic Location Affected:  scalp, forehead, cheeks, left ear, right eyebrow      Assessment and Plan:  Based on a thorough discussion of this condition and the management approach to it (including a comprehensive discussion of the known risks, side effects and potential benefits of treatment), the patient (family) agrees to implement the following specific plan:  When outside we  recommend using a wide brim hat, sunglasses, long sleeve and pants, sunscreen with SPF 30+ with reapplication every 2 hours, or SPF specific clothing   liquid nitrogen to treat areas. Consent obtained. Expect area to blister, crust, and then fall off within 2 weeks. Please use vaseline.                                     PROCEDURE:  DESTRUCTION OF PRE-MALIGNANT LESIONS  After a thorough discussion of treatment options and risk/benefits/alternatives (including but not limited to local pain, scarring, dyspigmentation, blistering, and possible superinfection), verbal and written consent were obtained and the aforementioned lesions were treated on with cryotherapy using liquid nitrogen x 1 cycle for 5-10 seconds.    TOTAL NUMBER of 4 pre-malignant lesions were treated today on the ANATOMIC LOCATION: cheeks, left ear, right eyebrow.     The patient tolerated the procedure well, and after-care instructions were provided.     [facility] Fluorouracil/calcipotriene cream: apply topically twice a day for 5 days to scalp and upper forehead. This medication will cause extreme redness, inflammation, open sores. You can apply plain Vaseline 20-30 minutes after applying the cream to help with the redness, inflammation and blistering. If it is very uncomfortable, please contact Dr. Dawson and she may order topical steroids. You can also take a break and try to resume and complete the 5 day course when able to. This medication was sent to a compounding pharmacy, [facility].                                                                                                                                                                                                                                                                               [facility]  We use this service to prescribe medications that are often not covered by insurance. Your physician will send your prescription to the pharmacy. You will receive an email  "from [email or web address] where you will follow the instructions within the email to provide your billing and shipping information. Your medicine will be shipped directly to you. If you have any questions, you can call [phone] or email [email or web address]      MELANOCYTIC NEVI (\"Moles\")    Assessment and Plan:  Based on a thorough discussion of this condition and the management approach to it (including a comprehensive discussion of the known risks, side effects and potential benefits of treatment), the patient (family) agrees to implement the following specific plan:  When outside we recommend using a wide brim hat, sunglasses, long sleeve and pants, sunscreen with SPF 30+ with reapplication every 2 hours, or SPF specific clothing   Benign, reassured  Annual skin check     Melanocytic Nevi  Melanocytic nevi (\"moles\") are tan or brown, raised or flat areas of the skin which have an increased number of melanocytes. Melanocytes are the cells in our body which make pigment and account for skin color.    Some moles are present at birth (I.e., \"congenital nevi\"), while others come up later in life (i.e., \"acquired nevi\").  The sun can stimulate the body to make more moles.  Sunburns are not the only thing that triggers more moles.  Chronic sun exposure can do it too.     Clinically distinguishing a healthy mole from melanoma may be difficult, even for experienced dermatologists. The \"ABCDE's\" of moles have been suggested as a means of helping to alert a person to a suspicious mole and the possible increased risk of melanoma.  The suggestions for raising alert are as follows:    Asymmetry: Healthy moles tend to be symmetric, while melanomas are often asymmetric.  Asymmetry means if you draw a line through the mole, the two halves do not match in color, size, shape, or surface texture. Asymmetry can be a result of rapid enlargement of a mole, the development of a raised area on a previously flat lesion, " "scaling, ulceration, bleeding or scabbing within the mole.  Any mole that starts to demonstrate \"asymmetry\" should be examined promptly by a board certified dermatologist.     Border: Healthy moles tend to have discrete, even borders.  The border of a melanoma often blends into the normal skin and does not sharply delineate the mole from normal skin.  Any mole that starts to demonstrate \"uneven borders\" should be examined promptly by a board certified dermatologist.     Color: Healthy moles tend to be one color throughout.  Melanomas tend to be made up of different colors ranging from dark black, blue, white, or red.  Any mole that demonstrates a color change should be examined promptly by a board certified dermatologist.     Diameter: Healthy moles tend to be smaller than 0.6 cm in size; an exception are \"congenital nevi\" that can be larger.  Melanomas tend to grow and can often be greater than 0.6 cm (1/4 of an inch, or the size of a pencil eraser). This is only a guideline, and many normal moles may be larger than 0.6 cm without being unhealthy.  Any mole that starts to change in size (small to bigger or bigger to smaller) should be examined promptly by a board certified dermatologist.     Evolving: Healthy moles tend to \"stay the same.\"  Melanomas may often show signs of change or evolution such as a change in size, shape, color, or elevation.  Any mole that starts to itch, bleed, crust, burn, hurt, or ulcerate or demonstrate a change or evolution should be examined promptly by a board certified dermatologist.        LENTIGO    Assessment and Plan:  Based on a thorough discussion of this condition and the management approach to it (including a comprehensive discussion of the known risks, side effects and potential benefits of treatment), the patient (family) agrees to implement the following specific plan:  When outside we recommend using a wide brim hat, sunglasses, long sleeve and pants, sunscreen with SPF 30+ " with reapplication every 2 hours, or SPF specific clothing       What is a lentigo?  A lentigo is a pigmented flat or slightly raised lesion with a clearly defined edge. Unlike an ephelis (freckle), it does not fade in the winter months. There are several kinds of lentigo.  The name lentigo originally referred to its appearance resembling a small lentil. The plural of lentigo is lentigines, although “lentigos” is also in common use.    Who gets lentigines?  Lentigines can affect males and females of all ages and races. Solar lentigines are especially prevalent in fair skinned adults. Lentigines associated with syndromes are present at birth or arise during childhood.    What causes lentigines?  Common forms of lentigo are due to exposure to ultraviolet radiation:  Sun damage including sunburn   Indoor tanning   Phototherapy, especially photochemotherapy (PUVA)    Ionizing radiation, eg radiation therapy, can also cause lentigines.  Several familial syndromes associated with widespread lentigines originate from mutations in Edwin-MAP kinase, mTOR signaling and PTEN pathways.    What is the treatment for lentigines?  Most lentigines are left alone. Attempts to lighten them may not be successful. The following approaches are used:  SPF 50+ broad-spectrum sunscreen   Hydroquinone bleaching cream   Alpha hydroxy acids   Vitamin C   Retinoids   Azelaic acid   Chemical peels  Individual lesions can be permanently removed using:  Cryotherapy   Intense pulsed light   Pigment lasers    How can lentigines be prevented?  Lentigines associated with exposure ultraviolet radiation can be prevented by very careful sun protection. Clothing is more successful at preventing new lentigines than are sunscreens.    What is the outlook for lentigines?  Lentigines usually persist. They may increase in number with age and sun exposure. Some in sun-protected sites may fade and disappear.    ULLOA ANGIOMAS    Assessment and Plan:  Based on a  "thorough discussion of this condition and the management approach to it (including a comprehensive discussion of the known risks, side effects and potential benefits of treatment), the patient (family) agrees to implement the following specific plan:  Monitor for changes  Benign, reassured      Assessment and Plan:    Cherry angioma, also known as Daveis de Chris spots, are benign vascular skin lesions. A \"cherry angioma\" is a firm red, blue or purple papule, 0.1-1 cm in diameter. When thrombosed, they can appear black in colour until evaluated with a dermatoscope when the red or purple colour is more easily seen. Cherry angioma may develop on any part of the body but most often appear on the scalp, face, lips and trunk.  An angioma is due to proliferating endothelial cells; these are the cells that line the inside of a blood vessel.    Angiomas can arise in early life or later in life; the most common type of angioma is a cherry angioma.  Cherry angiomas are very common in males and females of any age or race. They are more noticeable in white skin than in skin of colour. They markedly increase in number from about the age of 40. There may be a family history of similar lesions. Eruptive cherry angiomas have been rarely reported to be associated with internal malignancy. The cause of angiomas is unknown. Genetic analysis of cherry angiomas has shown that they frequently carry specific somatic missense mutations in the GNAQ and GNA11 (Q209H) genes, which are involved in other vascular and melanocytic proliferations.      SEBORRHEIC KERATOSIS; NON-INFLAMED    Assessment and Plan:  Based on a thorough discussion of this condition and the management approach to it (including a comprehensive discussion of the known risks, side effects and potential benefits of treatment), the patient (family) agrees to implement the following specific plan:  Monitor for changes  Benign, reassured      Seborrheic Keratosis  A " "seborrheic keratosis is a harmless warty spot that appears during adult life as a common sign of skin aging.  Seborrheic keratoses can arise on any area of skin, covered or uncovered, with the usual exception of the palms and soles. They do not arise from mucous membranes. Seborrheic keratoses can have highly variable appearance.      Seborrheic keratoses are extremely common. It has been estimated that over 90% of adults over the age of 60 years have one or more of them. They occur in males and females of all races, typically beginning to erupt in the 30s or 40s. They are uncommon under the age of 20 years.  The precise cause of seborrhoeic keratoses is not known.  Seborrhoeic keratoses are considered degenerative in nature. As time goes by, seborrheic keratoses tend to become more numerous. Some people inherit a tendency to develop a very large number of them; some people may have hundreds of them.      There is no easy way to remove multiple lesions on a single occasion.  Unless a specific lesion is \"inflamed\" and is causing pain or stinging/burning or is bleeding, most insurance companies do not authorize treatment.    XEROSIS (\"DRY SKIN\")    Assessment and Plan:  Based on a thorough discussion of this condition and the management approach to it (including a comprehensive discussion of the known risks, side effects and potential benefits of treatment), the patient (family) agrees to implement the following specific plan:  Use moisturizer like Eucerin,Cerave or Aveeno Cream 3 times a day for the dry skin            Dry skin refers to skin that feels dry to touch. Dry skin has a dull surface with a rough, scaly quality. The skin is less pliable and cracked. When dryness is severe, the skin may become inflamed and fissured.  Although any body site can be dry, dry skin tends to affect the shins more than any other site.    Dry skin is lacking moisture in the outer horny cell layer (stratum corneum) and this results " in cracks in the skin surface.  Dry skin is also called xerosis, xeroderma or asteatosis (lack of fat).  It can affect males and females of all ages. There is some racial variability in water and lipid content of the skin.  Dry skin that starts in early childhood may be one of about 20 types of ichthyosis (fish-scale skin). There is often a family history of dry skin.   Dry skin is commonly seen in people with atopic dermatitis.  Nearly everyone > 60 years has dry skin.    Dry skin that begins later may be seen in people with certain diseases and conditions.  Postmenopausal women  Hypothyroidism  Chronic renal disease   Malnutrition and weight loss   Subclinical dermatitis   Treatment with certain drugs such as oral retinoids, diuretics and epidermal growth factor receptor inhibitors      What is the treatment for dry skin?  The mainstay of treatment of dry skin and ichthyosis is moisturisers/emollients. They should be applied liberally and often enough to:  Reduce itch   Improve the barrier function   Prevent entry of irritants, bacteria   Reduce transepidermal water loss.      How can dry skin be prevented?  Eliminate aggravating factors:  Reduce the frequency of bathing.   A humidifier in winter and air conditioner in summer   Compare having a short shower with a prolonged soak in a bath.   Use lukewarm, not hot, water.   Replace standard soap with a substitute such as a synthetic detergent cleanser, water-miscible emollient, bath oil, anti-pruritic tar oil, colloidal oatmeal etc.   Apply an emollient liberally and often, particularly shortly after bathing, and when itchy. The drier the skin, the thicker this should be, especially on the hands.    What is the outlook for dry skin?  A tendency to dry skin may persist life-long, or it may improve once contributing factors are controlled.

## 2025-01-15 NOTE — PROGRESS NOTES
"[facility] Dermatology Clinic Note     Patient Name: Benjy Mae  Encounter Date: 01/15/2025     Have you been cared for by a [facility] Dermatologist in the last 3 years and, if so, which description applies to you?    Yes.  I have been here within the last 3 years, and my medical history has NOT changed since that time.  I am MALE/not capable of bearing children.    REVIEW OF SYSTEMS:  Have you recently had or currently have any of the following? No changes in my recent health.   PAST MEDICAL HISTORY:  Have you personally ever had or currently have any of the following?  If \"YES,\" then please provide more detail. No changes in my medical history.   HISTORY OF IMMUNOSUPPRESSION: Do you have a history of any of the following:  Systemic Immunosuppression such as Diabetes, Biologic or Immunotherapy, Chemotherapy, Organ Transplantation, Bone Marrow Transplantation or Prednisone?  No     Answering \"YES\" requires the addition of the dotphrase \"IMMUNOSUPPRESSED\" as the first diagnosis of the patient's visit.   FAMILY HISTORY:  Any \"first degree relatives\" (parent, brother, sister, or child) with the following?    No changes in my family's known health.   PATIENT EXPERIENCE:    Do you want the Dermatologist to perform a COMPLETE skin exam today including a clinical examination under the \"bra and underwear\" areas?  Yes  If necessary, do we have your permission to call and leave a detailed message on your Preferred Phone number that includes your specific medical information?  Yes      Allergies   Allergen Reactions    Ramipril Cough      Current Outpatient Medications:     acetaminophen (TYLENOL) 650 mg CR tablet, Take 1,300 mg by mouth every 8 (eight) hours as needed, Disp: , Rfl:     apixaban (ELIQUIS) 5 mg, Take 1 tablet (5 mg total) by mouth 2 (two) times a day, Disp: 180 tablet, Rfl: 3    Cream Base (PCCA LIPODERM BASE) CREA, PCCA Lipoderm Base cream, Disp: , Rfl:     fluticasone (FLONASE) 50 mcg/act nasal spray, " 2 sprays into each nostril daily, Disp: 9.9 mL, Rfl: 1    Omega-3 Fatty Acids (Omega-3 Fish Oil) 1000 MG CAPS, Take 2 g by mouth daily, Disp: , Rfl:     simvastatin (ZOCOR) 20 mg tablet, TAKE 1 TABLET BY MOUTH DAILY AT  BEDTIME, Disp: 90 tablet, Rfl: 1    sotalol (BETAPACE) 80 mg tablet, TAKE 1 AND 1/2 TABLETS BY MOUTH  TWICE DAILY, Disp: 270 tablet, Rfl: 3    tamsulosin (FLOMAX) 0.4 mg, Take 0.8 mg by mouth daily with dinner, Disp: , Rfl:     Zinc 50 MG TABS, Take 50 mg by mouth daily, Disp: , Rfl:           Whom besides the patient is providing clinical information about today's encounter?   NO ADDITIONAL HISTORIAN (patient alone provided history)    Physical Exam and Assessment/Plan by Diagnosis:     HISTORY OF SQUAMOUS CELL CARCINOMA      Physical Exam:  Anatomic Location Affected:  Back of scalp  Morphological Description of Scar:  Well healed   Suspected Recurrence: no  Regional adenopathy: no     Assessment and Plan:  Based on a thorough discussion of this condition and the management approach to it (including a comprehensive discussion of the known risks, side effects and potential benefits of treatment), the patient (family) agrees to implement the following specific plan:  When outside we recommend using a wide brim hat, sunglasses, long sleeve and pants, sunscreen with SPF 30+ with reapplication every 2 hours, or SPF specific clothing         How can SCC be prevented?  The most important way to prevent SCC is to avoid sunburn. This is especially important in childhood and early life. Fair skinned individuals and those with a personal or family history of BCC should protect their skin from sun exposure daily, year-round and lifelong.  Stay indoors or under the shade in the middle of the day   Wear covering clothing   Apply high protection factor SPF50+ broad-spectrum sunscreens generously to exposed skin if outdoors   Avoid indoor tanning (sun beds, solaria)        What is the outlook for SCC?  Most SCC  are cured by treatment. Cure is most likely if treatment is undertaken when the lesion is small. A small percent of SCC's can spread to lymph  nodes and long term monitoring is indicated.   They are also at increased risk of other skin cancers, especially melanoma. Regular self-skin examinations and long-term annual skin checks by an experienced health professional are recommended.    ACTINIC KERATOSIS    Physical Exam:  Anatomic Location Affected:  scalp, forehead, cheeks, left ear, right eyebrow  Morphological Description:  Scaly pink papules  Pertinent Positives:  Pertinent Negatives:      Assessment and Plan:  Based on a thorough discussion of this condition and the management approach to it (including a comprehensive discussion of the known risks, side effects and potential benefits of treatment), the patient (family) agrees to implement the following specific plan:  When outside we recommend using a wide brim hat, sunglasses, long sleeve and pants, sunscreen with SPF 30+ with reapplication every 2 hours, or SPF specific clothing   liquid nitrogen to treat areas. Consent obtained. Expect area to blister, crust, and then fall off within 2 weeks. Please use vaseline.                                     PROCEDURE:  DESTRUCTION OF PRE-MALIGNANT LESIONS  After a thorough discussion of treatment options and risk/benefits/alternatives (including but not limited to local pain, scarring, dyspigmentation, blistering, and possible superinfection), verbal and written consent were obtained and the aforementioned lesions were treated on with cryotherapy using liquid nitrogen x 1 cycle for 5-10 seconds.    TOTAL NUMBER of 4 pre-malignant lesions were treated today on the ANATOMIC LOCATION: cheeks, left ear, right eyebrow.     The patient tolerated the procedure well, and after-care instructions were provided.     [facility] Fluorouracil/calcipotriene cream: apply topically twice a day for 5 days to scalp and upper  "forehead. This medication will cause extreme redness, inflammation, open sores. You can apply plain Vaseline 20-30 minutes after applying the cream to help with the redness, inflammation and blistering. If it is very uncomfortable, please contact Dr. Dawson and she may order topical steroids. You can also take a break and try to resume and complete the 5 day course when able to. This medication was sent to a compounding pharmacy, [facility].                                                                                                                                                                                                                                                                               [facility]  We use this service to prescribe medications that are often not covered by insurance. Your physician will send your prescription to the pharmacy. You will receive an email from [email or web address] where you will follow the instructions within the email to provide your billing and shipping information. Your medicine will be shipped directly to you. If you have any questions, you can call [phone] or email [email or web address]      MELANOCYTIC NEVI (\"Moles\")    Physical Exam:  Anatomic Location Affected:   Mostly on sun-exposed areas of the trunk and extremities  Morphological Description:  Scattered, 1-4mm round to ovoid, symmetrical-appearing, even bordered, skin colored to dark brown macules/papules, mostly in sun-exposed areas  Pertinent Positives:  Pertinent Negatives:    Additional History of Present Condition:      Assessment and Plan:  Based on a thorough discussion of this condition and the management approach to it (including a comprehensive discussion of the known risks, side effects and potential benefits of treatment), the patient (family) agrees to implement the following specific plan:  When outside we recommend using a wide brim hat, sunglasses, long sleeve and " "pants, sunscreen with SPF 30+ with reapplication every 2 hours, or SPF specific clothing   Benign, reassured  Annual skin check     Melanocytic Nevi  Melanocytic nevi (\"moles\") are tan or brown, raised or flat areas of the skin which have an increased number of melanocytes. Melanocytes are the cells in our body which make pigment and account for skin color.    Some moles are present at birth (I.e., \"congenital nevi\"), while others come up later in life (i.e., \"acquired nevi\").  The sun can stimulate the body to make more moles.  Sunburns are not the only thing that triggers more moles.  Chronic sun exposure can do it too.     Clinically distinguishing a healthy mole from melanoma may be difficult, even for experienced dermatologists. The \"ABCDE's\" of moles have been suggested as a means of helping to alert a person to a suspicious mole and the possible increased risk of melanoma.  The suggestions for raising alert are as follows:    Asymmetry: Healthy moles tend to be symmetric, while melanomas are often asymmetric.  Asymmetry means if you draw a line through the mole, the two halves do not match in color, size, shape, or surface texture. Asymmetry can be a result of rapid enlargement of a mole, the development of a raised area on a previously flat lesion, scaling, ulceration, bleeding or scabbing within the mole.  Any mole that starts to demonstrate \"asymmetry\" should be examined promptly by a board certified dermatologist.     Border: Healthy moles tend to have discrete, even borders.  The border of a melanoma often blends into the normal skin and does not sharply delineate the mole from normal skin.  Any mole that starts to demonstrate \"uneven borders\" should be examined promptly by a board certified dermatologist.     Color: Healthy moles tend to be one color throughout.  Melanomas tend to be made up of different colors ranging from dark black, blue, white, or red.  Any mole that demonstrates a color change " "should be examined promptly by a board certified dermatologist.     Diameter: Healthy moles tend to be smaller than 0.6 cm in size; an exception are \"congenital nevi\" that can be larger.  Melanomas tend to grow and can often be greater than 0.6 cm (1/4 of an inch, or the size of a pencil eraser). This is only a guideline, and many normal moles may be larger than 0.6 cm without being unhealthy.  Any mole that starts to change in size (small to bigger or bigger to smaller) should be examined promptly by a board certified dermatologist.     Evolving: Healthy moles tend to \"stay the same.\"  Melanomas may often show signs of change or evolution such as a change in size, shape, color, or elevation.  Any mole that starts to itch, bleed, crust, burn, hurt, or ulcerate or demonstrate a change or evolution should be examined promptly by a board certified dermatologist.        LENTIGO    Physical Exam:  Anatomic Location Affected:  trunk, arms  Morphological Description:  Light brown macules  Pertinent Positives:  Pertinent Negatives:    Additional History of Present Condition:      Assessment and Plan:  Based on a thorough discussion of this condition and the management approach to it (including a comprehensive discussion of the known risks, side effects and potential benefits of treatment), the patient (family) agrees to implement the following specific plan:  When outside we recommend using a wide brim hat, sunglasses, long sleeve and pants, sunscreen with SPF 30+ with reapplication every 2 hours, or SPF specific clothing       What is a lentigo?  A lentigo is a pigmented flat or slightly raised lesion with a clearly defined edge. Unlike an ephelis (freckle), it does not fade in the winter months. There are several kinds of lentigo.  The name lentigo originally referred to its appearance resembling a small lentil. The plural of lentigo is lentigines, although “lentigos” is also in common use.    Who gets " lentigines?  Lentigines can affect males and females of all ages and races. Solar lentigines are especially prevalent in fair skinned adults. Lentigines associated with syndromes are present at birth or arise during childhood.    What causes lentigines?  Common forms of lentigo are due to exposure to ultraviolet radiation:  Sun damage including sunburn   Indoor tanning   Phototherapy, especially photochemotherapy (PUVA)    Ionizing radiation, eg radiation therapy, can also cause lentigines.  Several familial syndromes associated with widespread lentigines originate from mutations in Edwin-MAP kinase, mTOR signaling and PTEN pathways.    What is the treatment for lentigines?  Most lentigines are left alone. Attempts to lighten them may not be successful. The following approaches are used:  SPF 50+ broad-spectrum sunscreen   Hydroquinone bleaching cream   Alpha hydroxy acids   Vitamin C   Retinoids   Azelaic acid   Chemical peels  Individual lesions can be permanently removed using:  Cryotherapy   Intense pulsed light   Pigment lasers    How can lentigines be prevented?  Lentigines associated with exposure ultraviolet radiation can be prevented by very careful sun protection. Clothing is more successful at preventing new lentigines than are sunscreens.    What is the outlook for lentigines?  Lentigines usually persist. They may increase in number with age and sun exposure. Some in sun-protected sites may fade and disappear.    ULLOA ANGIOMAS    Physical Exam:  Anatomic Location Affected:  trunk  Morphological Description:  Scattered cherry red, 1-4 mm papules.  Pertinent Positives:  Pertinent Negatives:    Additional History of Present Condition:      Assessment and Plan:  Based on a thorough discussion of this condition and the management approach to it (including a comprehensive discussion of the known risks, side effects and potential benefits of treatment), the patient (family) agrees to implement the following  "specific plan:  Monitor for changes  Benign, reassured      Assessment and Plan:    Cherry angioma, also known as Davies de Chris spots, are benign vascular skin lesions. A \"cherry angioma\" is a firm red, blue or purple papule, 0.1-1 cm in diameter. When thrombosed, they can appear black in colour until evaluated with a dermatoscope when the red or purple colour is more easily seen. Cherry angioma may develop on any part of the body but most often appear on the scalp, face, lips and trunk.  An angioma is due to proliferating endothelial cells; these are the cells that line the inside of a blood vessel.    Angiomas can arise in early life or later in life; the most common type of angioma is a cherry angioma.  Cherry angiomas are very common in males and females of any age or race. They are more noticeable in white skin than in skin of colour. They markedly increase in number from about the age of 40. There may be a family history of similar lesions. Eruptive cherry angiomas have been rarely reported to be associated with internal malignancy. The cause of angiomas is unknown. Genetic analysis of cherry angiomas has shown that they frequently carry specific somatic missense mutations in the GNAQ and GNA11 (Q209H) genes, which are involved in other vascular and melanocytic proliferations.      SEBORRHEIC KERATOSIS; NON-INFLAMED    Physical Exam:  Anatomic Location Affected:  trunk  Morphological Description:  Flat and raised, waxy, smooth to warty textured, yellow to brownish-grey to dark brown to blackish, discrete, \"stuck-on\" appearing papules.  Pertinent Positives:  Pertinent Negatives:    Additional History of Present Condition:      Assessment and Plan:  Based on a thorough discussion of this condition and the management approach to it (including a comprehensive discussion of the known risks, side effects and potential benefits of treatment), the patient (family) agrees to implement the following specific " "plan:  Monitor for changes  Benign, reassured      Seborrheic Keratosis  A seborrheic keratosis is a harmless warty spot that appears during adult life as a common sign of skin aging.  Seborrheic keratoses can arise on any area of skin, covered or uncovered, with the usual exception of the palms and soles. They do not arise from mucous membranes. Seborrheic keratoses can have highly variable appearance.      Seborrheic keratoses are extremely common. It has been estimated that over 90% of adults over the age of 60 years have one or more of them. They occur in males and females of all races, typically beginning to erupt in the 30s or 40s. They are uncommon under the age of 20 years.  The precise cause of seborrhoeic keratoses is not known.  Seborrhoeic keratoses are considered degenerative in nature. As time goes by, seborrheic keratoses tend to become more numerous. Some people inherit a tendency to develop a very large number of them; some people may have hundreds of them.      There is no easy way to remove multiple lesions on a single occasion.  Unless a specific lesion is \"inflamed\" and is causing pain or stinging/burning or is bleeding, most insurance companies do not authorize treatment.    XEROSIS (\"DRY SKIN\")    Physical Exam:  Anatomic Location Affected:  diffuse  Morphological Description:  xerosis  Pertinent Positives:  Pertinent Negatives:    Additional History of Present Condition:      Assessment and Plan:  Based on a thorough discussion of this condition and the management approach to it (including a comprehensive discussion of the known risks, side effects and potential benefits of treatment), the patient (family) agrees to implement the following specific plan:  Use moisturizer like Eucerin,Cerave or Aveeno Cream 3 times a day for the dry skin            Dry skin refers to skin that feels dry to touch. Dry skin has a dull surface with a rough, scaly quality. The skin is less pliable and cracked. " When dryness is severe, the skin may become inflamed and fissured.  Although any body site can be dry, dry skin tends to affect the shins more than any other site.    Dry skin is lacking moisture in the outer horny cell layer (stratum corneum) and this results in cracks in the skin surface.  Dry skin is also called xerosis, xeroderma or asteatosis (lack of fat).  It can affect males and females of all ages. There is some racial variability in water and lipid content of the skin.  Dry skin that starts in early childhood may be one of about 20 types of ichthyosis (fish-scale skin). There is often a family history of dry skin.   Dry skin is commonly seen in people with atopic dermatitis.  Nearly everyone > 60 years has dry skin.    Dry skin that begins later may be seen in people with certain diseases and conditions.  Postmenopausal women  Hypothyroidism  Chronic renal disease   Malnutrition and weight loss   Subclinical dermatitis   Treatment with certain drugs such as oral retinoids, diuretics and epidermal growth factor receptor inhibitors      What is the treatment for dry skin?  The mainstay of treatment of dry skin and ichthyosis is moisturisers/emollients. They should be applied liberally and often enough to:  Reduce itch   Improve the barrier function   Prevent entry of irritants, bacteria   Reduce transepidermal water loss.      How can dry skin be prevented?  Eliminate aggravating factors:  Reduce the frequency of bathing.   A humidifier in winter and air conditioner in summer   Compare having a short shower with a prolonged soak in a bath.   Use lukewarm, not hot, water.   Replace standard soap with a substitute such as a synthetic detergent cleanser, water-miscible emollient, bath oil, anti-pruritic tar oil, colloidal oatmeal etc.   Apply an emollient liberally and often, particularly shortly after bathing, and when itchy. The drier the skin, the thicker this should be, especially on the hands.    What is  the outlook for dry skin?  A tendency to dry skin may persist life-long, or it may improve once contributing factors are controlled.     Scribe Attestation      I,:  Kimberley Paz MA am acting as a scribe while in the presence of the attending physician.:       I,:  Camelia Dawson MD personally performed the services described in this documentation    as scribed in my presence.:

## 2025-01-20 ENCOUNTER — HOSPITAL ENCOUNTER (OUTPATIENT)
Dept: NON INVASIVE DIAGNOSTICS | Facility: CLINIC | Age: 75
Discharge: HOME | End: 2025-01-20
Payer: MEDICARE

## 2025-01-20 DIAGNOSIS — R20.9 COLD HANDS AND FEET: ICD-10-CM

## 2025-01-20 PROCEDURE — 93923 UPR/LXTR ART STDY 3+ LVLS: CPT

## 2025-01-20 PROCEDURE — 93923 UPR/LXTR ART STDY 3+ LVLS: CPT | Performed by: SURGERY

## 2025-01-21 ENCOUNTER — RESULTS FOLLOW-UP (OUTPATIENT)
Dept: FAMILY MEDICINE CLINIC | Facility: CLINIC | Age: 75
End: 2025-01-21

## 2025-01-30 DIAGNOSIS — I48.0 PAF (PAROXYSMAL ATRIAL FIBRILLATION) (HCC): ICD-10-CM

## 2025-02-05 RX ORDER — SOTALOL HYDROCHLORIDE 80 MG/1
120 TABLET ORAL 2 TIMES DAILY
Qty: 270 TABLET | Refills: 3 | Status: SHIPPED | OUTPATIENT
Start: 2025-02-05

## 2025-02-18 DIAGNOSIS — Z00.6 ENCOUNTER FOR EXAMINATION FOR NORMAL COMPARISON OR CONTROL IN CLINICAL RESEARCH PROGRAM: ICD-10-CM

## 2025-02-24 DIAGNOSIS — E78.2 MIXED HYPERLIPIDEMIA: ICD-10-CM

## 2025-02-26 RX ORDER — SIMVASTATIN 20 MG
20 TABLET ORAL
Qty: 90 TABLET | Refills: 1 | Status: SHIPPED | OUTPATIENT
Start: 2025-02-26

## 2025-03-25 ENCOUNTER — TELEPHONE (OUTPATIENT)
Age: 75
End: 2025-03-25

## 2025-03-25 NOTE — TELEPHONE ENCOUNTER
"Hello,    The following message was sent via e-mail to the leadership team:     Please advise if you can help facilitate the following overbook request:    Patient Name: Benjy Mae    Patient MRN: [id number]     Call back #: [phone]    Insurance: Medicare/[employer]    Department:Cardiology    Speciality: General Cardiology    Reason for overbook request: PATIENT REQUEST    Comments (Write \"N/a\" if no comments): Patient was due to return to see Dr. Harper for a 6 month followup end of April.  Due to a change in the provider's scheduled his 5/6/25 appt had to be r/s. Patient declined appt with [facility].  He was scheduled with Dr. Harper next available appt and put on the wait list.  However, in doing so, his appt is currently scheduled 1 year from date he was last seen by the doctor as opposed to the 6 month followup advised.    Requested doctor and location: Dr. Harper/[place]    Date of current appointment: 11/17/25      Thank you.  "

## 2025-04-09 ENCOUNTER — TELEPHONE (OUTPATIENT)
Age: 75
End: 2025-04-09

## 2025-05-07 ENCOUNTER — OFFICE VISIT (OUTPATIENT)
Age: 75
End: 2025-05-07
Payer: MEDICARE

## 2025-05-07 ENCOUNTER — PREP FOR PROCEDURE (OUTPATIENT)
Age: 75
End: 2025-05-07

## 2025-05-07 VITALS
BODY MASS INDEX: 21.64 KG/M2 | DIASTOLIC BLOOD PRESSURE: 70 MMHG | HEIGHT: 75 IN | OXYGEN SATURATION: 98 % | HEART RATE: 104 BPM | WEIGHT: 174 LBS | SYSTOLIC BLOOD PRESSURE: 118 MMHG

## 2025-05-07 DIAGNOSIS — Z80.0 FAMILY HISTORY OF COLON CANCER: Primary | ICD-10-CM

## 2025-05-07 DIAGNOSIS — Z86.0100 HISTORY OF COLON POLYPS: ICD-10-CM

## 2025-05-07 PROCEDURE — 99214 OFFICE O/P EST MOD 30 MIN: CPT | Performed by: PHYSICIAN ASSISTANT

## 2025-05-07 RX ORDER — SODIUM CHLORIDE, SODIUM LACTATE, POTASSIUM CHLORIDE, CALCIUM CHLORIDE 600; 310; 30; 20 MG/100ML; MG/100ML; MG/100ML; MG/100ML
125 INJECTION, SOLUTION INTRAVENOUS CONTINUOUS
OUTPATIENT
Start: 2025-05-07

## 2025-05-07 RX ORDER — MELOXICAM 15 MG/1
15 TABLET ORAL DAILY
COMMUNITY

## 2025-05-07 NOTE — PATIENT INSTRUCTIONS
Scheduled date of colonoscopy (as of today): 5/22/25  Physician performing colonoscopy: Nidia  Location of colonoscopy: [place]  Bowel prep reviewed with patient: Miralax  Instructions reviewed with patient by: Stacey ELLIS  Clearances:

## 2025-05-07 NOTE — PROGRESS NOTES
Name: Benjy Mae      : 1950      MRN: [id number]  Encounter Provider: Kanchan Damon PA-C  Encounter Date: 2025   Encounter department: [facility] [place]  :  Assessment & Plan  Family history of colon cancer    History of colon polyps    Patient presents to schedule a colonoscopy.  He has a mother with colon cancer.  Last colonoscopy was in May of 2022 and 5 adenomas were removed. Repeat colonoscopy recommended in 3 years.    Will plan for colonoscopy to investigate.    Note: He is on Eliquis for PAF and will hold 2 days prior to the procedure.  Orders:    Colonoscopy; Future        History of Present Illness   HPI  Benjy Mae is a 74 y.o. male with a PMH of PAF on Eliquis, HTN, hyperlipidemia who presents to the office to schedule a colonoscopy.  Patient's last colonoscopy was in May of 2022 and 5 adenomas were removed.  Repeat colonoscopy was recommended in 3 years.  He has a mother with colon cancer.  No rectal bleeding or changes in his bowel movements.  No abdominal pain.  He has tolerated anesthesia for the prior colonoscopies well.  He reports he has held his Eliquis prior without any problems.    I discussed informed consent with the patient for the colonoscopy. The risks/benefits of the procedure were discussed with the patient. Risks included, but not limited to, infection, bleeding, perforation were discussed. Patient was agreeable.   History obtained from: patient    Review of Systems   Constitutional: Negative.    HENT: Negative.     Eyes: Negative.    Respiratory: Negative.     Cardiovascular: Negative.    Gastrointestinal: Negative.    Endocrine: Negative.    Genitourinary: Negative.    Musculoskeletal: Negative.    Skin: Negative.    Neurological: Negative.    Psychiatric/Behavioral: Negative.       Medical History Reviewed by provider this encounter:  Meds     .  Past Medical History   Past Medical History:   Diagnosis Date    Acne      Arthritis     Atrial fibrillation (HCC)     Benign hypertension     Blood coagulation disorder (HCC)     Chronic bilateral low back pain without sciatica 04/24/2018    Clotting disorder (HCC)     Colon polyp     Hernia 11/1/2021    bilateral inguinal hernia repair 12/1/21    Hip arthritis 01/22/2019    Hypertriglyceridemia     Lipid metabolism disorder     Nasal congestion     Neurogenic bladder     Other postprocedural cardiac functional disturbances following cardiac surgery     Preop examination 01/22/2019    Scoliosis     Skin tag     Squamous cell skin cancer     scalp    Urinary retention 2018    Urinary tract infection 2018    Varicella      Past Surgical History:   Procedure Laterality Date    ADENOIDECTOMY      COLONOSCOPY      CYSTOSCOPY  2019    HERNIA REPAIR      HIP SURGERY Right 02/2019    KNEE SURGERY Bilateral     LIPOMA RESECTION      MOHS SURGERY  2012    TONSILLECTOMY       Family History   Problem Relation Age of Onset    Diabetes Mother     Colon cancer Mother     Lung cancer Father     Alcohol abuse Father     Suicide Attempts Brother         Suicide 2001    Suicide Attempts Brother         Suicide 2001      reports that he has quit smoking. He has never used smokeless tobacco. He reports current alcohol use of about 1.0 standard drink of alcohol per week. He reports that he does not use drugs.  Current Outpatient Medications   Medication Instructions    acetaminophen (TYLENOL) 1,300 mg, Every 8 hours PRN    apixaban (ELIQUIS) 5 mg, Oral, 2 times daily    Cream Base (PCCA LIPODERM BASE) CREA PCCA Lipoderm Base cream    fluorouracil (EFUDEX) 5 % cream Topical, 2 times daily, For 5 days to scalp and upper forehead    fluticasone (FLONASE) 50 mcg/act nasal spray 2 sprays, Nasal, Daily    fluticasone (FLONASE) 50 mcg/act nasal spray 2 sprays, Nasal, 2 times daily    meloxicam (MOBIC) 15 mg, Daily    Mometasone Furoate POWD Compound Mometasone 1 mg capsule to be added to sinus rinse twice daily     Omega-3 Fish Oil 2 g, Daily    simvastatin (ZOCOR) 20 mg, Oral, Daily at bedtime    sotalol (BETAPACE) 120 mg, Oral, 2 times daily    tamsulosin (FLOMAX) 0.8 mg, Daily with dinner    Zinc 50 mg, Daily     Allergies   Allergen Reactions    Ramipril Cough      Current Outpatient Medications on File Prior to Visit   Medication Sig Dispense Refill    acetaminophen (TYLENOL) 650 mg CR tablet Take 1,300 mg by mouth every 8 (eight) hours as needed      fluticasone (FLONASE) 50 mcg/act nasal spray 2 sprays into each nostril daily 9.9 mL 1    meloxicam (MOBIC) 15 mg tablet Take 15 mg by mouth daily      Mometasone Furoate POWD Compound Mometasone 1 mg capsule to be added to sinus rinse twice daily 180 g 3    Omega-3 Fatty Acids (Omega-3 Fish Oil) 1000 MG CAPS Take 2 g by mouth daily      simvastatin (ZOCOR) 20 mg tablet TAKE 1 TABLET BY MOUTH DAILY AT  BEDTIME 90 tablet 1    sotalol (BETAPACE) 80 mg tablet TAKE 1 AND 1/2 TABLETS BY MOUTH  TWICE DAILY 270 tablet 3    tamsulosin (FLOMAX) 0.4 mg Take 0.8 mg by mouth daily with dinner      Zinc 50 MG TABS Take 50 mg by mouth daily      apixaban (ELIQUIS) 5 mg Take 1 tablet (5 mg total) by mouth 2 (two) times a day 180 tablet 3    Cream Base (PCCA LIPODERM BASE) CREA PCCA Lipoderm Base cream (Patient not taking: Reported on 1/15/2025)      fluorouracil (EFUDEX) 5 % cream Apply topically 2 (two) times a day For 5 days to scalp and upper forehead (Patient not taking: Reported on 3/3/2025) 30 g 0    fluticasone (FLONASE) 50 mcg/act nasal spray 2 sprays into each nostril 2 (two) times a day (Patient not taking: Reported on 3/3/2025) 16 g 5     No current facility-administered medications on file prior to visit.      Social History     Tobacco Use    Smoking status: Former    Smokeless tobacco: Never    Tobacco comments:     quite in 1972   Vaping Use    Vaping status: Never Used   Substance and Sexual Activity    Alcohol use: Yes     Alcohol/week: 1.0 standard drink of alcohol      "Types: 1 Glasses of wine per week     Comment: each night    Drug use: No    Sexual activity: Not on file        Objective   /70   Pulse 104   Ht 6' 3\" (1.905 m)   Wt 78.9 kg (174 lb)   SpO2 98%   BMI 21.75 kg/m²      Physical Exam  Constitutional:       General: He is not in acute distress.     Appearance: Normal appearance. He is not ill-appearing.   HENT:      Head: Normocephalic and atraumatic.   Cardiovascular:      Rate and Rhythm: Normal rate and regular rhythm.   Pulmonary:      Effort: Pulmonary effort is normal. No respiratory distress.      Breath sounds: Normal breath sounds.   Skin:     General: Skin is warm and dry.   Neurological:      Mental Status: He is alert and oriented to person, place, and time.   Psychiatric:         Mood and Affect: Mood normal.         Behavior: Behavior normal.           "

## 2025-05-07 NOTE — H&P (VIEW-ONLY)
Name: Benjy Mae      : 1950      MRN: [id number]  Encounter Provider: Kanchan Damon PA-C  Encounter Date: 2025   Encounter department: [facility] [place]  :  Assessment & Plan  Family history of colon cancer    History of colon polyps    Patient presents to schedule a colonoscopy.  He has a mother with colon cancer.  Last colonoscopy was in May of 2022 and 5 adenomas were removed. Repeat colonoscopy recommended in 3 years.    Will plan for colonoscopy to investigate.    Note: He is on Eliquis for PAF and will hold 2 days prior to the procedure.  Orders:    Colonoscopy; Future        History of Present Illness   HPI  Benjy Mae is a 74 y.o. male with a PMH of PAF on Eliquis, HTN, hyperlipidemia who presents to the office to schedule a colonoscopy.  Patient's last colonoscopy was in May of 2022 and 5 adenomas were removed.  Repeat colonoscopy was recommended in 3 years.  He has a mother with colon cancer.  No rectal bleeding or changes in his bowel movements.  No abdominal pain.  He has tolerated anesthesia for the prior colonoscopies well.  He reports he has held his Eliquis prior without any problems.    I discussed informed consent with the patient for the colonoscopy. The risks/benefits of the procedure were discussed with the patient. Risks included, but not limited to, infection, bleeding, perforation were discussed. Patient was agreeable.   History obtained from: patient    Review of Systems   Constitutional: Negative.    HENT: Negative.     Eyes: Negative.    Respiratory: Negative.     Cardiovascular: Negative.    Gastrointestinal: Negative.    Endocrine: Negative.    Genitourinary: Negative.    Musculoskeletal: Negative.    Skin: Negative.    Neurological: Negative.    Psychiatric/Behavioral: Negative.       Medical History Reviewed by provider this encounter:  Meds     .  Past Medical History   Past Medical History:   Diagnosis Date    Acne      Arthritis     Atrial fibrillation (HCC)     Benign hypertension     Blood coagulation disorder (HCC)     Chronic bilateral low back pain without sciatica 04/24/2018    Clotting disorder (HCC)     Colon polyp     Hernia 11/1/2021    bilateral inguinal hernia repair 12/1/21    Hip arthritis 01/22/2019    Hypertriglyceridemia     Lipid metabolism disorder     Nasal congestion     Neurogenic bladder     Other postprocedural cardiac functional disturbances following cardiac surgery     Preop examination 01/22/2019    Scoliosis     Skin tag     Squamous cell skin cancer     scalp    Urinary retention 2018    Urinary tract infection 2018    Varicella      Past Surgical History:   Procedure Laterality Date    ADENOIDECTOMY      COLONOSCOPY      CYSTOSCOPY  2019    HERNIA REPAIR      HIP SURGERY Right 02/2019    KNEE SURGERY Bilateral     LIPOMA RESECTION      MOHS SURGERY  2012    TONSILLECTOMY       Family History   Problem Relation Age of Onset    Diabetes Mother     Colon cancer Mother     Lung cancer Father     Alcohol abuse Father     Suicide Attempts Brother         Suicide 2001    Suicide Attempts Brother         Suicide 2001      reports that he has quit smoking. He has never used smokeless tobacco. He reports current alcohol use of about 1.0 standard drink of alcohol per week. He reports that he does not use drugs.  Current Outpatient Medications   Medication Instructions    acetaminophen (TYLENOL) 1,300 mg, Every 8 hours PRN    apixaban (ELIQUIS) 5 mg, Oral, 2 times daily    Cream Base (PCCA LIPODERM BASE) CREA PCCA Lipoderm Base cream    fluorouracil (EFUDEX) 5 % cream Topical, 2 times daily, For 5 days to scalp and upper forehead    fluticasone (FLONASE) 50 mcg/act nasal spray 2 sprays, Nasal, Daily    fluticasone (FLONASE) 50 mcg/act nasal spray 2 sprays, Nasal, 2 times daily    meloxicam (MOBIC) 15 mg, Daily    Mometasone Furoate POWD Compound Mometasone 1 mg capsule to be added to sinus rinse twice daily     Omega-3 Fish Oil 2 g, Daily    simvastatin (ZOCOR) 20 mg, Oral, Daily at bedtime    sotalol (BETAPACE) 120 mg, Oral, 2 times daily    tamsulosin (FLOMAX) 0.8 mg, Daily with dinner    Zinc 50 mg, Daily     Allergies   Allergen Reactions    Ramipril Cough      Current Outpatient Medications on File Prior to Visit   Medication Sig Dispense Refill    acetaminophen (TYLENOL) 650 mg CR tablet Take 1,300 mg by mouth every 8 (eight) hours as needed      fluticasone (FLONASE) 50 mcg/act nasal spray 2 sprays into each nostril daily 9.9 mL 1    meloxicam (MOBIC) 15 mg tablet Take 15 mg by mouth daily      Mometasone Furoate POWD Compound Mometasone 1 mg capsule to be added to sinus rinse twice daily 180 g 3    Omega-3 Fatty Acids (Omega-3 Fish Oil) 1000 MG CAPS Take 2 g by mouth daily      simvastatin (ZOCOR) 20 mg tablet TAKE 1 TABLET BY MOUTH DAILY AT  BEDTIME 90 tablet 1    sotalol (BETAPACE) 80 mg tablet TAKE 1 AND 1/2 TABLETS BY MOUTH  TWICE DAILY 270 tablet 3    tamsulosin (FLOMAX) 0.4 mg Take 0.8 mg by mouth daily with dinner      Zinc 50 MG TABS Take 50 mg by mouth daily      apixaban (ELIQUIS) 5 mg Take 1 tablet (5 mg total) by mouth 2 (two) times a day 180 tablet 3    Cream Base (PCCA LIPODERM BASE) CREA PCCA Lipoderm Base cream (Patient not taking: Reported on 1/15/2025)      fluorouracil (EFUDEX) 5 % cream Apply topically 2 (two) times a day For 5 days to scalp and upper forehead (Patient not taking: Reported on 3/3/2025) 30 g 0    fluticasone (FLONASE) 50 mcg/act nasal spray 2 sprays into each nostril 2 (two) times a day (Patient not taking: Reported on 3/3/2025) 16 g 5     No current facility-administered medications on file prior to visit.      Social History     Tobacco Use    Smoking status: Former    Smokeless tobacco: Never    Tobacco comments:     quite in 1972   Vaping Use    Vaping status: Never Used   Substance and Sexual Activity    Alcohol use: Yes     Alcohol/week: 1.0 standard drink of alcohol      "Types: 1 Glasses of wine per week     Comment: each night    Drug use: No    Sexual activity: Not on file        Objective   /70   Pulse 104   Ht 6' 3\" (1.905 m)   Wt 78.9 kg (174 lb)   SpO2 98%   BMI 21.75 kg/m²      Physical Exam  Constitutional:       General: He is not in acute distress.     Appearance: Normal appearance. He is not ill-appearing.   HENT:      Head: Normocephalic and atraumatic.   Cardiovascular:      Rate and Rhythm: Normal rate and regular rhythm.   Pulmonary:      Effort: Pulmonary effort is normal. No respiratory distress.      Breath sounds: Normal breath sounds.   Skin:     General: Skin is warm and dry.   Neurological:      Mental Status: He is alert and oriented to person, place, and time.   Psychiatric:         Mood and Affect: Mood normal.         Behavior: Behavior normal.           "

## 2025-05-19 ENCOUNTER — PREP FOR PROCEDURE (OUTPATIENT)
Age: 75
End: 2025-05-19

## 2025-05-22 ENCOUNTER — ANESTHESIA EVENT (OUTPATIENT)
Dept: GASTROENTEROLOGY | Facility: HOSPITAL | Age: 75
End: 2025-05-22
Payer: MEDICARE

## 2025-05-22 ENCOUNTER — ANESTHESIA (OUTPATIENT)
Dept: GASTROENTEROLOGY | Facility: HOSPITAL | Age: 75
End: 2025-05-22
Payer: MEDICARE

## 2025-05-22 ENCOUNTER — HOSPITAL ENCOUNTER (OUTPATIENT)
Dept: GASTROENTEROLOGY | Facility: HOSPITAL | Age: 75
Setting detail: OUTPATIENT SURGERY
End: 2025-05-22
Attending: PHYSICIAN ASSISTANT
Payer: MEDICARE

## 2025-05-22 VITALS
BODY MASS INDEX: 20.78 KG/M2 | SYSTOLIC BLOOD PRESSURE: 96 MMHG | RESPIRATION RATE: 12 BRPM | WEIGHT: 167.11 LBS | HEIGHT: 75 IN | OXYGEN SATURATION: 97 % | DIASTOLIC BLOOD PRESSURE: 63 MMHG | TEMPERATURE: 97.9 F | HEART RATE: 94 BPM

## 2025-05-22 DIAGNOSIS — Z86.0100 HISTORY OF COLON POLYPS: ICD-10-CM

## 2025-05-22 DIAGNOSIS — Z80.0 FAMILY HISTORY OF COLON CANCER: ICD-10-CM

## 2025-05-22 PROCEDURE — 88305 TISSUE EXAM BY PATHOLOGIST: CPT | Performed by: PATHOLOGY

## 2025-05-22 PROCEDURE — 45385 COLONOSCOPY W/LESION REMOVAL: CPT | Performed by: INTERNAL MEDICINE

## 2025-05-22 RX ORDER — SODIUM CHLORIDE, SODIUM LACTATE, POTASSIUM CHLORIDE, CALCIUM CHLORIDE 600; 310; 30; 20 MG/100ML; MG/100ML; MG/100ML; MG/100ML
INJECTION, SOLUTION INTRAVENOUS CONTINUOUS PRN
Status: DISCONTINUED | OUTPATIENT
Start: 2025-05-22 | End: 2025-05-22

## 2025-05-22 RX ORDER — PROPOFOL 10 MG/ML
INJECTION, EMULSION INTRAVENOUS AS NEEDED
Status: DISCONTINUED | OUTPATIENT
Start: 2025-05-22 | End: 2025-05-22

## 2025-05-22 RX ADMIN — PROPOFOL 100 MG: 10 INJECTION, EMULSION INTRAVENOUS at 07:15

## 2025-05-22 RX ADMIN — PROPOFOL 30 MG: 10 INJECTION, EMULSION INTRAVENOUS at 07:19

## 2025-05-22 RX ADMIN — PROPOFOL 30 MG: 10 INJECTION, EMULSION INTRAVENOUS at 07:25

## 2025-05-22 RX ADMIN — PROPOFOL 30 MG: 10 INJECTION, EMULSION INTRAVENOUS at 07:22

## 2025-05-22 RX ADMIN — PROPOFOL 30 MG: 10 INJECTION, EMULSION INTRAVENOUS at 07:17

## 2025-05-22 RX ADMIN — SODIUM CHLORIDE, SODIUM LACTATE, POTASSIUM CHLORIDE, AND CALCIUM CHLORIDE: .6; .31; .03; .02 INJECTION, SOLUTION INTRAVENOUS at 07:04

## 2025-05-22 NOTE — INTERVAL H&P NOTE
H&P reviewed. After examining the patient I find no changes in the patients condition since the H&P had been written.    Vitals:    05/22/25 0646   BP: 103/63   Pulse: 102   Resp: 14   Temp: 97.5 °F (36.4 °C)   SpO2: 97%

## 2025-05-22 NOTE — ANESTHESIA PREPROCEDURE EVALUATION
Procedure:  COLONOSCOPY    Relevant Problems   CARDIO   (+) Essential hypertension   (+) Mixed hyperlipidemia   (+) PAF (paroxysmal atrial fibrillation) (HCC)   (+) Thoracic aortic aneurysm without rupture (HCC)        Physical Exam    Airway     Mallampati score: II  TM Distance: >3 FB  Neck ROM: full      Cardiovascular      Dental   No notable dental hx     Pulmonary      Neurological      Other Findings        Anesthesia Plan  ASA Score- 3     Anesthesia Type- IV sedation with anesthesia with ASA Monitors.         Additional Monitors:     Airway Plan:            Plan Factors-Exercise tolerance (METS): >4 METS.    Chart reviewed. EKG reviewed.   Patient summary reviewed.    Patient is not a current smoker.      There is medical exclusion for perioperative obstructive sleep apnea risk education.        Induction- intravenous.    Postoperative Plan-         Informed Consent- Anesthetic plan and risks discussed with patient.  I personally reviewed this patient with the CRNA. Discussed and agreed on the Anesthesia Plan with the CRNA..      NPO Status:  Vitals Value Taken Time   Date of last liquid 05/22/25 05/22/25 06:49   Time of last liquid 0530 05/22/25 06:49   Date of last solid 05/20/25 05/22/25 06:49   Time of last solid 1800 05/22/25 06:49

## 2025-05-22 NOTE — ANESTHESIA POSTPROCEDURE EVALUATION
Post-Op Assessment Note    CV Status:  Stable    Pain management: adequate       Mental Status:  Alert and awake   Hydration Status:  Euvolemic   PONV Controlled:  Controlled   Airway Patency:  Patent     Post Op Vitals Reviewed: Yes    No anethesia notable event occurred.    Staff: CRNA           Last Filed PACU Vitals:  Vitals Value Taken Time   Temp 97.8    Pulse 90    /72    Resp 18    SpO2 99

## 2025-05-27 PROCEDURE — 88305 TISSUE EXAM BY PATHOLOGIST: CPT | Performed by: PATHOLOGY

## 2025-06-19 ENCOUNTER — TELEPHONE (OUTPATIENT)
Age: 75
End: 2025-06-19

## 2025-06-19 NOTE — TELEPHONE ENCOUNTER
Patients GI provider: Kanchan Damon    Number to return call: [phone]    Reason for call: Pt calling in looking for results of the Biopsy that was done during his 05/22/25 colonoscopy.     Scheduled procedure/appointment date if applicable: Apt/procedure none

## 2025-07-23 ENCOUNTER — HOSPITAL ENCOUNTER (INPATIENT)
Facility: HOSPITAL | Age: 75
LOS: 1 days | Discharge: HOME | DRG: 287 | End: 2025-07-25
Attending: EMERGENCY MEDICINE | Admitting: INTERNAL MEDICINE
Payer: MEDICARE

## 2025-07-23 ENCOUNTER — APPOINTMENT (EMERGENCY)
Dept: CT IMAGING | Facility: HOSPITAL | Age: 75
DRG: 287 | End: 2025-07-23
Payer: MEDICARE

## 2025-07-23 ENCOUNTER — APPOINTMENT (EMERGENCY)
Dept: RADIOLOGY | Facility: HOSPITAL | Age: 75
DRG: 287 | End: 2025-07-23
Payer: MEDICARE

## 2025-07-23 DIAGNOSIS — R79.89 ELEVATED TROPONIN: ICD-10-CM

## 2025-07-23 DIAGNOSIS — R07.9 CHEST PAIN: Primary | ICD-10-CM

## 2025-07-23 DIAGNOSIS — I25.10 CORONARY ARTERY CALCIFICATION SEEN ON CAT SCAN: ICD-10-CM

## 2025-07-23 LAB
2HR DELTA HS TROPONIN: -26 NG/L
ALBUMIN SERPL BCG-MCNC: 4.3 G/DL (ref 3.5–5)
ALP SERPL-CCNC: 82 U/L (ref 34–104)
ALT SERPL W P-5'-P-CCNC: 23 U/L (ref 7–52)
ANION GAP SERPL CALCULATED.3IONS-SCNC: 7 MMOL/L (ref 4–13)
APTT PPP: 30 SECONDS (ref 23–34)
AST SERPL W P-5'-P-CCNC: 25 U/L (ref 13–39)
BASOPHILS # BLD AUTO: 0.01 THOUSANDS/ÂΜL (ref 0–0.1)
BASOPHILS NFR BLD AUTO: 0 % (ref 0–1)
BILIRUB SERPL-MCNC: 0.86 MG/DL (ref 0.2–1)
BUN SERPL-MCNC: 21 MG/DL (ref 5–25)
CALCIUM SERPL-MCNC: 9.7 MG/DL (ref 8.4–10.2)
CARDIAC TROPONIN I PNL SERPL HS: 468 NG/L (ref ?–50)
CARDIAC TROPONIN I PNL SERPL HS: 494 NG/L (ref ?–50)
CHLORIDE SERPL-SCNC: 106 MMOL/L (ref 96–108)
CO2 SERPL-SCNC: 27 MMOL/L (ref 21–32)
CREAT SERPL-MCNC: 0.9 MG/DL (ref 0.6–1.3)
EOSINOPHIL # BLD AUTO: 0.09 THOUSAND/ÂΜL (ref 0–0.61)
EOSINOPHIL NFR BLD AUTO: 2 % (ref 0–6)
ERYTHROCYTE [DISTWIDTH] IN BLOOD BY AUTOMATED COUNT: 12.1 % (ref 11.6–15.1)
GFR SERPL CREATININE-BSD FRML MDRD: 84 ML/MIN/1.73SQ M
GLUCOSE SERPL-MCNC: 87 MG/DL (ref 65–140)
HCT VFR BLD AUTO: 43.5 % (ref 36.5–49.3)
HGB BLD-MCNC: 15.5 G/DL (ref 12–17)
IMM GRANULOCYTES # BLD AUTO: 0.01 THOUSAND/UL (ref 0–0.2)
IMM GRANULOCYTES NFR BLD AUTO: 0 % (ref 0–2)
INR PPP: 1.12 (ref 0.85–1.19)
LYMPHOCYTES # BLD AUTO: 1.48 THOUSANDS/ÂΜL (ref 0.6–4.47)
LYMPHOCYTES NFR BLD AUTO: 28 % (ref 14–44)
MCH RBC QN AUTO: 34.7 PG (ref 26.8–34.3)
MCHC RBC AUTO-ENTMCNC: 35.6 G/DL (ref 31.4–37.4)
MCV RBC AUTO: 97 FL (ref 82–98)
MONOCYTES # BLD AUTO: 0.46 THOUSAND/ÂΜL (ref 0.17–1.22)
MONOCYTES NFR BLD AUTO: 9 % (ref 4–12)
NEUTROPHILS # BLD AUTO: 3.33 THOUSANDS/ÂΜL (ref 1.85–7.62)
NEUTS SEG NFR BLD AUTO: 61 % (ref 43–75)
NRBC BLD AUTO-RTO: 0 /100 WBCS
PLATELET # BLD AUTO: 145 THOUSANDS/UL (ref 149–390)
PMV BLD AUTO: 10.3 FL (ref 8.9–12.7)
POTASSIUM SERPL-SCNC: 4.1 MMOL/L (ref 3.5–5.3)
PROT SERPL-MCNC: 6.9 G/DL (ref 6.4–8.4)
PROTHROMBIN TIME: 15.2 SECONDS (ref 12.3–15)
RBC # BLD AUTO: 4.47 MILLION/UL (ref 3.88–5.62)
SODIUM SERPL-SCNC: 140 MMOL/L (ref 135–147)
WBC # BLD AUTO: 5.38 THOUSAND/UL (ref 4.31–10.16)

## 2025-07-23 PROCEDURE — 85730 THROMBOPLASTIN TIME PARTIAL: CPT | Performed by: EMERGENCY MEDICINE

## 2025-07-23 PROCEDURE — 36415 COLL VENOUS BLD VENIPUNCTURE: CPT

## 2025-07-23 PROCEDURE — 84484 ASSAY OF TROPONIN QUANT: CPT

## 2025-07-23 PROCEDURE — 99285 EMERGENCY DEPT VISIT HI MDM: CPT | Performed by: EMERGENCY MEDICINE

## 2025-07-23 PROCEDURE — 85610 PROTHROMBIN TIME: CPT | Performed by: EMERGENCY MEDICINE

## 2025-07-23 PROCEDURE — 93005 ELECTROCARDIOGRAM TRACING: CPT

## 2025-07-23 PROCEDURE — 99285 EMERGENCY DEPT VISIT HI MDM: CPT

## 2025-07-23 PROCEDURE — 85025 COMPLETE CBC W/AUTO DIFF WBC: CPT

## 2025-07-23 PROCEDURE — 71275 CT ANGIOGRAPHY CHEST: CPT

## 2025-07-23 PROCEDURE — 99222 1ST HOSP IP/OBS MODERATE 55: CPT | Performed by: FAMILY MEDICINE

## 2025-07-23 PROCEDURE — 80053 COMPREHEN METABOLIC PANEL: CPT

## 2025-07-23 PROCEDURE — 71045 X-RAY EXAM CHEST 1 VIEW: CPT

## 2025-07-23 RX ORDER — METOPROLOL TARTRATE 25 MG/1
25 TABLET, FILM COATED ORAL DAILY
Status: DISCONTINUED | OUTPATIENT
Start: 2025-07-24 | End: 2025-07-25 | Stop reason: HOSPADM

## 2025-07-23 RX ORDER — ACETAMINOPHEN 325 MG/1
650 TABLET ORAL EVERY 6 HOURS PRN
Status: DISCONTINUED | OUTPATIENT
Start: 2025-07-23 | End: 2025-07-25 | Stop reason: HOSPADM

## 2025-07-23 RX ORDER — HEPARIN SODIUM 1000 [USP'U]/ML
2000 INJECTION, SOLUTION INTRAVENOUS; SUBCUTANEOUS EVERY 6 HOURS PRN
Status: DISCONTINUED | OUTPATIENT
Start: 2025-07-23 | End: 2025-07-24

## 2025-07-23 RX ORDER — TAMSULOSIN HYDROCHLORIDE 0.4 MG/1
0.8 CAPSULE ORAL
Status: DISCONTINUED | OUTPATIENT
Start: 2025-07-24 | End: 2025-07-25 | Stop reason: HOSPADM

## 2025-07-23 RX ORDER — METOPROLOL TARTRATE 25 MG/1
25 TABLET, FILM COATED ORAL DAILY
COMMUNITY

## 2025-07-23 RX ORDER — HEPARIN SODIUM 1000 [USP'U]/ML
4000 INJECTION, SOLUTION INTRAVENOUS; SUBCUTANEOUS EVERY 6 HOURS PRN
Status: DISCONTINUED | OUTPATIENT
Start: 2025-07-23 | End: 2025-07-24

## 2025-07-23 RX ORDER — ONDANSETRON 2 MG/ML
4 INJECTION INTRAMUSCULAR; INTRAVENOUS EVERY 6 HOURS PRN
Status: DISCONTINUED | OUTPATIENT
Start: 2025-07-23 | End: 2025-07-25 | Stop reason: HOSPADM

## 2025-07-23 RX ORDER — HEPARIN SODIUM 10000 [USP'U]/100ML
3-20 INJECTION, SOLUTION INTRAVENOUS
Status: DISCONTINUED | OUTPATIENT
Start: 2025-07-23 | End: 2025-07-24

## 2025-07-23 RX ORDER — HEPARIN SODIUM 1000 [USP'U]/ML
4000 INJECTION, SOLUTION INTRAVENOUS; SUBCUTANEOUS ONCE
Status: COMPLETED | OUTPATIENT
Start: 2025-07-23 | End: 2025-07-24

## 2025-07-23 RX ORDER — PRAVASTATIN SODIUM 40 MG
40 TABLET ORAL
Status: DISCONTINUED | OUTPATIENT
Start: 2025-07-24 | End: 2025-07-25 | Stop reason: HOSPADM

## 2025-07-23 RX ORDER — MORPHINE SULFATE 4 MG/ML
4 INJECTION, SOLUTION INTRAMUSCULAR; INTRAVENOUS EVERY 4 HOURS PRN
Status: DISCONTINUED | OUTPATIENT
Start: 2025-07-23 | End: 2025-07-25 | Stop reason: HOSPADM

## 2025-07-23 RX ADMIN — IOHEXOL 85 ML: 350 INJECTION, SOLUTION INTRAVENOUS at 21:20

## 2025-07-23 NOTE — Clinical Note
Dr. Mathieu Ocampo aware of pulse ox running 88% to 92% on room air after [facility] treatment when at rest. Pulse ox will increase to 95-86% when encourage to deep breathe but cough also increases with deep breaths. Also has fine expiratory wheezes after HHN which is improved from coarse wheezes pre-treatment. Pt placed on oxygen at 2L/NC. The left coronary artery was injected and visualized. Multiple views of the injected vessel were taken.

## 2025-07-24 ENCOUNTER — APPOINTMENT (OUTPATIENT)
Dept: NON INVASIVE DIAGNOSTICS | Facility: HOSPITAL | Age: 75
DRG: 287 | End: 2025-07-24
Payer: MEDICARE

## 2025-07-24 PROBLEM — I48.19 PERSISTENT ATRIAL FIBRILLATION (HCC): Status: ACTIVE | Noted: 2018-11-29

## 2025-07-24 PROBLEM — R79.89 ELEVATED TROPONIN: Status: ACTIVE | Noted: 2025-07-24

## 2025-07-24 PROBLEM — I25.10 CORONARY ARTERY CALCIFICATION SEEN ON CAT SCAN: Status: ACTIVE | Noted: 2025-07-24

## 2025-07-24 LAB
4HR DELTA HS TROPONIN: -95 NG/L
ANION GAP SERPL CALCULATED.3IONS-SCNC: 6 MMOL/L (ref 4–13)
AORTIC ROOT: 4 CM
APTT PPP: 86 SECONDS (ref 23–34)
ASCENDING AORTA: 3.3 CM
ATRIAL RATE: 214 BPM
ATRIAL RATE: 258 BPM
ATRIAL RATE: 277 BPM
BSA FOR ECHO PROCEDURE: 2.03 M2
BUN SERPL-MCNC: 16 MG/DL (ref 5–25)
CALCIUM SERPL-MCNC: 9.4 MG/DL (ref 8.4–10.2)
CARDIAC TROPONIN I PNL SERPL HS: 399 NG/L (ref ?–50)
CHLORIDE SERPL-SCNC: 108 MMOL/L (ref 96–108)
CO2 SERPL-SCNC: 27 MMOL/L (ref 21–32)
CREAT SERPL-MCNC: 0.75 MG/DL (ref 0.6–1.3)
ERYTHROCYTE [DISTWIDTH] IN BLOOD BY AUTOMATED COUNT: 12.1 % (ref 11.6–15.1)
FRACTIONAL SHORTENING: 23 (ref 28–44)
GFR SERPL CREATININE-BSD FRML MDRD: 91 ML/MIN/1.73SQ M
GLUCOSE P FAST SERPL-MCNC: 86 MG/DL (ref 65–99)
GLUCOSE SERPL-MCNC: 86 MG/DL (ref 65–140)
HCT VFR BLD AUTO: 43.5 % (ref 36.5–49.3)
HGB BLD-MCNC: 15.1 G/DL (ref 12–17)
INTERVENTRICULAR SEPTUM IN DIASTOLE (PARASTERNAL SHORT AXIS VIEW): 0.8 CM
INTERVENTRICULAR SEPTUM: 0.8 CM (ref 0.6–1.1)
IVC: 2.4 MM
LAAS-AP2: 13.9 CM2
LAAS-AP4: 13.9 CM2
LEFT ATRIUM AREA SYSTOLE SINGLE PLANE A4C: 11.9 CM2
LEFT ATRIUM SIZE: 3.3 CM
LEFT ATRIUM VOLUME (MOD BIPLANE): 31 ML
LEFT ATRIUM VOLUME INDEX (MOD BIPLANE): 15.3 ML/M2
LEFT INTERNAL DIMENSION IN SYSTOLE: 3.4 CM (ref 2.1–4)
LEFT VENTRICULAR INTERNAL DIMENSION IN DIASTOLE: 4.4 CM (ref 3.5–6)
LEFT VENTRICULAR POSTERIOR WALL IN END DIASTOLE: 0.8 CM
LEFT VENTRICULAR STROKE VOLUME: 39 ML
LV EF US.2D.A4C+ESTIMATED: 52 %
LVSV (TEICH): 39 ML
MAGNESIUM SERPL-MCNC: 2.1 MG/DL (ref 1.9–2.7)
MCH RBC QN AUTO: 33.8 PG (ref 26.8–34.3)
MCHC RBC AUTO-ENTMCNC: 34.7 G/DL (ref 31.4–37.4)
MCV RBC AUTO: 97 FL (ref 82–98)
P AXIS: 264 DEGREES
P AXIS: 73 DEGREES
PLATELET # BLD AUTO: 140 THOUSANDS/UL (ref 149–390)
PMV BLD AUTO: 10.5 FL (ref 8.9–12.7)
POTASSIUM SERPL-SCNC: 3.9 MMOL/L (ref 3.5–5.3)
QRS AXIS: 88 DEGREES
QRS AXIS: 96 DEGREES
QRS AXIS: 99 DEGREES
QRSD INTERVAL: 84 MS
QRSD INTERVAL: 88 MS
QRSD INTERVAL: 90 MS
QT INTERVAL: 344 MS
QT INTERVAL: 350 MS
QT INTERVAL: 370 MS
QTC INTERVAL: 401 MS
QTC INTERVAL: 416 MS
QTC INTERVAL: 429 MS
RBC # BLD AUTO: 4.47 MILLION/UL (ref 3.88–5.62)
RIGHT ATRIUM AREA SYSTOLE A4C: 16.7 CM2
RIGHT VENTRICLE ID DIMENSION: 3.3 CM
SL CV LEFT ATRIUM LENGTH A2C: 4.8 CM
SL CV LV EF: 50
SL CV PED ECHO LEFT VENTRICLE DIASTOLIC VOLUME (MOD BIPLANE) 2D: 86 ML
SL CV PED ECHO LEFT VENTRICLE SYSTOLIC VOLUME (MOD BIPLANE) 2D: 47 ML
SODIUM SERPL-SCNC: 141 MMOL/L (ref 135–147)
T WAVE AXIS: 83 DEGREES
T WAVE AXIS: 84 DEGREES
T WAVE AXIS: 86 DEGREES
TR MAX PG: 18 MMHG
TR PEAK VELOCITY: 2.1 M/S
TRICUSPID ANNULAR PLANE SYSTOLIC EXCURSION: 2.2 CM
TRICUSPID VALVE PEAK REGURGITATION VELOCITY: 2.09 M/S
VENTRICULAR RATE: 79 BPM
VENTRICULAR RATE: 81 BPM
VENTRICULAR RATE: 88 BPM
WBC # BLD AUTO: 4.32 THOUSAND/UL (ref 4.31–10.16)

## 2025-07-24 PROCEDURE — C1769 GUIDE WIRE: HCPCS | Performed by: INTERNAL MEDICINE

## 2025-07-24 PROCEDURE — 93010 ELECTROCARDIOGRAM REPORT: CPT | Performed by: INTERNAL MEDICINE

## 2025-07-24 PROCEDURE — 99152 MOD SED SAME PHYS/QHP 5/>YRS: CPT | Performed by: INTERNAL MEDICINE

## 2025-07-24 PROCEDURE — 93454 CORONARY ARTERY ANGIO S&I: CPT | Performed by: INTERNAL MEDICINE

## 2025-07-24 PROCEDURE — 36415 COLL VENOUS BLD VENIPUNCTURE: CPT

## 2025-07-24 PROCEDURE — 99232 SBSQ HOSP IP/OBS MODERATE 35: CPT | Performed by: INTERNAL MEDICINE

## 2025-07-24 PROCEDURE — 76937 US GUIDE VASCULAR ACCESS: CPT | Performed by: INTERNAL MEDICINE

## 2025-07-24 PROCEDURE — 93306 TTE W/DOPPLER COMPLETE: CPT

## 2025-07-24 PROCEDURE — 93005 ELECTROCARDIOGRAM TRACING: CPT

## 2025-07-24 PROCEDURE — B2111ZZ FLUOROSCOPY OF MULTIPLE CORONARY ARTERIES USING LOW OSMOLAR CONTRAST: ICD-10-PCS | Performed by: INTERNAL MEDICINE

## 2025-07-24 PROCEDURE — C1894 INTRO/SHEATH, NON-LASER: HCPCS | Performed by: INTERNAL MEDICINE

## 2025-07-24 PROCEDURE — 99223 1ST HOSP IP/OBS HIGH 75: CPT | Performed by: INTERNAL MEDICINE

## 2025-07-24 PROCEDURE — 93306 TTE W/DOPPLER COMPLETE: CPT | Performed by: INTERNAL MEDICINE

## 2025-07-24 PROCEDURE — 85027 COMPLETE CBC AUTOMATED: CPT | Performed by: FAMILY MEDICINE

## 2025-07-24 PROCEDURE — 83735 ASSAY OF MAGNESIUM: CPT | Performed by: FAMILY MEDICINE

## 2025-07-24 PROCEDURE — 85730 THROMBOPLASTIN TIME PARTIAL: CPT | Performed by: EMERGENCY MEDICINE

## 2025-07-24 PROCEDURE — 80048 BASIC METABOLIC PNL TOTAL CA: CPT | Performed by: FAMILY MEDICINE

## 2025-07-24 PROCEDURE — 84484 ASSAY OF TROPONIN QUANT: CPT

## 2025-07-24 PROCEDURE — 4A023N7 MEASUREMENT OF CARDIAC SAMPLING AND PRESSURE, LEFT HEART, PERCUTANEOUS APPROACH: ICD-10-PCS | Performed by: INTERNAL MEDICINE

## 2025-07-24 RX ORDER — FENTANYL CITRATE 50 UG/ML
INJECTION, SOLUTION INTRAMUSCULAR; INTRAVENOUS CODE/TRAUMA/SEDATION MEDICATION
Status: DISCONTINUED | OUTPATIENT
Start: 2025-07-24 | End: 2025-07-24 | Stop reason: HOSPADM

## 2025-07-24 RX ORDER — MIDAZOLAM HYDROCHLORIDE 2 MG/2ML
INJECTION, SOLUTION INTRAMUSCULAR; INTRAVENOUS CODE/TRAUMA/SEDATION MEDICATION
Status: DISCONTINUED | OUTPATIENT
Start: 2025-07-24 | End: 2025-07-24 | Stop reason: HOSPADM

## 2025-07-24 RX ORDER — VERAPAMIL HCL 2.5 MG/ML
AMPUL (ML) INTRAVENOUS CODE/TRAUMA/SEDATION MEDICATION
Status: DISCONTINUED | OUTPATIENT
Start: 2025-07-24 | End: 2025-07-24 | Stop reason: HOSPADM

## 2025-07-24 RX ORDER — ASPIRIN 81 MG/1
81 TABLET ORAL DAILY
Status: DISCONTINUED | OUTPATIENT
Start: 2025-07-24 | End: 2025-07-25 | Stop reason: HOSPADM

## 2025-07-24 RX ORDER — SODIUM CHLORIDE 9 MG/ML
75 INJECTION, SOLUTION INTRAVENOUS CONTINUOUS
Status: DISCONTINUED | OUTPATIENT
Start: 2025-07-24 | End: 2025-07-24

## 2025-07-24 RX ORDER — SODIUM CHLORIDE 9 MG/ML
75 INJECTION, SOLUTION INTRAVENOUS CONTINUOUS
Status: DISPENSED | OUTPATIENT
Start: 2025-07-24 | End: 2025-07-24

## 2025-07-24 RX ORDER — LIDOCAINE WITH 8.4% SOD BICARB 0.9%(10ML)
SYRINGE (ML) INJECTION CODE/TRAUMA/SEDATION MEDICATION
Status: DISCONTINUED | OUTPATIENT
Start: 2025-07-24 | End: 2025-07-24 | Stop reason: HOSPADM

## 2025-07-24 RX ORDER — NITROGLYCERIN 20 MG/100ML
INJECTION INTRAVENOUS CODE/TRAUMA/SEDATION MEDICATION
Status: DISCONTINUED | OUTPATIENT
Start: 2025-07-24 | End: 2025-07-24 | Stop reason: HOSPADM

## 2025-07-24 RX ADMIN — SODIUM CHLORIDE 75 ML/HR: 0.9 INJECTION, SOLUTION INTRAVENOUS at 14:40

## 2025-07-24 RX ADMIN — ASPIRIN 81 MG: 81 TABLET, COATED ORAL at 11:51

## 2025-07-24 RX ADMIN — ACETAMINOPHEN 650 MG: 325 TABLET ORAL at 20:21

## 2025-07-24 RX ADMIN — METOPROLOL TARTRATE 25 MG: 25 TABLET, FILM COATED ORAL at 10:43

## 2025-07-24 RX ADMIN — SODIUM CHLORIDE 75 ML/HR: 0.9 INJECTION, SOLUTION INTRAVENOUS at 10:45

## 2025-07-24 RX ADMIN — TAMSULOSIN HYDROCHLORIDE 0.8 MG: 0.4 CAPSULE ORAL at 16:17

## 2025-07-24 RX ADMIN — HEPARIN SODIUM 4000 UNITS: 1000 INJECTION, SOLUTION INTRAVENOUS; SUBCUTANEOUS at 00:10

## 2025-07-24 RX ADMIN — HEPARIN SODIUM 12 UNITS/KG/HR: 10000 INJECTION, SOLUTION INTRAVENOUS at 00:10

## 2025-07-24 RX ADMIN — PRAVASTATIN SODIUM 40 MG: 40 TABLET ORAL at 16:17

## 2025-07-24 NOTE — ASSESSMENT & PLAN NOTE
Presented with chest pain with associated dyspnea.  Troponins with peak of 494.  EKG shows atrial flutter without acute ischemic abnormalities.  TTE pending.  Plan for cardiac catheterization today.  Start ASA.  Continue pravastatin, Lopressor, and heparin gtt.    Discussed the indications, alternatives, risks and benefit of cardiac catheterization and possible PCI. The procedure risks, benefits, and complications (including but not limited to bleeding, infection, arrhythmia, nephrotoxicity, vessel injury, myocardial infarction, CVA, and death) were reviewed.  Patient is alert and oriented x3 and wishes to proceed. All questions answered.  Denies history of iodinated contrast allergy.

## 2025-07-24 NOTE — CASE MANAGEMENT
Case Management Progress Note    Patient name Benjy Mae  Location 2 [facility] 266/2 E 266-01 MRN [id number]  : 1950 Date 2025       LOS (days): 0  Geometric Mean LOS (GMLOS) (days): 1.8  Days to GMLOS:1.5        OBJECTIVE:        Current admission status: Inpatient  Preferred Pharmacy:   [facility] - [place] [id number] [address]  Phone: [phone] Fax: [phone]    [id number] [place], [place] - [address] [facility]  [address] [address]  Phone: [phone] Fax: [phone]    [facility] [place], [place] - [address]  [address]  [address] [address]  Phone: [phone] Fax: [phone]    Primary Care Provider: Ricardo Torres MD    Primary Insurance: MEDICARE  Secondary Insurance: AARP    PROGRESS NOTE:  Per SLIM rounds, patient anticipated for d/c in 24 hrs.  AMPAC is 24.  No anticipated CM needs identified at this time.  CM will continue to follow through to d/c.

## 2025-07-24 NOTE — PLAN OF CARE
Problem: PAIN - ADULT  Goal: Verbalizes/displays adequate comfort level or baseline comfort level  Description: Interventions:  - Encourage patient to monitor pain and request assistance  - Assess pain using appropriate pain scale  - Administer analgesics as ordered based on type and severity of pain and evaluate response  - Implement non-pharmacological measures as appropriate and evaluate response  - Consider cultural and social influences on pain and pain management  - Notify physician/advanced practitioner if interventions unsuccessful or patient reports new pain  - Educate patient/family on pain management process including their role and importance of  reporting pain   - Provide non-pharmacologic/complimentary pain relief interventions  Outcome: Progressing     Problem: INFECTION - ADULT  Goal: Absence or prevention of progression during hospitalization  Description: INTERVENTIONS:  - Assess and monitor for signs and symptoms of infection  - Monitor lab/diagnostic results  - Monitor all insertion sites, i.e. indwelling lines, tubes, and drains  - Monitor endotracheal if appropriate and nasal secretions for changes in amount and color  - [place] appropriate cooling/warming therapies per order  - Administer medications as ordered  - Instruct and encourage patient and family to use good hand hygiene technique  - Identify and instruct in appropriate isolation precautions for identified infection/condition  Outcome: Progressing  Goal: Absence of fever/infection during neutropenic period  Description: INTERVENTIONS:  - Monitor WBC  - Perform strict hand hygiene  - Limit to healthy visitors only  - No plants, dried, fresh or silk flowers with aceves in patient room  Outcome: Progressing     Problem: SAFETY ADULT  Goal: Patient will remain free of falls  Description: INTERVENTIONS:  - Educate patient/family on patient safety including physical limitations  - Instruct patient to call for assistance with activity   -  Consider consulting OT/PT to assist with strengthening/mobility based on AM PAC & JH-HLM score  - Consult OT/PT to assist with strengthening/mobility   - Keep Call bell within reach  - Keep bed low and locked with side rails adjusted as appropriate  - Keep care items and personal belongings within reach  - Initiate and maintain comfort rounds  - Make Fall Risk Sign visible to staff  - Offer Toileting every 2 Hours, in advance of need  - Initiate/Maintain bed alarm  - Obtain necessary fall risk management equipment:   - Apply yellow socks and bracelet for high fall risk patients  - Consider moving patient to room near nurses station  Outcome: Progressing  Goal: Maintain or return to baseline ADL function  Description: INTERVENTIONS:  -  Assess patient's ability to carry out ADLs; assess patient's baseline for ADL function and identify physical deficits which impact ability to perform ADLs (bathing, care of mouth/teeth, toileting, grooming, dressing, etc.)  - Assess/evaluate cause of self-care deficits   - Assess range of motion  - Assess patient's mobility; develop plan if impaired  - Assess patient's need for assistive devices and provide as appropriate  - Encourage maximum independence but intervene and supervise when necessary  - Involve family in performance of ADLs  - Assess for home care needs following discharge   - Consider OT consult to assist with ADL evaluation and planning for discharge  - Provide patient education as appropriate  - Monitor functional capacity and physical performance, use of AM PAC & JH-HLM   - Monitor gait, balance and fatigue with ambulation    Outcome: Progressing  Goal: Maintains/Returns to pre admission functional level  Description: INTERVENTIONS:  - Perform AM-PAC 6 Click Basic Mobility/ Daily Activity assessment daily.  - Set and communicate daily mobility goal to care team and patient/family/caregiver.   - Collaborate with rehabilitation services on mobility goals if  consulted  - Perform Range of Motion 3 times a day.  - Reposition patient every 2 hours.  - Dangle patient 3 times a day  - Stand patient 3 times a day  - Ambulate patient 3 times a day  - Out of bed to chair 3 times a day   - Out of bed for meals 3 times a day  - Out of bed for toileting  - Record patient progress and toleration of activity level   Outcome: Progressing     Problem: DISCHARGE PLANNING  Goal: Discharge to home or other facility with appropriate resources  Description: INTERVENTIONS:  - Identify barriers to discharge w/patient and caregiver  - Arrange for needed discharge resources and transportation as appropriate  - Identify discharge learning needs (meds, wound care, etc.)  - Arrange for interpretive services to assist at discharge as needed  - Refer to Case Management Department for coordinating discharge planning if the patient needs post-hospital services based on physician/advanced practitioner order or complex needs related to functional status, cognitive ability, or social support system  Outcome: Progressing     Problem: Knowledge Deficit  Goal: Patient/family/caregiver demonstrates understanding of disease process, treatment plan, medications, and discharge instructions  Description: Complete learning assessment and assess knowledge base.  Interventions:  - Provide teaching at level of understanding  - Provide teaching via preferred learning methods  Outcome: Progressing

## 2025-07-24 NOTE — H&P
H&P - Hospitalist   Name: Benjy Mae 75 y.o. male I MRN: [id number]  Unit/Bed#: ED 25 I Date of Admission: 7/23/2025   Date of Service: 7/23/2025 I Hospital Day: 0     Assessment & Plan  Chest pain  Elevated trop  Heparin drip   Consult to cardiology   Hold Eliquis   Essential hypertension  Continue with Lopressor   PAF (paroxysmal atrial fibrillation) (HCC)  Continue Lopressor and currently heparin drip  Takes Eliquis at home  Mixed hyperlipidemia  Continues statin        Disposition  #1 heparin drip  #2  Consulted cardiology  #3  Repeat labs        VTE Pharmacologic Prophylaxis: VTE Score: 3 Moderate Risk (Score 3-4) - Pharmacological DVT Prophylaxis Ordered: heparin drip.  Code Status: Level 1 - Full Code       Anticipated Length of Stay: Patient will be admitted on an observation basis with an anticipated length of stay of less than 2 midnights secondary to chest pain, elevated troponin.    History of Present Illness   Chief Complaint: Chest pain    Benjy Mae is a 75 y.o. male with a PMH of atrial fibs on Eliquis, hypertension, hyperlipidemia who presents with chest pain that happened [place] 2 [place] the beach.  He was very weak and fatigued.  He came home and decided come in for evaluation.  He is found to have elevated troponin level.  Patient has follow-up with outpatient cardiology.  He continues take Eliquis and Lopressor.  He no longer is on sotalol.    Review of Systems   Constitutional:  Positive for activity change.   HENT: Negative.     Respiratory:  Positive for chest tightness and shortness of breath.    Cardiovascular:  Positive for chest pain.   Gastrointestinal: Negative.    Genitourinary: Negative.    Musculoskeletal: Negative.        Historical Information   Past Medical History[1]  Past Surgical History[2]  Social History[3]  E-Cigarette/Vaping    E-Cigarette Use Never User      E-Cigarette/Vaping Substances    Nicotine No     THC No     CBD No     Flavoring No     Other No      Unknown No      Family history non-contributory  Social History:  Marital Status: /Civil Union   Occupation:   Patient Pre-hospital Living Situation: Home  Patient Pre-hospital Level of Mobility: walks  Patient Pre-hospital Diet Restrictions: cardiac     Meds/Allergies   I have reviewed home medications using recent Epic encounter.  Prior to Admission medications    Medication Sig Start Date End Date Taking? Authorizing Provider   acetaminophen (TYLENOL) 650 mg CR tablet Take 1,300 mg by mouth every 8 (eight) hours as needed   Yes Historical Provider, MD   metoprolol tartrate (LOPRESSOR) 25 mg tablet Take 25 mg by mouth in the morning   Yes Historical Provider, MD   simvastatin (ZOCOR) 20 mg tablet TAKE 1 TABLET BY MOUTH DAILY AT  BEDTIME 2/26/25  Yes Larissa Harper MD   tamsulosin (FLOMAX) 0.4 mg Take 0.8 mg by mouth daily with dinner 10/12/14  Yes Historical Provider, MD   Zinc 50 MG TABS Take 50 mg by mouth in the morning.   Yes Historical Provider, MD   apixaban (ELIQUIS) 5 mg Take 1 tablet (5 mg total) by mouth 2 (two) times a day 9/9/21 5/13/25  Melissa Walters PA-C   Cream Base (PCCA LIPODERM BASE) CREA PCCA Lipoderm Base cream  Patient not taking: Reported on 1/15/2025    Historical Provider, MD   fluorouracil (EFUDEX) 5 % cream Apply topically 2 (two) times a day For 5 days to scalp and upper forehead  Patient not taking: Reported on 3/3/2025 1/15/25   Camelia Dawson MD   fluticasone (FLONASE) 50 mcg/act nasal spray 2 sprays into each nostril daily 11/18/24   Ricardo Torres MD   fluticasone (FLONASE) 50 mcg/act nasal spray 2 sprays into each nostril 2 (two) times a day  Patient not taking: Reported on 3/3/2025 2/3/25   Lorne Godinez MD   meloxicam (MOBIC) 15 mg tablet Take 15 mg by mouth in the morning.  Patient not taking: Reported on 7/23/2025    Historical Provider, MD   Mometasone Furoate POWD Compound Mometasone 1 mg capsule to be added to sinus rinse twice daily  Patient not taking:  Reported on 7/23/2025 3/3/25   Lorne Godinez MD   Omega-3 Fatty Acids (Omega-3 Fish Oil) 1000 MG CAPS Take 2 g by mouth in the morning.  Patient not taking: Reported on 7/23/2025    Historical Provider, MD   sotalol (BETAPACE) 80 mg tablet TAKE 1 AND 1/2 TABLETS BY MOUTH  TWICE DAILY  Patient not taking: Reported on 7/23/2025 2/5/25   Larissa Harper MD     Allergies   Allergen Reactions    Ramipril Cough       Objective :  Temp:  [98 °F (36.7 °C)] 98 °F (36.7 °C)  HR:  [80-98] 80  BP: (114-134)/(73-79) 114/73  Resp:  [15-18] 15  SpO2:  [97 %-100 %] 100 %  O2 Device: None (Room air)    Physical Exam  Constitutional:       Appearance: Normal appearance. He is normal weight.   HENT:      Head: Normocephalic and atraumatic.      Nose: Nose normal.      Mouth/Throat:      Mouth: Mucous membranes are moist.      Pharynx: Oropharynx is clear.     Eyes:      Extraocular Movements: Extraocular movements intact.      Conjunctiva/sclera: Conjunctivae normal.       Cardiovascular:      Rate and Rhythm: Normal rate. Rhythm irregular.      Pulses: Normal pulses.      Heart sounds: Normal heart sounds.   Pulmonary:      Effort: Pulmonary effort is normal.      Breath sounds: Normal breath sounds.   Abdominal:      General: There is no distension.      Palpations: Abdomen is soft.      Tenderness: There is no abdominal tenderness. There is no guarding.     Musculoskeletal:         General: No swelling.     Skin:     General: Skin is warm and dry.     Neurological:      General: No focal deficit present.      Mental Status: He is alert and oriented to person, place, and time. Mental status is at baseline.     Psychiatric:         Mood and Affect: Mood normal.         Behavior: Behavior normal.         Thought Content: Thought content normal.            Lab Results: I have reviewed the following results:  Results from last 7 days   Lab Units 07/23/25 2015   WBC Thousand/uL 5.38   HEMOGLOBIN g/dL 15.5   HEMATOCRIT % 43.5    PLATELETS Thousands/uL 145*   SEGS PCT % 61   LYMPHO PCT % 28   MONO PCT % 9   EOS PCT % 2     Results from last 7 days   Lab Units 07/23/25 2015   SODIUM mmol/L 140   POTASSIUM mmol/L 4.1   CHLORIDE mmol/L 106   CO2 mmol/L 27   BUN mg/dL 21   CREATININE mg/dL 0.90   ANION GAP mmol/L 7   CALCIUM mg/dL 9.7   ALBUMIN g/dL 4.3   TOTAL BILIRUBIN mg/dL 0.86   ALK PHOS U/L 82   ALT U/L 23   AST U/L 25   GLUCOSE RANDOM mg/dL 87     Results from last 7 days   Lab Units 07/23/25 2015   INR  1.12       Imaging Results Review: I reviewed radiology reports from this admission including: CT chest.  Other Study Results Review: No additional pertinent studies reviewed.    Administrative Statements     Medical decision making: Moderate  Diagnosis addressed: Chest pain, elevated troponin  Data:   Reviewed  CBC, CMP, troponin, CT chest  Ordered CBC, BMP,  Reviewed external notes from cardiology  Discussion of management with ER provider: Heparin drip          Risk:  Prescription drug management: Heparin drip  Discussion for hospitalization with ER provider: Admission for elevated troponin        ** Please Note: This note has been constructed using a voice recognition system. **         [1]   Past Medical History:  Diagnosis Date    Acne     Arthritis     Atrial fibrillation (HCC)     Benign hypertension     Blood coagulation disorder (HCC)     Chronic bilateral low back pain without sciatica 04/24/2018    Clotting disorder (HCC)     Colon polyp     Hernia 11/1/2021    bilateral inguinal hernia repair 12/1/21    Hip arthritis 01/22/2019    Hypertriglyceridemia     Lipid metabolism disorder     Nasal congestion     Neurogenic bladder     Other postprocedural cardiac functional disturbances following cardiac surgery     Preop examination 01/22/2019    Scoliosis     Skin tag     Squamous cell skin cancer     scalp    Urinary retention 2018    Urinary tract infection 2018    Varicella    [2]   Past Surgical History:  Procedure  Laterality Date    ADENOIDECTOMY      COLONOSCOPY      CYSTOSCOPY  2019    HERNIA REPAIR      HIP SURGERY Right 02/2019    artificial hip    KNEE SURGERY Bilateral     LIPOMA RESECTION      MOHS SURGERY  2012    TONSILLECTOMY     [3]   Social History  Tobacco Use    Smoking status: Former    Smokeless tobacco: Never    Tobacco comments:     quite in 1972   Vaping Use    Vaping status: Never Used   Substance and Sexual Activity    Alcohol use: Yes     Alcohol/week: 1.0 standard drink of alcohol     Types: 1 Glasses of wine per week     Comment: each night    Drug use: No

## 2025-07-24 NOTE — CONSULTS
Consultation - Cardiology   Benjy Mae 75 y.o. male MRN: [id number]  Unit/Bed#: 2 E 266-01 Encounter: [id number]  07/24/25  9:01 AM    Assessment & Plan  Chest pain  Presented with chest pain with associated dyspnea.  Troponins with peak of 494.  EKG shows atrial flutter without acute ischemic abnormalities.  TTE pending.  Plan for cardiac catheterization today.  Start ASA.  Continue pravastatin, Lopressor, and heparin gtt.    Discussed the indications, alternatives, risks and benefit of cardiac catheterization and possible PCI. The procedure risks, benefits, and complications (including but not limited to bleeding, infection, arrhythmia, nephrotoxicity, vessel injury, myocardial infarction, CVA, and death) were reviewed.  Patient is alert and oriented x3 and wishes to proceed. All questions answered.  Denies history of iodinated contrast allergy.  Persistent atrial flutter/fibrillation; H/O ablation (5/2020) and multiple DCCV  EKG shows rate controlled atrial flutter.  Recent event monitor at [facility] reviewed with atrial flutter as well.  Continue Lopressor.  [id number] at least 3.  Eliquis held upon admission, continue heparin gtt.  May be reasonable to consider repeat ablation.  Follows with EP at [facility].    Coronary artery calcification seen on CAT scan  See plan above.  Essential hypertension  Continue Lopressor.  Mixed hyperlipidemia  Continue pravastatin.  Elevated troponin  See plan above.        History of Present Illness   Physician Requesting Consult: Rboin Sommers MD    Reason for Consult / Principal Problem: Chest pain    HPI: Benjy Mae is a 75 y.o. year old male with history of persistent atrial fibrillation/flutter s/p ablation and multiple DCCV, hypertension, and hyperlipidemia who presents with chest pain with associated dyspnea.  Chest pain is described as a tightness/pulled muscle sensation that initially began while at the beach.  Troponins found to be elevated upon admission.   "Cardiology consulted for further evaluation and management.  Patient also endorses associated weakness/fatigue.  Symptoms have significantly improved since time of admission.  Denies any additional complaints at this time.  Endorses compliance to home medications.  Follows with electrophysiology at [facility].  Follows with Dr. Harper as primary cardiologist.      Inpatient consult to Cardiology  Consult performed by: Bj Vidal PA-C  Consult ordered by: Julio César Talavera MD            Review of Systems   Constitutional:  Positive for fatigue. Negative for chills and fever.   HENT:  Negative for ear pain and sore throat.    Eyes:  Negative for pain and visual disturbance.   Respiratory:  Positive for shortness of breath. Negative for cough.    Cardiovascular:  Positive for chest pain. Negative for palpitations and leg swelling.   Gastrointestinal:  Negative for abdominal pain and vomiting.   Genitourinary:  Negative for dysuria and hematuria.   Musculoskeletal:  Negative for arthralgias and back pain.   Skin:  Negative for color change and rash.   Neurological:  Positive for weakness. Negative for seizures and syncope.   All other systems reviewed and are negative.      Historical Information   Past Medical History[1]  Past Surgical History[2]  Social History     Substance and Sexual Activity   Alcohol Use Yes    Alcohol/week: 1.0 standard drink of alcohol    Types: 1 Glasses of wine per week    Comment: each night     Social History     Substance and Sexual Activity   Drug Use No     Tobacco Use History[3]    Family History: Family History[4]    Meds/Allergies   all current active meds have been reviewed  Allergies[5]    Objective   Vitals: Blood pressure 126/94, pulse 82, temperature 97.6 °F (36.4 °C), resp. rate 20, height 6' 3\" (1.905 m), weight 75.8 kg (167 lb), SpO2 98%., Body mass index is 20.87 kg/m².,   Orthostatic Blood Pressures      Flowsheet Row Most Recent Value   Blood Pressure 126/94 filed " at 2025 0854   Patient Position - Orthostatic VS Sitting filed at 2025 0700            Systolic (24hrs), Av , Min:95 , Max:134     Diastolic (24hrs), Av, Min:61, Max:94      No intake or output data in the 24 hours ending 25 0901    Invasive Devices       Peripheral Intravenous Line  Duration             Peripheral IV 25 Left;Ventral (anterior) Forearm <1 day    Peripheral IV 25 Proximal;Right;Ventral (anterior) Forearm <1 day                        Physical Exam:  GEN: Alert and oriented x3, in no acute distress.  Well appearing and well nourished.   HEENT: Sclera anicteric, conjunctivae pink, mucous membranes moist. Oropharynx clear.   NECK: Supple, no carotid bruits, no significant JVD. Trachea midline, no thyromegaly.   HEART: Regular rhythm, normal S1 and S2, no murmurs, clicks, gallops or rubs. PMI nondisplaced, no thrills.   LUNGS: Clear to auscultation bilaterally; no wheezes, rales, or rhonchi. No increased work of breathing or signs of respiratory distress.   ABDOMEN: Soft, nontender, nondistended, normoactive bowel sounds.   EXTREMITIES: Skin warm and well perfused, no clubbing or cyanosis, no edema.  NEURO: No focal findings. Normal speech. Mood and affect normal.   SKIN: Normal without suspicious lesions on exposed skin.    Lab Results:     Cardiac Profile:   Results from last 7 days   Lab Units 25  0016 25  2214 25   HS TNI 0HR ng/L  --   --  494*   HS TNI 2HR ng/L  --  468*  --    HSTNI D2 ng/L  --  -26  --    HS TNI 4HR ng/L 399*  --   --    HSTNI D4 ng/L -95  --   --        CBC with diff:   Results from last 7 days   Lab Units 25  0623 25   WBC Thousand/uL 4.32 5.38   HEMOGLOBIN g/dL 15.1 15.5   HEMATOCRIT % 43.5 43.5   MCV fL 97 97   PLATELETS Thousands/uL 140* 145*   RBC Million/uL 4.47 4.47   MCH pg 33.8 34.7*   MCHC g/dL 34.7 35.6   RDW % 12.1 12.1   MPV fL 10.5 10.3   NRBC AUTO /100 WBCs  --  0         CMP:    Results from last 7 days   Lab Units 07/24/25  0623 07/23/25 2015   POTASSIUM mmol/L 3.9 4.1   CHLORIDE mmol/L 108 106   CO2 mmol/L 27 27   BUN mg/dL 16 21   CREATININE mg/dL 0.75 0.90   CALCIUM mg/dL 9.4 9.7   AST U/L  --  25   ALT U/L  --  23   ALK PHOS U/L  --  82   EGFR ml/min/1.73sq m 91 84            [1]   Past Medical History:  Diagnosis Date    Acne     Arthritis     Atrial fibrillation (HCC)     Benign hypertension     Blood coagulation disorder (HCC)     Chronic bilateral low back pain without sciatica 04/24/2018    Clotting disorder (HCC)     Colon polyp     Hernia 11/1/2021    bilateral inguinal hernia repair 12/1/21    Hip arthritis 01/22/2019    Hypertriglyceridemia     Lipid metabolism disorder     Nasal congestion     Neurogenic bladder     Other postprocedural cardiac functional disturbances following cardiac surgery     Preop examination 01/22/2019    Scoliosis     Skin tag     Squamous cell skin cancer     scalp    Urinary retention 2018    Urinary tract infection 2018    Varicella    [2]   Past Surgical History:  Procedure Laterality Date    ADENOIDECTOMY      COLONOSCOPY      CYSTOSCOPY  2019    HERNIA REPAIR      HIP SURGERY Right 02/2019    artificial hip    KNEE SURGERY Bilateral     LIPOMA RESECTION      MOHS SURGERY  2012    TONSILLECTOMY     [3]   Social History  Tobacco Use   Smoking Status Former   Smokeless Tobacco Never   Tobacco Comments    quite in 1972   [4]   Family History  Problem Relation Name Age of Onset    Diabetes Mother      Colon cancer Mother      Lung cancer Father Harish Mae     Alcohol abuse Father Harish Mae     Suicide Attempts Brother Harish Mae         Suicide 2001    Suicide Attempts Brother Harish Mae         Suicide 2001   [5]   Allergies  Allergen Reactions    Ramipril Cough

## 2025-07-24 NOTE — PROGRESS NOTES
Progress Note - Hospitalist   Name: Benjy Mae 75 y.o. male I MRN: [id number]  Unit/Bed#: 2 E 266-01 I Date of Admission: 7/23/2025   Date of Service: 7/24/2025 I Hospital Day: 0    Assessment & Plan  Chest pain  Elevated trop  Heparin drip   Planning for cardiac cath by cardiology today  N.p.o.  Continue beta-blocker  Telemetry monitoring  Essential hypertension  Continue with Lopressor   Persistent atrial flutter/fibrillation; H/O ablation and multiple DCCV  Continue Lopressor and currently heparin drip  Takes Eliquis at home  Mixed hyperlipidemia  Continues statin  Elevated troponin  Plan as outlined above  Coronary artery calcification seen on CAT scan  Noted    VTE Pharmacologic Prophylaxis: VTE Score: 3 Moderate Risk (Score 3-4) - Pharmacological DVT Prophylaxis Ordered: heparin drip.    Mobility:   Basic Mobility Inpatient Raw Score: 24  JH-HLM Goal: 8: Walk 250 feet or more  JH-HLM Achieved: 7: Walk 25 feet or more  JH-HLM Goal achieved. Continue to encourage appropriate mobility.    Patient Centered Rounds: I performed bedside rounds with nursing staff today.   Discussions with Specialists or Other Care Team Provider: cm, nursing    Education and Discussions with Family / Patient: Patient declined call to .     Current Length of Stay: 0 day(s)  Current Patient Status: Observation   Certification Statement: The patient will continue to require additional inpatient hospital stay due to see below  Discharge Plan: Pending cardiac cath    Code Status: Level 1 - Full Code    Subjective   Denies fevers, chills, cough, shortness of breath, cough    Objective :  Temp:  [97.6 °F (36.4 °C)-98 °F (36.7 °C)] 97.6 °F (36.4 °C)  HR:  [71-98] 82  BP: ()/(61-94) 126/94  Resp:  [14-20] 20  SpO2:  [96 %-100 %] 98 %  O2 Device: None (Room air)    Body mass index is 20.87 kg/m².     Input and Output Summary (last 24 hours):   No intake or output data in the 24 hours ending 07/24/25 1010    Physical  Exam  Constitutional:       General: He is not in acute distress.     Appearance: He is well-developed. He is not diaphoretic.   HENT:      Head: Normocephalic and atraumatic.      Nose: Nose normal.      Mouth/Throat:      Pharynx: No oropharyngeal exudate.     Eyes:      General: No scleral icterus.     Conjunctiva/sclera: Conjunctivae normal.       Cardiovascular:      Rate and Rhythm: Normal rate and regular rhythm.      Heart sounds: Normal heart sounds. No murmur heard.     No friction rub. No gallop.   Pulmonary:      Effort: Pulmonary effort is normal. No respiratory distress.      Breath sounds: Normal breath sounds. No wheezing or rales.   Chest:      Chest wall: No tenderness.   Abdominal:      General: Bowel sounds are normal. There is no distension.      Palpations: Abdomen is soft.      Tenderness: There is no abdominal tenderness. There is no guarding.     Musculoskeletal:         General: No tenderness or deformity. Normal range of motion.      Cervical back: Normal range of motion and neck supple.     Skin:     General: Skin is warm and dry.      Findings: No erythema.     Neurological:      Mental Status: He is alert. Mental status is at baseline.           Lines/Drains:        Telemetry:  Telemetry Orders (From admission, onward)               24 Hour Telemetry Monitoring  Continuous x 24 Hours (Telem)        Expiring   Question:  Reason for 24 Hour Telemetry  Answer:  PCI/EP study (including pacer and ICD implementation), Cardiac surgery, MI, abnormal cardiac cath, and chest pain- rule out MI                     Telemetry Reviewed: Normal Sinus Rhythm  Indication for Continued Telemetry Use: No indication for continued use. Will discontinue.                Lab Results: I have reviewed the following results:   Results from last 7 days   Lab Units 07/24/25  0623 07/23/25 2015   WBC Thousand/uL 4.32 5.38   HEMOGLOBIN g/dL 15.1 15.5   HEMATOCRIT % 43.5 43.5   PLATELETS Thousands/uL 140* 145*   SEGS  PCT %  --  61   LYMPHO PCT %  --  28   MONO PCT %  --  9   EOS PCT %  --  2     Results from last 7 days   Lab Units 07/24/25  0623 07/23/25 2015   SODIUM mmol/L 141 140   POTASSIUM mmol/L 3.9 4.1   CHLORIDE mmol/L 108 106   CO2 mmol/L 27 27   BUN mg/dL 16 21   CREATININE mg/dL 0.75 0.90   ANION GAP mmol/L 6 7   CALCIUM mg/dL 9.4 9.7   ALBUMIN g/dL  --  4.3   TOTAL BILIRUBIN mg/dL  --  0.86   ALK PHOS U/L  --  82   ALT U/L  --  23   AST U/L  --  25   GLUCOSE RANDOM mg/dL 86 87     Results from last 7 days   Lab Units 07/23/25 2015   INR  1.12                   Recent Cultures (last 7 days):         Imaging Results Review: I personally reviewed the following image studies/reports in PACS and discussed pertinent findings with Radiology: chest xray. My interpretation of the radiology images/reports is:  .  Other Study Results Review: EKG was reviewed.     Last 24 Hours Medication List:     Current Facility-Administered Medications:     acetaminophen (TYLENOL) tablet 650 mg, Q6H PRN    heparin (porcine) 25,000 units in 0.45% NaCl 250 mL infusion (premix), Titrated, Last Rate: 12 Units/kg/hr (07/24/25 0010)    heparin (porcine) injection 2,000 Units, Q6H PRN    heparin (porcine) injection 4,000 Units, Q6H PRN    metoprolol tartrate (LOPRESSOR) tablet 25 mg, Daily    morphine injection 2 mg, Q4H PRN    morphine injection 4 mg, Q4H PRN    ondansetron (ZOFRAN) injection 4 mg, Q6H PRN    pravastatin (PRAVACHOL) tablet 40 mg, Daily With Dinner    sodium chloride 0.9 % infusion, Continuous    tamsulosin (FLOMAX) capsule 0.8 mg, Daily With Dinner    Administrative Statements   Today, Patient Was Seen By: Robin Sommers MD  I have spent a total time of 30 minutes in caring for this patient on the day of the visit/encounter including Diagnostic results.    **Please Note: This note may have been constructed using a voice recognition system.**

## 2025-07-24 NOTE — ED PROVIDER NOTES
Time reflects when diagnosis was documented in both MDM as applicable and the Disposition within this note       Time User Action Codes Description Comment    7/23/2025 11:00 PM Mary Owens JENNIFER Add [R07.9] Chest pain     7/23/2025 11:01 PM Mary Oewns Add [R79.89] Elevated troponin           ED Disposition       ED Disposition   Admit    Condition   Stable    Date/Time   Wed Jul 23, 2025 11:00 PM    Comment                  Assessment & Plan       Medical Decision Making  75 male is coming in with complaint of episode of chest pain and refelt like his heart was beating very heavily that happened [place] the beach around 2 [place].  He felt very weak and fatigued at the time and was getting more dyspneic with any exertion.  He did not have any nausea vomiting at the time.  Symptoms lasted about an hour and resolved.  Came home and decided to come in for evaluation.  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  Currently now is asymptomatic.  Reports when his heart was beating heavier and was having the chest discomfort his heart rate was into the 90s.  Does have history of A-fib and was recently on sotalol for A-fib control along with NOAC but was recently switched to beta-blocker.  Does not have coronary artery disease history and reports that he had cardiac cath decades ago.  Will evaluate for new episode of chest discomfort.  Will get EKG and serial troponins to evaluate for cardiac etiology along will get chest x-ray and will get basic labs for electrolytes as well.  Given the right leg swelling and some chest pain and shortness of breath with a history of varicosities and potential clotting disorder and cannot get a duplex of leg after 9 PM at this time we will get CTA of chest to evaluate and rule out PE as underlying etiology as well.  Will discuss with cardiology and depending on troponin patient may need to be admitted  for evaluation versus outpatient workup.    Amount and/or Complexity of Data Reviewed  Labs: ordered. Decision-making details documented in ED Course.  Radiology: ordered and independent interpretation performed.  ECG/medicine tests: ordered and independent interpretation performed.    Risk  Prescription drug management.  Decision regarding hospitalization.        ED Course as of 07/23/25 2309   Wed Jul 23, 2025 2112 hs TnI 0hr(!): 494   2213 D/w pt and family his elevated troponin. Will need admission. He is currently still asympatomatic. Pending CTA to r/o PE b/c of varicosities, clotting issue and right leg swelling. Will then d/w cardiology and medicine. Second trop and EKG being done now.   2255 hs TnI 2hr(!): 468   2301 Sent message to cardiology         Medications   heparin (ACS LOW) (has no administration in time range)   iohexol (OMNIPAQUE) 350 MG/ML injection (MULTI-DOSE) 85 mL (85 mL Intravenous Given 7/23/25 2120)       ED Risk Strat Scores                    No data recorded                            History of Present Illness       Chief Complaint   Patient presents with    Palpitations     Patient reports episode of palpitations lasting 1 hour [place] ocean [place] around 1400 and sob with exertion at times. Denies n/v. Patient reports change in beta blockers recently.        Past Medical History[1]   Past Surgical History[2]   Family History[3]   Social History[4]   E-Cigarette/Vaping    E-Cigarette Use Never User       E-Cigarette/Vaping Substances    Nicotine No     THC No     CBD No     Flavoring No     Other No     Unknown No       I have reviewed and agree with the history as documented.       History provided by:  Patient  Chest Pain  Pain location:  Substernal area  Pain quality: throbbing and tightness    Pain radiates to:  Does not radiate  Pain radiates to the back: no    Pain severity:  Moderate  Onset quality:  Sudden  Duration:  1 hour  Date/time of last known well:  7/23/2025  2:00 PM  Timing:  Constant  Progression:  Partially resolved  Chronicity:  New  Context: at rest    Relieved by:  Nothing  Worsened by:  Nothing tried  Ineffective treatments:  None tried  Associated symptoms: fatigue, palpitations and shortness of breath    Associated symptoms: no altered mental status, no anorexia, no anxiety, no back pain, no claudication, no cough, no diaphoresis, no nausea, not vomiting and no weakness    Risk factors: male sex    Risk factors: no coronary artery disease        Review of Systems   Constitutional:  Positive for fatigue. Negative for diaphoresis.   Respiratory:  Positive for shortness of breath. Negative for cough.    Cardiovascular:  Positive for chest pain and palpitations. Negative for claudication.   Gastrointestinal:  Negative for anorexia, nausea and vomiting.   Musculoskeletal:  Negative for back pain.   Neurological:  Negative for weakness.   All other systems reviewed and are negative.          Objective       ED Triage Vitals   Temperature Pulse Blood Pressure Respirations SpO2 Patient Position - Orthostatic VS   07/23/25 1951 07/23/25 1953 07/23/25 1953 07/23/25 1953 07/23/25 1953 07/23/25 1953   98 °F (36.7 °C) 98 134/77 16 99 % Sitting      Temp src Heart Rate Source BP Location FiO2 (%) Pain Score    -- 07/23/25 1953 07/23/25 1953 -- --     Monitor Left arm        Vitals      Date and Time Temp Pulse SpO2 Resp BP Pain Score FACES Pain Rating User   07/23/25 2130 -- 80 100 % 15 114/73 -- -- AM   07/23/25 2100 -- 81 97 % 16 117/77 -- -- AM   07/23/25 2030 -- 82 98 % 18 122/79 -- -- RD   07/23/25 1953 -- 98 99 % 16 134/77 -- -- SG   07/23/25 1951 98 °F (36.7 °C) -- -- -- -- -- -- SG            Physical Exam  Vitals and nursing note reviewed.   Constitutional:       General: He is not in acute distress.     Appearance: He is well-developed. He is not ill-appearing, toxic-appearing or diaphoretic.   HENT:      Head: Normocephalic and atraumatic.      Nose: Nose normal.      Eyes:      Extraocular Movements: Extraocular movements intact.      Conjunctiva/sclera: Conjunctivae normal.       Cardiovascular:      Rate and Rhythm: Normal rate. Rhythm irregular.      Heart sounds: Normal heart sounds.   Pulmonary:      Effort: Pulmonary effort is normal. No respiratory distress.      Breath sounds: Normal breath sounds.   Abdominal:      General: There is no distension.      Palpations: Abdomen is soft.      Tenderness: There is no abdominal tenderness.     Musculoskeletal:         General: No deformity. Normal range of motion.      Cervical back: Neck supple.     Skin:     General: Skin is warm and dry.     Neurological:      General: No focal deficit present.      Mental Status: He is alert and oriented to person, place, and time. Mental status is at baseline.      Cranial Nerves: No cranial nerve deficit.     Psychiatric:         Mood and Affect: Mood normal.         Results Reviewed       Procedure Component Value Units Date/Time    HS Troponin I 2hr [[id number]]  (Abnormal) Collected: 07/23/25 2214    Lab Status: Final result Specimen: Blood from Arm, Left Updated: 07/23/25 2250     hs TnI 2hr 468 ng/L      Delta 2hr hsTnI -26 ng/L     HS Troponin I 4hr [[id number]]     Lab Status: No result Specimen: Blood     Protime-INR [[id number]]  (Abnormal) Collected: 07/23/25 2015    Lab Status: Final result Specimen: Blood from Arm, Left Updated: 07/23/25 2156     Protime 15.2 seconds      INR 1.12    Narrative:      INR Therapeutic Range    Indication                                             INR Range      Atrial Fibrillation                                               2.0-3.0  Hypercoagulable State                                    2.0.2.3  Left Ventricular Asist Device                            2.0-3.0  Mechanical Heart Valve                                  -    Aortic(with afib, MI, embolism, HF, LA enlargement,    and/or coagulopathy)                                     2.0-3.0  (2.5-3.5)     Mitral                                                             2.5-3.5  Prosthetic/Bioprosthetic Heart Valve               2.0-3.0  Venous thromboembolism (VTE: VT, PE        2.0-3.0    APTT [[id number]]  (Normal) Collected: 07/23/25 2015    Lab Status: Final result Specimen: Blood from Arm, Left Updated: 07/23/25 2156     PTT 30 seconds     HS Troponin 0hr (reflex protocol) [[id number]]  (Abnormal) Collected: 07/23/25 2015    Lab Status: Final result Specimen: Blood from Arm, Left Updated: 07/23/25 2057     hs TnI 0hr 494 ng/L     Comprehensive metabolic panel [[id number]] Collected: 07/23/25 2015    Lab Status: Final result Specimen: Blood from Arm, Left Updated: 07/23/25 2052     Sodium 140 mmol/L      Potassium 4.1 mmol/L      Chloride 106 mmol/L      CO2 27 mmol/L      ANION GAP 7 mmol/L      BUN 21 mg/dL      Creatinine 0.90 mg/dL      Glucose 87 mg/dL      Calcium 9.7 mg/dL      AST 25 U/L      ALT 23 U/L      Alkaline Phosphatase 82 U/L      Total Protein 6.9 g/dL      Albumin 4.3 g/dL      Total Bilirubin 0.86 mg/dL      eGFR 84 ml/min/1.73sq m     Narrative:      National Kid[employer] Foundation guidelines for Chronic Kidney Disease (CKD):     Stage 1 with normal or high GFR (GFR > 90 mL/min/1.73 square meters)    Stage 2 Mild CKD (GFR = 60-89 mL/min/1.73 square meters)    Stage 3A Moderate CKD (GFR = 45-59 mL/min/1.73 square meters)    Stage 3B Moderate CKD (GFR = 30-44 mL/min/1.73 square meters)    Stage 4 Severe CKD (GFR = 15-29 mL/min/1.73 square meters)    Stage 5 End Stage CKD (GFR <15 mL/min/1.73 square meters)  Note: GFR calculation is accurate only with a steady state creatinine    CBC and differential [[id number]]  (Abnormal) Collected: 07/23/25 2015    Lab Status: Final result Specimen: Blood from Arm, Left Updated: 07/23/25 2024     WBC 5.38 Thousand/uL      RBC 4.47 Million/uL      Hemoglobin 15.5 g/dL      Hematocrit 43.5 %      MCV 97 fL      MCH 34.7 pg      MCHC 35.6 g/dL       RDW 12.1 %      MPV 10.3 fL      Platelets 145 Thousands/uL      nRBC 0 /100 WBCs      Segmented % 61 %      Immature Grans % 0 %      Lymphocytes % 28 %      Monocytes % 9 %      Eosinophils Relative 2 %      Basophils Relative 0 %      Absolute Neutrophils 3.33 Thousands/µL      Absolute Immature Grans 0.01 Thousand/uL      Absolute Lymphocytes 1.48 Thousands/µL      Absolute Monocytes 0.46 Thousand/µL      Eosinophils Absolute 0.09 Thousand/µL      Basophils Absolute 0.01 Thousands/µL             CTA chest pe study   Final Interpretation by Brayden Miranda MD (07/23 2220)      No pulmonary embolus identified.      No airspace consolidation, pneumothorax or pleural effusion.         Computerized Assisted Algorithm (CAA) may have aided analysis of applicable images.      Workstation performed: [id number]         XR chest 1 view portable   ED Interpretation by Mary Owens MD (07/23 2113)   NAD          ECG 12 Lead Documentation Only    Date/Time: 7/23/2025 8:40 PM    Performed by: Mary Owens MD  Authorized by: Mary Owens MD    Indications / Diagnosis:  Chest pain  ECG reviewed by me, the ED Provider: yes    Patient location:  ED  Rate:     ECG rate:  88    ECG rate assessment: normal    Rhythm:     Rhythm: atrial fibrillation    ST segments:     ST segments:  Normal  T waves:     T waves: normal        ED Medication and Procedure Management   Prior to Admission Medications   Prescriptions Last Dose Informant Patient Reported? Taking?   Cream Base (PCCA LIPODERM BASE) CREA  Self Yes No   Sig: PCCA Lipoderm Base cream   Patient not taking: Reported on 1/15/2025   Mometasone Furoate POWD Not Taking Self No No   Sig: Compound Mometasone 1 mg capsule to be added to sinus rinse twice daily   Patient not taking: Reported on 7/23/2025   Omega-3 Fatty Acids (Omega-3 Fish Oil) 1000 MG CAPS Not Taking Self Yes No   Sig: Take 2 g by mouth in the morning.   Patient not taking: Reported on 7/23/2025    Zinc 50 MG TABS Past Week Self Yes Yes   Sig: Take 50 mg by mouth in the morning.   acetaminophen (TYLENOL) 650 mg CR tablet 2025 Noon Self Yes Yes   Sig: Take 1,300 mg by mouth every 8 (eight) hours as needed   apixaban (ELIQUIS) 5 mg  Self No No   Sig: Take 1 tablet (5 mg total) by mouth 2 (two) times a day   fluorouracil (EFUDEX) 5 % cream  Self No No   Sig: Apply topically 2 (two) times a day For 5 days to scalp and upper forehead   Patient not taking: Reported on 3/3/2025   fluticasone (FLONASE) 50 mcg/act nasal spray  Self No No   Si sprays into each nostril daily   fluticasone (FLONASE) 50 mcg/act nasal spray  Self No No   Si sprays into each nostril 2 (two) times a day   Patient not taking: Reported on 3/3/2025   meloxicam (MOBIC) 15 mg tablet Not Taking Self Yes No   Sig: Take 15 mg by mouth in the morning.   Patient not taking: Reported on 2025   metoprolol tartrate (LOPRESSOR) 25 mg tablet 2025 Morning  Yes Yes   Sig: Take 25 mg by mouth in the morning   simvastatin (ZOCOR) 20 mg tablet 2025 Bedtime Self No Yes   Sig: TAKE 1 TABLET BY MOUTH DAILY AT  BEDTIME   sotalol (BETAPACE) 80 mg tablet Not Taking Self No No   Sig: TAKE 1 AND 1/2 TABLETS BY MOUTH  TWICE DAILY   Patient not taking: Reported on 2025   tamsulosin (FLOMAX) 0.4 mg 2025 Bedtime Self Yes Yes   Sig: Take 0.8 mg by mouth daily with dinner      Facility-Administered Medications: None     Patient's Medications   Discharge Prescriptions    No medications on file     No discharge procedures on file.  ED SEPSIS DOCUMENTATION   Time reflects when diagnosis was documented in both MDM as applicable and the Disposition within this note       Time User Action Codes Description Comment    2025 11:00 PM Mary Owens Add [R07.9] Chest pain     2025 11:01 PM Mary Owens Add [R79.89] Elevated troponin                      [1]   Past Medical History:  Diagnosis Date    Acne     Arthritis     Atrial  fibrillation (HCC)     Benign hypertension     Blood coagulation disorder (HCC)     Chronic bilateral low back pain without sciatica 04/24/2018    Clotting disorder (HCC)     Colon polyp     Hernia 11/1/2021    bilateral inguinal hernia repair 12/1/21    Hip arthritis 01/22/2019    Hypertriglyceridemia     Lipid metabolism disorder     Nasal congestion     Neurogenic bladder     Other postprocedural cardiac functional disturbances following cardiac surgery     Preop examination 01/22/2019    Scoliosis     Skin tag     Squamous cell skin cancer     scalp    Urinary retention 2018    Urinary tract infection 2018    Varicella    [2]   Past Surgical History:  Procedure Laterality Date    ADENOIDECTOMY      COLONOSCOPY      CYSTOSCOPY  2019    HERNIA REPAIR      HIP SURGERY Right 02/2019    artificial hip    KNEE SURGERY Bilateral     LIPOMA RESECTION      MOHS SURGERY  2012    TONSILLECTOMY     [3]   Family History  Problem Relation Name Age of Onset    Diabetes Mother      Colon cancer Mother      Lung cancer Father Harish Mae     Alcohol abuse Father Harish Mae     Suicide Attempts Brother Harish Mae         Suicide 2001    Suicide Attempts Brother Harish Mae         Suicide 2001   [4]   Social History  Tobacco Use    Smoking status: Former    Smokeless tobacco: Never    Tobacco comments:     quite in 1972   Vaping Use    Vaping status: Never Used   Substance Use Topics    Alcohol use: Yes     Alcohol/week: 1.0 standard drink of alcohol     Types: 1 Glasses of wine per week     Comment: each night    Drug use: No        Mary Owens MD  07/23/25 5015

## 2025-07-24 NOTE — ASSESSMENT & PLAN NOTE
EKG shows rate controlled atrial flutter.  Recent event monitor at [facility] reviewed with atrial flutter as well.  Continue Lopressor.  [id number] at least 3.  Eliquis held upon admission, continue heparin gtt.  May be reasonable to consider repeat ablation.  Follows with EP at [facility].

## 2025-07-24 NOTE — ASSESSMENT & PLAN NOTE
Elevated trop  Heparin drip   Planning for cardiac cath by cardiology today  N.p.o.  Continue beta-blocker  Telemetry monitoring

## 2025-07-25 ENCOUNTER — TRANSITIONAL CARE MANAGEMENT (OUTPATIENT)
Dept: FAMILY MEDICINE CLINIC | Facility: CLINIC | Age: 75
End: 2025-07-25

## 2025-07-25 VITALS
SYSTOLIC BLOOD PRESSURE: 119 MMHG | HEART RATE: 91 BPM | HEIGHT: 75 IN | BODY MASS INDEX: 20.76 KG/M2 | TEMPERATURE: 98 F | DIASTOLIC BLOOD PRESSURE: 83 MMHG | OXYGEN SATURATION: 96 % | WEIGHT: 167 LBS | RESPIRATION RATE: 18 BRPM

## 2025-07-25 LAB
ANION GAP SERPL CALCULATED.3IONS-SCNC: 8 MMOL/L (ref 4–13)
BUN SERPL-MCNC: 17 MG/DL (ref 5–25)
CALCIUM SERPL-MCNC: 9.9 MG/DL (ref 8.4–10.2)
CHLORIDE SERPL-SCNC: 106 MMOL/L (ref 96–108)
CO2 SERPL-SCNC: 25 MMOL/L (ref 21–32)
CREAT SERPL-MCNC: 0.87 MG/DL (ref 0.6–1.3)
GFR SERPL CREATININE-BSD FRML MDRD: 85 ML/MIN/1.73SQ M
GLUCOSE SERPL-MCNC: 89 MG/DL (ref 65–140)
POTASSIUM SERPL-SCNC: 4.1 MMOL/L (ref 3.5–5.3)
SODIUM SERPL-SCNC: 139 MMOL/L (ref 135–147)

## 2025-07-25 PROCEDURE — 80048 BASIC METABOLIC PNL TOTAL CA: CPT

## 2025-07-25 PROCEDURE — 99239 HOSP IP/OBS DSCHRG MGMT >30: CPT | Performed by: INTERNAL MEDICINE

## 2025-07-25 RX ADMIN — METOPROLOL TARTRATE 25 MG: 25 TABLET, FILM COATED ORAL at 08:00

## 2025-07-25 NOTE — ASSESSMENT & PLAN NOTE
Note of elevated troponin  Underwent echo and cardiac cath without significant abnormalities  Chest pain is since resolved  Medically cleared for discharge

## 2025-07-25 NOTE — DISCHARGE SUMMARY
Discharge Summary - Hospitalist   Name: Benjy Mae 75 y.o. male I MRN: [id number]  Unit/Bed#: 2 E 266-01 I Date of Admission: 7/23/2025   Date of Service: 7/25/2025 I Hospital Day: 1     Assessment & Plan  Chest pain  Note of elevated troponin  Underwent echo and cardiac cath without significant abnormalities  Chest pain is since resolved  Medically cleared for discharge  Essential hypertension  Continue with Lopressor   Persistent atrial flutter/fibrillation; H/O ablation (5/2020) and multiple DCCV  Continue Lopressor and currently heparin drip  Takes Eliquis at home  Mixed hyperlipidemia  Continues statin  Elevated troponin  Plan as outlined above  Coronary artery calcification seen on CAT scan  Noted       Discharging Physician / Practitioner: Robin Sommers MD  PCP: Ricardo Torres MD  Admission Date:   Admission Orders (From admission, onward)       Ordered        07/24/25 1210  INPATIENT ADMISSION  Once            07/23/25 2305  Place in Observation  Once                          Discharge Date: 07/25/25    Medical Problems       Resolved Problems  Date Reviewed: 7/25/2025   None         Consultations During Hospital Stay:  Cardiology    Procedures Performed:   None ]    Significant Findings / Test Results:   XR chest 1 view portable  Result Date: 7/24/2025  Impression: No acute cardiopulmonary disease. Resident: Isidoro Blair I, the attending radiologist, have reviewed the images and agree with the final report above. Workstation performed: [id number]     CTA chest pe study  Result Date: 7/23/2025  Impression: No pulmonary embolus identified. No airspace consolidation, pneumothorax or pleural effusion. Computerized Assisted Algorithm (CAA) may have aided analysis of applicable images. Workstation performed: [id number] digitally given    Incidental Findings:   none     Test Results Pending at Discharge (will require follow up):   none     Outpatient Tests Requested:  none    Complications:   "none    Reason for Admission: Chest pain    Hospital Course:     Benjy Mae is a 75 y.o. male patient who originally presented to the hospital on 7/23/2025 with past medical history of atrial fibrillation, hypertension who initially presented chest pain and elevated troponin.  Underwent cardiac cath without any significant abnormalities.  Chest pain resolved.  Medically cleared for discharge will follow-up outpatient with primary care doctor      Please see above list of diagnoses and related plan for additional information.     Condition at Discharge: stable     Discharge Day Visit / Exam:     Subjective:   Denies chest pain, shortness of breath, cough, fevers, chills  Vitals: Blood Pressure: 129/86 (07/25/25 0800)  Pulse: 91 (07/25/25 0800)  Temperature: 98 °F (36.7 °C) (07/25/25 0726)  Temp Source: Oral (07/24/25 1900)  Respirations: 18 (07/24/25 1533)  Height: 6' 3\" (190.5 cm) (07/24/25 1017)  Weight - Scale: 75.8 kg (167 lb) (07/24/25 1017)  SpO2: 96 % (07/25/25 0249)  Exam:   Physical Exam  Constitutional:       General: He is not in acute distress.     Appearance: He is well-developed. He is not diaphoretic.   HENT:      Head: Normocephalic and atraumatic.      Nose: Nose normal.      Mouth/Throat:      Pharynx: No oropharyngeal exudate.     Eyes:      General: No scleral icterus.     Conjunctiva/sclera: Conjunctivae normal.       Cardiovascular:      Rate and Rhythm: Normal rate and regular rhythm.      Heart sounds: Normal heart sounds. No murmur heard.     No friction rub. No gallop.   Pulmonary:      Effort: Pulmonary effort is normal. No respiratory distress.      Breath sounds: Normal breath sounds. No wheezing or rales.   Chest:      Chest wall: No tenderness.   Abdominal:      General: Bowel sounds are normal. There is no distension.      Palpations: Abdomen is soft.      Tenderness: There is no abdominal tenderness. There is no guarding.     Musculoskeletal:         General: No tenderness or " deformity. Normal range of motion.      Cervical back: Normal range of motion and neck supple.     Skin:     General: Skin is warm and dry.      Findings: No erythema.     Neurological:      Mental Status: He is alert. Mental status is at baseline.       (  Discussion with Family: pt, declined family update     Discharge instructions/Information to patient and family:   See after visit summary for information provided to patient and family.      Provisions for Follow-Up Care:  See after visit summary for information related to follow-up care and any pertinent home health orders.      Disposition:     Home    For Discharges to [facility]F:   Not Applicable to this Patient - Not Applicable to this Patient    Planned Readmission: none     Discharge Statement:  I spent 60 minutes discharging the patient. This time was spent on the day of discharge. I had direct contact with the patient on the day of discharge. Greater than 50% of the total time was spent examining patient, answering all patient questions, arranging and discussing plan of care with patient as well as directly providing post-discharge instructions.  Additional time then spent on discharge activities.    Discharge Medications:  See after visit summary for reconciled discharge medications provided to patient and family.      ** Please Note: This note has been constructed using a voice recognition system **

## 2025-07-25 NOTE — CASE MANAGEMENT
Case Management Assessment & Discharge Planning Note    Patient name Benjy Mae  Location 2 [facility] 266/2 E 266-01 MRN [id number]  : 1950 Date 2025       Current Admission Date: 2025  Current Admission Diagnosis:Chest pain   Patient Active Problem List    Diagnosis Date Noted    Elevated troponin 2025    Coronary artery calcification seen on CAT scan 2025    Chest pain 2025    Thoracic aortic aneurysm without rupture (HCC) 10/15/2021    History of skin cancer 10/15/2021    Lung nodule < 6cm on CT 10/15/2021    History of tobacco use 10/15/2021    Family history of colon cancer in mother 10/15/2021    Current use of long term anticoagulation 2019    History of total hip arthroplasty 2019    Neurogenic bladder 2019    Persistent atrial flutter/fibrillation; H/O ablation (2020) and multiple DCCV 2018    Cardiomyopathy (HCC) 2014    Mixed hyperlipidemia 2014    Essential hypertension 2014      LOS (days): 1  Geometric Mean LOS (GMLOS) (days): 1.8  Days to GMLOS:0.9     OBJECTIVE:    Risk of Unplanned Readmission Score: 8.57         Current admission status: Inpatient       Preferred Pharmacy:   [facility] - [address] [address]  Phone: [phone] Fax: [phone]    [id number] [place], [place] - [address] [facility]  [address] [place] [id number]  Phone: [phone] Fax: [phone]    [facility] - [place], [place] - [address]  [address]  [address] [address]  Phone: [phone] Fax: [phone]    Primary Care Provider: Ricardo Torres MD    Primary Insurance: MEDICARE  Secondary Insurance: AARP    ASSESSMENT:  Active Health Care Proxies    There are no active Health Care Proxies on file.       Advance Directives  Does patient have a Health Care POA?: Yes  Does  patient have Advance Directives?: Yes  Advance Directives: Living will, Power of  for health care  Primary Contact: Angela Parham (Spouse)   [phone] (H)   [phone] (M)         Readmission Root Cause  30 Day Readmission: No    Patient Information  Admitted from:: Home  Mental Status: Alert  During Assessment patient was accompanied by: Not accompanied during assessment  Assessment information provided by:: Patient  Primary Caregiver: Self  Support Systems: Self, Spouse/significant other  County of Residence: [place]  What city do you live in?: [place]  Home entry access options. Select all that apply.:  (Patient reports no issues/concerns with accessing home environment.  [facility] 24.)  Living Arrangements: Lives w/ Spouse/significant other  Is patient a ?: No    Activities of Daily Living Prior to Admission  Functional Status: Independent  Completes ADLs independently?: Yes  Ambulates independently?: Yes  Does patient use assisted devices?: No  Does patient currently own DME?: No  Does the patient have a history of Short-Term Rehab?: No  Does patient have a history of HHC?: No  Does patient currently have HHC?: No    Patient Information Continued  Does patient have prescription coverage?: Yes  Can the patient afford their medications and any related supplies (such as glucometers or test strips)?: Yes  Does patient receive dialysis treatments?: No  Does patient have a history of substance abuse?: No  Does patient have a history of Mental Health Diagnosis?: No    Means of Transportation  Means of Transport to Appts:: Drives Self          DISCHARGE DETAILS:    Discharge planning discussed with:: Patient at bedside.  Freedom of Choice: Yes  Comments - Freedom of Choice: FOC maintained - CM introduced self and role.  Reviewed DCP, patient for d/c home today with no anticipated CM needs.  Patient endorses same.  Aware CM will remain available through to d/c.  CM contacted family/caregiver?: No-  see comments (independent)  Were Treatment Team discharge recommendations reviewed with patient/caregiver?: Yes  Did patient/caregiver verbalize understanding of patient care needs?: Yes  Were patient/caregiver advised of the risks associated with not following Treatment Team discharge recommendations?: Yes    Requested Home Health Care         Is the patient interested in HHC at discharge?: No    DME Referral Provided  Referral made for DME?: No    Other Referral/Resources/Interventions Provided:  Referral Comments: None indicated.    Treatment Team Recommendation: Home  Expected Discharge Disposition: Home or Self Care     Transport at Discharge : Family    IMM Given (Date):: 07/25/25  IMM Given to:: Patient (Verbal review with patient at bedside.  Understanding verbalized, no questions or concerns.  In agreement with plan for d/c home today.  Original to medical records.)

## 2025-07-25 NOTE — PLAN OF CARE
Problem: PAIN - ADULT  Goal: Verbalizes/displays adequate comfort level or baseline comfort level  Description: Interventions:  - Encourage patient to monitor pain and request assistance  - Assess pain using appropriate pain scale  - Administer analgesics as ordered based on type and severity of pain and evaluate response  - Implement non-pharmacological measures as appropriate and evaluate response  - Consider cultural and social influences on pain and pain management  - Notify physician/advanced practitioner if interventions unsuccessful or patient reports new pain  - Educate patient/family on pain management process including their role and importance of  reporting pain   - Provide non-pharmacologic/complimentary pain relief interventions  Outcome: Progressing     Problem: INFECTION - ADULT  Goal: Absence or prevention of progression during hospitalization  Description: INTERVENTIONS:  - Assess and monitor for signs and symptoms of infection  - Monitor lab/diagnostic results  - Monitor all insertion sites, i.e. indwelling lines, tubes, and drains  - Monitor endotracheal if appropriate and nasal secretions for changes in amount and color  - [place] appropriate cooling/warming therapies per order  - Administer medications as ordered  - Instruct and encourage patient and family to use good hand hygiene technique  - Identify and instruct in appropriate isolation precautions for identified infection/condition  Outcome: Progressing  Goal: Absence of fever/infection during neutropenic period  Description: INTERVENTIONS:  - Monitor WBC  - Perform strict hand hygiene  - Limit to healthy visitors only  - No plants, dried, fresh or silk flowers with aceves in patient room  Outcome: Progressing     Problem: SAFETY ADULT  Goal: Patient will remain free of falls  Description: INTERVENTIONS:  - Educate patient/family on patient safety including physical limitations  - Instruct patient to call for assistance with activity   -  Consider consulting OT/PT to assist with strengthening/mobility based on AM PAC & JH-HLM score  - Consult OT/PT to assist with strengthening/mobility   - Keep Call bell within reach  - Keep bed low and locked with side rails adjusted as appropriate  - Keep care items and personal belongings within reach  - Initiate and maintain comfort rounds  - Make Fall Risk Sign visible to staff  - Offer Toileting every 2 Hours, in advance of need  - Initiate/Maintain alarm  - Obtain necessary fall risk management equipment  - Apply yellow socks and bracelet for high fall risk patients  - Consider moving patient to room near nurses station  Outcome: Progressing  Goal: Maintain or return to baseline ADL function  Description: INTERVENTIONS:  -  Assess patient's ability to carry out ADLs; assess patient's baseline for ADL function and identify physical deficits which impact ability to perform ADLs (bathing, care of mouth/teeth, toileting, grooming, dressing, etc.)  - Assess/evaluate cause of self-care deficits   - Assess range of motion  - Assess patient's mobility; develop plan if impaired  - Assess patient's need for assistive devices and provide as appropriate  - Encourage maximum independence but intervene and supervise when necessary  - Involve family in performance of ADLs  - Assess for home care needs following discharge   - Consider OT consult to assist with ADL evaluation and planning for discharge  - Provide patient education as appropriate  - Monitor functional capacity and physical performance, use of AM PAC & JH-HLM   - Monitor gait, balance and fatigue with ambulation    Outcome: Progressing  Goal: Maintains/Returns to pre admission functional level  Description: INTERVENTIONS:  - Perform AM-PAC 6 Click Basic Mobility/ Daily Activity assessment daily.  - Set and communicate daily mobility goal to care team and patient/family/caregiver.   - Collaborate with rehabilitation services on mobility goals if consulted  -  Perform Range of Motion 3 times a day.  - Reposition patient every 3 hours.  - Dangle patient 3 times a day  - Stand patient 3 times a day  - Ambulate patient 3 times a day  - Out of bed to chair 3 times a day   - Out of bed for meals 3 times a day  - Out of bed for toileting  - Record patient progress and toleration of activity level   Outcome: Progressing     Problem: DISCHARGE PLANNING  Goal: Discharge to home or other facility with appropriate resources  Description: INTERVENTIONS:  - Identify barriers to discharge w/patient and caregiver  - Arrange for needed discharge resources and transportation as appropriate  - Identify discharge learning needs (meds, wound care, etc.)  - Arrange for interpretive services to assist at discharge as needed  - Refer to Case Management Department for coordinating discharge planning if the patient needs post-hospital services based on physician/advanced practitioner order or complex needs related to functional status, cognitive ability, or social support system  Outcome: Progressing     Problem: Knowledge Deficit  Goal: Patient/family/caregiver demonstrates understanding of disease process, treatment plan, medications, and discharge instructions  Description: Complete learning assessment and assess knowledge base.  Interventions:  - Provide teaching at level of understanding  - Provide teaching via preferred learning methods  Outcome: Progressing

## 2025-07-25 NOTE — PLAN OF CARE
Problem: PAIN - ADULT  Goal: Verbalizes/displays adequate comfort level or baseline comfort level  Description: Interventions:  - Encourage patient to monitor pain and request assistance  - Assess pain using appropriate pain scale  - Administer analgesics as ordered based on type and severity of pain and evaluate response  - Implement non-pharmacological measures as appropriate and evaluate response  - Consider cultural and social influences on pain and pain management  - Notify physician/advanced practitioner if interventions unsuccessful or patient reports new pain  - Educate patient/family on pain management process including their role and importance of  reporting pain   - Provide non-pharmacologic/complimentary pain relief interventions  Outcome: Adequate for Discharge     Problem: INFECTION - ADULT  Goal: Absence or prevention of progression during hospitalization  Description: INTERVENTIONS:  - Assess and monitor for signs and symptoms of infection  - Monitor lab/diagnostic results  - Monitor all insertion sites, i.e. indwelling lines, tubes, and drains  - Monitor endotracheal if appropriate and nasal secretions for changes in amount and color  - [place] appropriate cooling/warming therapies per order  - Administer medications as ordered  - Instruct and encourage patient and family to use good hand hygiene technique  - Identify and instruct in appropriate isolation precautions for identified infection/condition  Outcome: Adequate for Discharge  Goal: Absence of fever/infection during neutropenic period  Description: INTERVENTIONS:  - Monitor WBC  - Perform strict hand hygiene  - Limit to healthy visitors only  - No plants, dried, fresh or silk flowers with aceves in patient room  Outcome: Adequate for Discharge     Problem: SAFETY ADULT  Goal: Patient will remain free of falls  Description: INTERVENTIONS:  - Educate patient/family on patient safety including physical limitations  - Instruct patient to call  for assistance with activity   - Consider consulting OT/PT to assist with strengthening/mobility based on AM PAC & JH-HLM score  - Consult OT/PT to assist with strengthening/mobility   - Keep Call bell within reach  - Keep bed low and locked with side rails adjusted as appropriate  - Keep care items and personal belongings within reach  - Initiate and maintain comfort rounds  - Make Fall Risk Sign visible to staff  - Offer Toileting every  Hours, in advance of need  - Initiate/Maintain alarm  - Obtain necessary fall risk management equipment:   - Apply yellow socks and bracelet for high fall risk patients  - Consider moving patient to room near nurses station  Outcome: Adequate for Discharge  Goal: Maintain or return to baseline ADL function  Description: INTERVENTIONS:  -  Assess patient's ability to carry out ADLs; assess patient's baseline for ADL function and identify physical deficits which impact ability to perform ADLs (bathing, care of mouth/teeth, toileting, grooming, dressing, etc.)  - Assess/evaluate cause of self-care deficits   - Assess range of motion  - Assess patient's mobility; develop plan if impaired  - Assess patient's need for assistive devices and provide as appropriate  - Encourage maximum independence but intervene and supervise when necessary  - Involve family in performance of ADLs  - Assess for home care needs following discharge   - Consider OT consult to assist with ADL evaluation and planning for discharge  - Provide patient education as appropriate  - Monitor functional capacity and physical performance, use of AM PAC & JH-HLM   - Monitor gait, balance and fatigue with ambulation    Outcome: Adequate for Discharge  Goal: Maintains/Returns to pre admission functional level  Description: INTERVENTIONS:  - Perform AM-PAC 6 Click Basic Mobility/ Daily Activity assessment daily.  - Set and communicate daily mobility goal to care team and patient/family/caregiver.   - Collaborate with  rehabilitation services on mobility goals if consulted  - Perform Range of Motion  times a day.  - Reposition patient every  hours.  - Dangle patient  times aday  - Stand patient  times a day  - Ambulate patient  times a day  - Out of bed to chair  times a day   - Out of bed for meals   Problem: Knowledge Deficit  Goal: Patient/family/caregiver demonstrates understanding of disease process, treatment plan, medications, and discharge instructions  Description: Complete learning assessment and assess knowledge base.  Interventions:  - Provide teaching at level of understanding  - Provide teaching via preferred learning methods  Outcome: Adequate for Discharge     Problem: DISCHARGE PLANNING  Goal: Discharge to home or other facility with appropriate resources  Description: INTERVENTIONS:  - Identify barriers to discharge w/patient and caregiver  - Arrange for needed discharge resources and transportation as appropriate  - Identify discharge learning needs (meds, wound care, etc.)  - Arrange for interpretive services to assist at discharge as needed  - Refer to Case Management Department for coordinating discharge planning if the patient needs post-hospital services based on physician/advanced practitioner order or complex needs related to functional status, cognitive ability, or social support system  Outcome: Adequate for Discharge    times a day  - Out of bed for toileting  - Record patient progress and toleration of activity level   Outcome: Adequate for Discharge

## 2025-07-31 ENCOUNTER — OFFICE VISIT (OUTPATIENT)
Dept: FAMILY MEDICINE CLINIC | Facility: CLINIC | Age: 75
End: 2025-07-31
Payer: MEDICARE

## 2025-07-31 VITALS
TEMPERATURE: 96.2 F | BODY MASS INDEX: 21.49 KG/M2 | HEIGHT: 75 IN | WEIGHT: 172.8 LBS | HEART RATE: 78 BPM | SYSTOLIC BLOOD PRESSURE: 90 MMHG | DIASTOLIC BLOOD PRESSURE: 70 MMHG | RESPIRATION RATE: 12 BRPM | OXYGEN SATURATION: 97 %

## 2025-07-31 DIAGNOSIS — I48.19 PERSISTENT ATRIAL FIBRILLATION (HCC): ICD-10-CM

## 2025-07-31 DIAGNOSIS — Z09 HOSPITAL DISCHARGE FOLLOW-UP: ICD-10-CM

## 2025-07-31 DIAGNOSIS — I42.9 CARDIOMYOPATHY, UNSPECIFIED TYPE (HCC): Primary | ICD-10-CM

## 2025-07-31 DIAGNOSIS — I10 ESSENTIAL HYPERTENSION: ICD-10-CM

## 2025-07-31 DIAGNOSIS — R79.89 ELEVATED TROPONIN: ICD-10-CM

## 2025-07-31 PROCEDURE — 99496 TRANSJ CARE MGMT HIGH F2F 7D: CPT | Performed by: STUDENT IN AN ORGANIZED HEALTH CARE EDUCATION/TRAINING PROGRAM

## 2025-07-31 RX ORDER — METOPROLOL SUCCINATE 25 MG/1
TABLET, EXTENDED RELEASE ORAL
COMMUNITY
Start: 2025-06-25

## (undated) DEVICE — DGW .035 FC J3MM 260CM TEF: Brand: EMERALD

## (undated) DEVICE — RADIFOCUS OPTITORQUE ANGIOGRAPHIC CATHETER: Brand: OPTITORQUE

## (undated) DEVICE — GLIDESHEATH SLENDER STAINLESS STEEL KIT: Brand: GLIDESHEATH SLENDER